# Patient Record
Sex: MALE | Race: BLACK OR AFRICAN AMERICAN | NOT HISPANIC OR LATINO | URBAN - METROPOLITAN AREA
[De-identification: names, ages, dates, MRNs, and addresses within clinical notes are randomized per-mention and may not be internally consistent; named-entity substitution may affect disease eponyms.]

---

## 2017-05-08 ENCOUNTER — EMERGENCY (EMERGENCY)
Facility: HOSPITAL | Age: 82
LOS: 1 days | Discharge: HOME | End: 2017-05-08
Attending: EMERGENCY MEDICINE | Admitting: EMERGENCY MEDICINE
Payer: MEDICARE

## 2017-05-08 VITALS
OXYGEN SATURATION: 100 % | TEMPERATURE: 98 F | SYSTOLIC BLOOD PRESSURE: 148 MMHG | DIASTOLIC BLOOD PRESSURE: 119 MMHG | RESPIRATION RATE: 18 BRPM | HEART RATE: 72 BPM

## 2017-05-08 VITALS
HEART RATE: 72 BPM | DIASTOLIC BLOOD PRESSURE: 72 MMHG | RESPIRATION RATE: 16 BRPM | SYSTOLIC BLOOD PRESSURE: 118 MMHG | OXYGEN SATURATION: 100 %

## 2017-05-08 LAB
APPEARANCE UR: SIGNIFICANT CHANGE UP
BACTERIA # UR AUTO: HIGH
BASE EXCESS BLDV CALC-SCNC: -2.1 MMOL/L — SIGNIFICANT CHANGE UP
BASOPHILS # BLD AUTO: 0.02 K/UL — SIGNIFICANT CHANGE UP (ref 0–0.2)
BASOPHILS NFR BLD AUTO: 0.3 % — SIGNIFICANT CHANGE UP (ref 0–2)
BILIRUB UR-MCNC: NEGATIVE — SIGNIFICANT CHANGE UP
BLOOD GAS VENOUS - CREATININE: 2.02 MG/DL — HIGH (ref 0.5–1.3)
BLOOD UR QL VISUAL: HIGH
BUN SERPL-MCNC: 82 MG/DL — HIGH (ref 7–23)
CALCIUM SERPL-MCNC: 9.5 MG/DL — SIGNIFICANT CHANGE UP (ref 8.4–10.5)
CHLORIDE BLDV-SCNC: 116 MMOL/L — HIGH (ref 96–108)
CHLORIDE SERPL-SCNC: 114 MMOL/L — HIGH (ref 98–107)
CO2 SERPL-SCNC: 19 MMOL/L — LOW (ref 22–31)
COLOR SPEC: YELLOW — SIGNIFICANT CHANGE UP
CREAT SERPL-MCNC: 2.08 MG/DL — HIGH (ref 0.5–1.3)
EOSINOPHIL # BLD AUTO: 0.15 K/UL — SIGNIFICANT CHANGE UP (ref 0–0.5)
EOSINOPHIL NFR BLD AUTO: 2.6 % — SIGNIFICANT CHANGE UP (ref 0–6)
GAS PNL BLDV: 147 MMOL/L — HIGH (ref 136–146)
GLUCOSE BLDV-MCNC: 92 — SIGNIFICANT CHANGE UP (ref 70–99)
GLUCOSE SERPL-MCNC: 91 MG/DL — SIGNIFICANT CHANGE UP (ref 70–99)
GLUCOSE UR-MCNC: NEGATIVE — SIGNIFICANT CHANGE UP
HCO3 BLDV-SCNC: 23 MMOL/L — SIGNIFICANT CHANGE UP (ref 20–27)
HCT VFR BLD CALC: 35.9 % — LOW (ref 39–50)
HCT VFR BLDV CALC: 35 % — LOW (ref 39–51)
HGB BLD-MCNC: 11.4 G/DL — LOW (ref 13–17)
HGB BLDV-MCNC: 11.4 G/DL — LOW (ref 13–17)
IMM GRANULOCYTES NFR BLD AUTO: 0 % — SIGNIFICANT CHANGE UP (ref 0–1.5)
KETONES UR-MCNC: NEGATIVE — SIGNIFICANT CHANGE UP
LACTATE BLDV-MCNC: 1 MMOL/L — SIGNIFICANT CHANGE UP (ref 0.5–2)
LEUKOCYTE ESTERASE UR-ACNC: HIGH
LYMPHOCYTES # BLD AUTO: 1.69 K/UL — SIGNIFICANT CHANGE UP (ref 1–3.3)
LYMPHOCYTES # BLD AUTO: 29.3 % — SIGNIFICANT CHANGE UP (ref 13–44)
MCHC RBC-ENTMCNC: 31.5 PG — SIGNIFICANT CHANGE UP (ref 27–34)
MCHC RBC-ENTMCNC: 31.8 % — LOW (ref 32–36)
MCV RBC AUTO: 99.2 FL — SIGNIFICANT CHANGE UP (ref 80–100)
MONOCYTES # BLD AUTO: 0.81 K/UL — SIGNIFICANT CHANGE UP (ref 0–0.9)
MONOCYTES NFR BLD AUTO: 14.1 % — HIGH (ref 2–14)
NEUTROPHILS # BLD AUTO: 3.09 K/UL — SIGNIFICANT CHANGE UP (ref 1.8–7.4)
NEUTROPHILS NFR BLD AUTO: 53.7 % — SIGNIFICANT CHANGE UP (ref 43–77)
NITRITE UR-MCNC: NEGATIVE — SIGNIFICANT CHANGE UP
PCO2 BLDV: 36 MMHG — LOW (ref 41–51)
PH BLDV: 7.4 PH — SIGNIFICANT CHANGE UP (ref 7.32–7.43)
PH UR: 8 — SIGNIFICANT CHANGE UP (ref 4.6–8)
PLATELET # BLD AUTO: 184 K/UL — SIGNIFICANT CHANGE UP (ref 150–400)
PMV BLD: 10.3 FL — SIGNIFICANT CHANGE UP (ref 7–13)
PO2 BLDV: 45 MMHG — HIGH (ref 35–40)
POTASSIUM BLDV-SCNC: 4.5 MMOL/L — SIGNIFICANT CHANGE UP (ref 3.4–4.5)
POTASSIUM SERPL-MCNC: 4.8 MMOL/L — SIGNIFICANT CHANGE UP (ref 3.5–5.3)
POTASSIUM SERPL-SCNC: 4.8 MMOL/L — SIGNIFICANT CHANGE UP (ref 3.5–5.3)
PROT UR-MCNC: 150 — HIGH
RBC # BLD: 3.62 M/UL — LOW (ref 4.2–5.8)
RBC # FLD: 13.5 % — SIGNIFICANT CHANGE UP (ref 10.3–14.5)
RBC CASTS # UR COMP ASSIST: >50 — HIGH (ref 0–?)
SAO2 % BLDV: 79.3 % — SIGNIFICANT CHANGE UP (ref 60–85)
SODIUM SERPL-SCNC: 147 MMOL/L — HIGH (ref 135–145)
SP GR SPEC: 1.02 — SIGNIFICANT CHANGE UP (ref 1–1.03)
UROBILINOGEN FLD QL: NORMAL E.U. — SIGNIFICANT CHANGE UP (ref 0.1–0.2)
WBC # BLD: 5.76 K/UL — SIGNIFICANT CHANGE UP (ref 3.8–10.5)
WBC # FLD AUTO: 5.76 K/UL — SIGNIFICANT CHANGE UP (ref 3.8–10.5)
WBC CLUMPS #/AREA URNS HPF: PRESENT — HIGH (ref 0–?)
WBC UR QL: >50 — HIGH (ref 0–?)

## 2017-05-08 PROCEDURE — 99284 EMERGENCY DEPT VISIT MOD MDM: CPT | Mod: 25

## 2017-05-08 PROCEDURE — 71010: CPT | Mod: 26

## 2017-05-08 RX ORDER — SODIUM CHLORIDE 9 MG/ML
1000 INJECTION INTRAMUSCULAR; INTRAVENOUS; SUBCUTANEOUS ONCE
Qty: 0 | Refills: 0 | Status: COMPLETED | OUTPATIENT
Start: 2017-05-08 | End: 2017-05-08

## 2017-05-08 RX ORDER — CEFUROXIME AXETIL 250 MG
1 TABLET ORAL
Qty: 20 | Refills: 0 | OUTPATIENT
Start: 2017-05-08 | End: 2017-05-18

## 2017-05-08 RX ORDER — ACETAMINOPHEN 500 MG
650 TABLET ORAL ONCE
Qty: 0 | Refills: 0 | Status: COMPLETED | OUTPATIENT
Start: 2017-05-08 | End: 2017-05-08

## 2017-05-08 RX ORDER — SODIUM CHLORIDE 9 MG/ML
3 INJECTION INTRAMUSCULAR; INTRAVENOUS; SUBCUTANEOUS ONCE
Qty: 0 | Refills: 0 | Status: COMPLETED | OUTPATIENT
Start: 2017-05-08 | End: 2017-05-08

## 2017-05-08 RX ORDER — CEFTRIAXONE 500 MG/1
1 INJECTION, POWDER, FOR SOLUTION INTRAMUSCULAR; INTRAVENOUS ONCE
Qty: 0 | Refills: 0 | Status: COMPLETED | OUTPATIENT
Start: 2017-05-08 | End: 2017-05-08

## 2017-05-08 RX ADMIN — Medication 650 MILLIGRAM(S): at 03:53

## 2017-05-08 RX ADMIN — CEFTRIAXONE 100 GRAM(S): 500 INJECTION, POWDER, FOR SOLUTION INTRAMUSCULAR; INTRAVENOUS at 03:53

## 2017-05-08 RX ADMIN — SODIUM CHLORIDE 3 MILLILITER(S): 9 INJECTION INTRAMUSCULAR; INTRAVENOUS; SUBCUTANEOUS at 02:10

## 2017-05-08 RX ADMIN — Medication 125 MILLIGRAM(S): at 02:10

## 2017-05-08 RX ADMIN — SODIUM CHLORIDE 1000 MILLILITER(S): 9 INJECTION INTRAMUSCULAR; INTRAVENOUS; SUBCUTANEOUS at 03:58

## 2017-05-08 NOTE — ED PROVIDER NOTE - MEDICAL DECISION MAKING DETAILS
pt with change in MS as well as cough.  Will look for infectious source.  Discussed options with regards to patient disposition and will attempt home care as patient does not do well in hospitals.  elevated BUN/Cr ratio so hydrated in ED.

## 2017-05-08 NOTE — ED ADULT TRIAGE NOTE - CHIEF COMPLAINT QUOTE
Pt arrives from home with aide, reporting cough and wheezing x2 days. Per aide pt has been given albuterol tx's Q3h with no relief. Pt appears in no acute distress, vs as noted. Hx asthma, dementia and HTN

## 2017-05-08 NOTE — ED ADULT NURSE NOTE - OBJECTIVE STATEMENT
pt presents to room 4 awake, alert, disoriented with hx of alzheimers, caretakers at bedside. as per caretakers, pt had a cough since yesterday and became "weak and lethargic" today. caretakers deny fever, sweats, vomitting or diarrhea. no sick contacts, no flu shot this year. nad noted. md assessed. labs drawn and sent. will continue to monitor

## 2017-05-09 LAB — SPECIMEN SOURCE: SIGNIFICANT CHANGE UP

## 2017-05-10 LAB
-  AMIKACIN: SIGNIFICANT CHANGE UP
-  AMPICILLIN/SULBACTAM: SIGNIFICANT CHANGE UP
-  AMPICILLIN: SIGNIFICANT CHANGE UP
-  AZTREONAM: SIGNIFICANT CHANGE UP
-  CEFAZOLIN: SIGNIFICANT CHANGE UP
-  CEFEPIME: SIGNIFICANT CHANGE UP
-  CEFOXITIN: SIGNIFICANT CHANGE UP
-  CEFTAZIDIME: SIGNIFICANT CHANGE UP
-  CEFTRIAXONE: SIGNIFICANT CHANGE UP
-  CIPROFLOXACIN: SIGNIFICANT CHANGE UP
-  ERTAPENEM: SIGNIFICANT CHANGE UP
-  GENTAMICIN: SIGNIFICANT CHANGE UP
-  LEVOFLOXACIN: SIGNIFICANT CHANGE UP
-  MEROPENEM: SIGNIFICANT CHANGE UP
-  NITROFURANTOIN: SIGNIFICANT CHANGE UP
-  PIPERACILLIN/TAZOBACTAM: SIGNIFICANT CHANGE UP
-  TOBRAMYCIN: SIGNIFICANT CHANGE UP
-  TRIMETHOPRIM/SULFAMETHOXAZOLE: SIGNIFICANT CHANGE UP
BACTERIA UR CULT: SIGNIFICANT CHANGE UP
METHOD TYPE: SIGNIFICANT CHANGE UP
ORGANISM # SPEC MICROSCOPIC CNT: SIGNIFICANT CHANGE UP
ORGANISM # SPEC MICROSCOPIC CNT: SIGNIFICANT CHANGE UP

## 2017-05-10 NOTE — ED POST DISCHARGE NOTE - RESULT SUMMARY
UCX: proteus species > 100,000 prelim. Patient discharged home with a prescription for  Ceftin 250mg BID X 10 days. Admin P.A. to follow results to final.

## 2018-04-04 ENCOUNTER — INPATIENT (INPATIENT)
Facility: HOSPITAL | Age: 83
LOS: 9 days | Discharge: HOME HEALTH | End: 2018-04-14
Attending: INTERNAL MEDICINE | Admitting: INTERNAL MEDICINE
Payer: MEDICARE

## 2018-04-04 VITALS
DIASTOLIC BLOOD PRESSURE: 88 MMHG | TEMPERATURE: 98 F | HEART RATE: 81 BPM | RESPIRATION RATE: 18 BRPM | OXYGEN SATURATION: 99 % | SYSTOLIC BLOOD PRESSURE: 154 MMHG

## 2018-04-04 DIAGNOSIS — Z95.828 PRESENCE OF OTHER VASCULAR IMPLANTS AND GRAFTS: Chronic | ICD-10-CM

## 2018-04-04 DIAGNOSIS — K92.2 GASTROINTESTINAL HEMORRHAGE, UNSPECIFIED: ICD-10-CM

## 2018-04-04 DIAGNOSIS — F03.90 UNSPECIFIED DEMENTIA WITHOUT BEHAVIORAL DISTURBANCE: ICD-10-CM

## 2018-04-04 DIAGNOSIS — N18.3 CHRONIC KIDNEY DISEASE, STAGE 3 (MODERATE): ICD-10-CM

## 2018-04-04 DIAGNOSIS — I50.9 HEART FAILURE, UNSPECIFIED: ICD-10-CM

## 2018-04-04 DIAGNOSIS — Z29.9 ENCOUNTER FOR PROPHYLACTIC MEASURES, UNSPECIFIED: ICD-10-CM

## 2018-04-04 DIAGNOSIS — E03.9 HYPOTHYROIDISM, UNSPECIFIED: ICD-10-CM

## 2018-04-04 DIAGNOSIS — Z95.0 PRESENCE OF CARDIAC PACEMAKER: Chronic | ICD-10-CM

## 2018-04-04 DIAGNOSIS — Z96.642 PRESENCE OF LEFT ARTIFICIAL HIP JOINT: Chronic | ICD-10-CM

## 2018-04-04 DIAGNOSIS — I48.2 CHRONIC ATRIAL FIBRILLATION: ICD-10-CM

## 2018-04-04 DIAGNOSIS — N40.0 BENIGN PROSTATIC HYPERPLASIA WITHOUT LOWER URINARY TRACT SYMPTOMS: ICD-10-CM

## 2018-04-04 DIAGNOSIS — E86.0 DEHYDRATION: ICD-10-CM

## 2018-04-04 LAB
ALBUMIN SERPL ELPH-MCNC: 4 G/DL — SIGNIFICANT CHANGE UP (ref 3.3–5)
ALP SERPL-CCNC: 69 U/L — SIGNIFICANT CHANGE UP (ref 40–120)
ALT FLD-CCNC: 11 U/L — SIGNIFICANT CHANGE UP (ref 4–41)
AMMONIA BLD-MCNC: 17 UMOL/L — SIGNIFICANT CHANGE UP (ref 11–55)
APPEARANCE UR: CLEAR — SIGNIFICANT CHANGE UP
APTT BLD: 30.3 SEC — SIGNIFICANT CHANGE UP (ref 27.5–37.4)
AST SERPL-CCNC: 18 U/L — SIGNIFICANT CHANGE UP (ref 4–40)
BACTERIA # UR AUTO: SIGNIFICANT CHANGE UP
BASE EXCESS BLDV CALC-SCNC: 2.5 MMOL/L — SIGNIFICANT CHANGE UP
BASOPHILS # BLD AUTO: 0.02 K/UL — SIGNIFICANT CHANGE UP (ref 0–0.2)
BASOPHILS NFR BLD AUTO: 0.3 % — SIGNIFICANT CHANGE UP (ref 0–2)
BILIRUB SERPL-MCNC: 1 MG/DL — SIGNIFICANT CHANGE UP (ref 0.2–1.2)
BILIRUB UR-MCNC: NEGATIVE — SIGNIFICANT CHANGE UP
BLD GP AB SCN SERPL QL: NEGATIVE — SIGNIFICANT CHANGE UP
BLOOD GAS VENOUS - CREATININE: 1.8 MG/DL — HIGH (ref 0.5–1.3)
BLOOD UR QL VISUAL: HIGH
BUN SERPL-MCNC: 54 MG/DL — HIGH (ref 7–23)
CALCIUM SERPL-MCNC: 9.6 MG/DL — SIGNIFICANT CHANGE UP (ref 8.4–10.5)
CHLORIDE BLDV-SCNC: 107 MMOL/L — SIGNIFICANT CHANGE UP (ref 96–108)
CHLORIDE SERPL-SCNC: 103 MMOL/L — SIGNIFICANT CHANGE UP (ref 98–107)
CO2 SERPL-SCNC: 25 MMOL/L — SIGNIFICANT CHANGE UP (ref 22–31)
COLOR SPEC: YELLOW — SIGNIFICANT CHANGE UP
CREAT SERPL-MCNC: 2.05 MG/DL — HIGH (ref 0.5–1.3)
EOSINOPHIL # BLD AUTO: 0.01 K/UL — SIGNIFICANT CHANGE UP (ref 0–0.5)
EOSINOPHIL NFR BLD AUTO: 0.1 % — SIGNIFICANT CHANGE UP (ref 0–6)
GAS PNL BLDV: 143 MMOL/L — SIGNIFICANT CHANGE UP (ref 136–146)
GLUCOSE BLDV-MCNC: 140 — HIGH (ref 70–99)
GLUCOSE SERPL-MCNC: 144 MG/DL — HIGH (ref 70–99)
GLUCOSE UR-MCNC: NEGATIVE — SIGNIFICANT CHANGE UP
HCO3 BLDV-SCNC: 25 MMOL/L — SIGNIFICANT CHANGE UP (ref 20–27)
HCT VFR BLD CALC: 40.8 % — SIGNIFICANT CHANGE UP (ref 39–50)
HCT VFR BLDV CALC: 44 % — SIGNIFICANT CHANGE UP (ref 39–51)
HGB BLD-MCNC: 13.3 G/DL — SIGNIFICANT CHANGE UP (ref 13–17)
HGB BLDV-MCNC: 14.3 G/DL — SIGNIFICANT CHANGE UP (ref 13–17)
IMM GRANULOCYTES # BLD AUTO: 0.01 # — SIGNIFICANT CHANGE UP
IMM GRANULOCYTES NFR BLD AUTO: 0.1 % — SIGNIFICANT CHANGE UP (ref 0–1.5)
INR BLD: 1 — SIGNIFICANT CHANGE UP (ref 0.88–1.17)
KETONES UR-MCNC: NEGATIVE — SIGNIFICANT CHANGE UP
LACTATE BLDV-MCNC: 2.4 MMOL/L — HIGH (ref 0.5–2)
LEUKOCYTE ESTERASE UR-ACNC: NEGATIVE — SIGNIFICANT CHANGE UP
LIDOCAIN IGE QN: 15.7 U/L — SIGNIFICANT CHANGE UP (ref 7–60)
LYMPHOCYTES # BLD AUTO: 0.88 K/UL — LOW (ref 1–3.3)
LYMPHOCYTES # BLD AUTO: 11.4 % — LOW (ref 13–44)
MCHC RBC-ENTMCNC: 31.4 PG — SIGNIFICANT CHANGE UP (ref 27–34)
MCHC RBC-ENTMCNC: 32.6 % — SIGNIFICANT CHANGE UP (ref 32–36)
MCV RBC AUTO: 96.2 FL — SIGNIFICANT CHANGE UP (ref 80–100)
MONOCYTES # BLD AUTO: 0.78 K/UL — SIGNIFICANT CHANGE UP (ref 0–0.9)
MONOCYTES NFR BLD AUTO: 10.1 % — SIGNIFICANT CHANGE UP (ref 2–14)
MUCOUS THREADS # UR AUTO: SIGNIFICANT CHANGE UP
NEUTROPHILS # BLD AUTO: 6.02 K/UL — SIGNIFICANT CHANGE UP (ref 1.8–7.4)
NEUTROPHILS NFR BLD AUTO: 78 % — HIGH (ref 43–77)
NITRITE UR-MCNC: NEGATIVE — SIGNIFICANT CHANGE UP
NRBC # FLD: 0 — SIGNIFICANT CHANGE UP
OB PNL STL: POSITIVE — SIGNIFICANT CHANGE UP
PCO2 BLDV: 50 MMHG — SIGNIFICANT CHANGE UP (ref 41–51)
PH BLDV: 7.36 PH — SIGNIFICANT CHANGE UP (ref 7.32–7.43)
PH UR: 5.5 — SIGNIFICANT CHANGE UP (ref 4.6–8)
PLATELET # BLD AUTO: 239 K/UL — SIGNIFICANT CHANGE UP (ref 150–400)
PMV BLD: 9.8 FL — SIGNIFICANT CHANGE UP (ref 7–13)
PO2 BLDV: 27 MMHG — LOW (ref 35–40)
POTASSIUM BLDV-SCNC: 4.9 MMOL/L — HIGH (ref 3.4–4.5)
POTASSIUM SERPL-MCNC: 5.3 MMOL/L — SIGNIFICANT CHANGE UP (ref 3.5–5.3)
POTASSIUM SERPL-SCNC: 5.3 MMOL/L — SIGNIFICANT CHANGE UP (ref 3.5–5.3)
PROT SERPL-MCNC: 8.1 G/DL — SIGNIFICANT CHANGE UP (ref 6–8.3)
PROT UR-MCNC: 100 MG/DL — SIGNIFICANT CHANGE UP
PROTHROM AB SERPL-ACNC: 11.5 SEC — SIGNIFICANT CHANGE UP (ref 9.8–13.1)
RBC # BLD: 4.24 M/UL — SIGNIFICANT CHANGE UP (ref 4.2–5.8)
RBC # FLD: 13.9 % — SIGNIFICANT CHANGE UP (ref 10.3–14.5)
RBC CASTS # UR COMP ASSIST: SIGNIFICANT CHANGE UP (ref 0–?)
RH IG SCN BLD-IMP: POSITIVE — SIGNIFICANT CHANGE UP
SAO2 % BLDV: 41.3 % — LOW (ref 60–85)
SODIUM SERPL-SCNC: 143 MMOL/L — SIGNIFICANT CHANGE UP (ref 135–145)
SP GR SPEC: 1.03 — SIGNIFICANT CHANGE UP (ref 1–1.04)
UROBILINOGEN FLD QL: NORMAL MG/DL — SIGNIFICANT CHANGE UP
WBC # BLD: 7.72 K/UL — SIGNIFICANT CHANGE UP (ref 3.8–10.5)
WBC # FLD AUTO: 7.72 K/UL — SIGNIFICANT CHANGE UP (ref 3.8–10.5)
WBC UR QL: HIGH (ref 0–?)

## 2018-04-04 PROCEDURE — 74176 CT ABD & PELVIS W/O CONTRAST: CPT | Mod: 26

## 2018-04-04 PROCEDURE — 99223 1ST HOSP IP/OBS HIGH 75: CPT

## 2018-04-04 PROCEDURE — 71045 X-RAY EXAM CHEST 1 VIEW: CPT | Mod: 26

## 2018-04-04 RX ORDER — LEVOTHYROXINE SODIUM 125 MCG
100 TABLET ORAL DAILY
Qty: 0 | Refills: 0 | Status: DISCONTINUED | OUTPATIENT
Start: 2018-04-04 | End: 2018-04-12

## 2018-04-04 RX ORDER — PANTOPRAZOLE SODIUM 20 MG/1
80 TABLET, DELAYED RELEASE ORAL ONCE
Qty: 0 | Refills: 0 | Status: COMPLETED | OUTPATIENT
Start: 2018-04-04 | End: 2018-04-04

## 2018-04-04 RX ORDER — SOD,AMMONIUM,POTASSIUM LACTATE
1 CREAM (GRAM) TOPICAL
Qty: 0 | Refills: 0 | Status: DISCONTINUED | OUTPATIENT
Start: 2018-04-04 | End: 2018-04-14

## 2018-04-04 RX ORDER — FINASTERIDE 5 MG/1
5 TABLET, FILM COATED ORAL DAILY
Qty: 0 | Refills: 0 | Status: DISCONTINUED | OUTPATIENT
Start: 2018-04-04 | End: 2018-04-14

## 2018-04-04 RX ORDER — SODIUM CHLORIDE 9 MG/ML
1000 INJECTION, SOLUTION INTRAVENOUS
Qty: 0 | Refills: 0 | Status: DISCONTINUED | OUTPATIENT
Start: 2018-04-04 | End: 2018-04-05

## 2018-04-04 RX ORDER — VERAPAMIL HCL 240 MG
120 CAPSULE, EXTENDED RELEASE PELLETS 24 HR ORAL DAILY
Qty: 0 | Refills: 0 | Status: DISCONTINUED | OUTPATIENT
Start: 2018-04-04 | End: 2018-04-14

## 2018-04-04 RX ORDER — PANTOPRAZOLE SODIUM 20 MG/1
8 TABLET, DELAYED RELEASE ORAL
Qty: 80 | Refills: 0 | Status: DISCONTINUED | OUTPATIENT
Start: 2018-04-04 | End: 2018-04-12

## 2018-04-04 RX ADMIN — SODIUM CHLORIDE 75 MILLILITER(S): 9 INJECTION, SOLUTION INTRAVENOUS at 21:53

## 2018-04-04 RX ADMIN — PANTOPRAZOLE SODIUM 10 MG/HR: 20 TABLET, DELAYED RELEASE ORAL at 22:18

## 2018-04-04 RX ADMIN — PANTOPRAZOLE SODIUM 80 MILLIGRAM(S): 20 TABLET, DELAYED RELEASE ORAL at 20:04

## 2018-04-04 NOTE — H&P ADULT - REASON FOR ADMISSION
GI  BLEED, dehydration, Coffee Ground Emesis, GI  BLEED, dehydration, Coffee Ground Emesis, RICARDO,

## 2018-04-04 NOTE — H&P ADULT - NSHPOUTPATIENTPROVIDERS_GEN_ALL_CORE
PCP: Slim Lovelace [phone]  HCP: Freddie Mares- [phone]  Charlette NAZARIO with ICS: [phone]  GI: Dr. Robni- [phone]    PCP: Slim Lovelace [phone]  HCP: Freddie Mares- [phone]  Charlette NAZARIO with ICS: [phone]  GI: Dr. Robin- [phone]

## 2018-04-04 NOTE — H&P ADULT - PMH
Congestive heart failure    Dementia Atrial fibrillation, chronic    BPH (benign prostatic hyperplasia)    CKD (chronic kidney disease) stage 3, GFR 30-59 ml/min    Congestive heart failure    Dementia    GI bleed    Hypothyroid Atrial fibrillation, chronic    BPH (benign prostatic hyperplasia)    CKD (chronic kidney disease) stage 3, GFR 30-59 ml/min    Congestive heart failure    Dementia    GI bleed    Hypothyroid    Pacemaker    S/P IVC filter

## 2018-04-04 NOTE — H&P ADULT - NSHPSOURCEINFORD_GEN_ALL_CORE
Patient/Chart(s) Chart(s)/Home Health Aide or Other Non-Family Caregiver Friend/Colleague/Home Health Aide or Other Non-Family Caregiver/Chart(s)

## 2018-04-04 NOTE — H&P ADULT - PROBLEM SELECTOR PLAN 8
+ RICARDO on CKD, Upper GI Bleed, NPO, IVF LR @ 75 cc/hr x 12 hours, Hold Lasix for now, HX of CHF, EF unknown, F/U BMP, CBC

## 2018-04-04 NOTE — CONSULT NOTE ADULT - SUBJECTIVE AND OBJECTIVE BOX
GENERAL SURGERY CONSULT    Consulting surgical team: B (pager [phone])  Consulting attending: [place]on  Patient seen and examined: 04-04-18 @ 18:42    HPI:  Patient is a 89y Male      PAST MEDICAL HISTORY:  Congestive heart failure  Dementia  No pertinent past medical history      PAST SURGICAL HISTORY:      ALLERGIES:  penicillin (Hives)  penicillins (Unknown)      MEDICATIONS  (STANDING):    MEDICATIONS  (PRN):      VITALS & I/Os:  Vital Signs Last 24 Hrs  T(C): 37.7 (04 Apr 2018 15:38), Max: 37.7 (04 Apr 2018 15:38)  T(F): 99.9 (04 Apr 2018 15:38), Max: 99.9 (04 Apr 2018 15:38)  HR: 70 (04 Apr 2018 17:06) (70 - 81)  BP: 130/66 (04 Apr 2018 17:06) (130/66 - 154/88)  BP(mean): --  RR: 18 (04 Apr 2018 17:06) (18 - 18)  SpO2: 98% (04 Apr 2018 17:06) (98% - 99%)  CAPILLARY BLOOD GLUCOSE      POCT Blood Glucose.: 142 mg/dL (04 Apr 2018 14:30)      I&O's Summary        GEN: NAD, alert and oriented x 3  HEENT: WNL  CHEST: Symmetrical chest rise, breath sounds CTAB  HEART: RRR, non-muffled heart sounds  ABD: Soft, non-tender, non-distended  EXT/VASC:         LABS:                        13.3   7.72  )-----------( 239      ( 04 Apr 2018 14:45 )             40.8     04-04    143  |  103  |  54<H>  ----------------------------<  144<H>  5.3   |  25  |  2.05<H>    Ca    9.6      04 Apr 2018 14:45    TPro  8.1  /  Alb  4.0  /  TBili  1.0  /  DBili  x   /  AST  18  /  ALT  11  /  AlkPhos  69  04-04    Lactate:    PT/INR - ( 04 Apr 2018 14:45 )   PT: 11.5 SEC;   INR: 1.00          PTT - ( 04 Apr 2018 14:45 )  PTT:30.3 SEC        04-04 @ 14:40  2.4        IMAGING:  < from: CT Abdomen and Pelvis No Cont (04.04.18 @ 17:32) >  IMPRESSION:   Mildly dilated loops of small bowel in the left lower quadrant without a   discrete transition point concerning for a low-grade bowel obstruction.   Small amount of intra-abdominal ascites.    Nonobstructive stone in the right renal pelvis. No evidence of   hydroureter or hydronephrosis.  Cholelithiasis.    < end of copied text >        ASSESSMENT & PLAN  89y male      Discussed with GENERAL SURGERY CONSULT    Consulting surgical team: KAYLEIGH (pager [phone])  Consulting attending: Preston  Patient seen and examined: 04-04-18 @ 18:42    HPI:  Patient is a 89y Male - h/o atrial fibrillation on Xarelto, and prior UGIB (1990s, treated with coil embolization - as described by patient's healthcare proxy) - brought in by ambulance after large episode of coffee-ground emesis. Per patient's aides (who live with the patient and attend to him daily), he had an episode of NBNB emesis the morning prior and then tolerated his evening meal. Then, this AM, he had this episode of "black vomit." In addition, his aides have noted that over the past day or so his mental status has been depressed. At baseline, the patient is demented (disoriented) but typically alert and interactive. He ambulates short distances within the home with assistance. However, for the past day he has been more lethargic and less interactive. He has not complained of any pain. No fever or chills noted by his aides. He is incontinent of urine and stool, and his aides report that he has been regularly urinating and passing soft, formed, brown stool (last BM yesterday).     Of note, all of the patient's healthcare is typically managed at OSH ([facility]).   PCP: Slim Lovelace - [phone]      PAST MEDICAL HISTORY:  Congestive heart failure  Dementia  No pertinent past medical history      PAST SURGICAL HISTORY:      ALLERGIES:  penicillin (Hives)  penicillins (Unknown)      MEDICATIONS  (STANDING):    MEDICATIONS  (PRN):      VITALS & I/Os:  Vital Signs Last 24 Hrs  T(C): 37.7 (04 Apr 2018 15:38), Max: 37.7 (04 Apr 2018 15:38)  T(F): 99.9 (04 Apr 2018 15:38), Max: 99.9 (04 Apr 2018 15:38)  HR: 70 (04 Apr 2018 17:06) (70 - 81)  BP: 130/66 (04 Apr 2018 17:06) (130/66 - 154/88)  BP(mean): --  RR: 18 (04 Apr 2018 17:06) (18 - 18)  SpO2: 98% (04 Apr 2018 17:06) (98% - 99%)  CAPILLARY BLOOD GLUCOSE      POCT Blood Glucose.: 142 mg/dL (04 Apr 2018 14:30)          GEN: Lethargic, but arousable. Performs purposeful movements, but will not obey commands.   HEENT: Dry mucus membranes. Otherwise, WNL.  CHEST: Symmetrical chest rise, no increased work of breathing or stridor.  HEART: Regular (paced)  ABD: Distended, tympanitic, soft, non-tender.  RECTAL: Small, soft internal hemorrhoid. Soft stool in rectal vault. No masses. Brown stool on glove.  EXT/VASC: No edema/erythema or deformity. Warm, cap refill < 2 sec. Motor and sensory function grossly intact.       LABS:                        13.3   7.72  )-----------( 239      ( 04 Apr 2018 14:45 )             40.8     04-04    143  |  103  |  54<H>  ----------------------------<  144<H>  5.3   |  25  |  2.05<H>    Ca    9.6      04 Apr 2018 14:45    TPro  8.1  /  Alb  4.0  /  TBili  1.0  /  DBili  x   /  AST  18  /  ALT  11  /  AlkPhos  69  04-04    Lactate:    PT/INR - ( 04 Apr 2018 14:45 )   PT: 11.5 SEC;   INR: 1.00          PTT - ( 04 Apr 2018 14:45 )  PTT:30.3 SEC        04-04 @ 14:40  2.4        IMAGING:  < from: CT Abdomen and Pelvis No Cont (04.04.18 @ 17:32) >  IMPRESSION:   Mildly dilated loops of small bowel in the left lower quadrant without a   discrete transition point concerning for a low-grade bowel obstruction.   Small amount of intra-abdominal ascites.    Nonobstructive stone in the right renal pelvis. No evidence of   hydroureter or hydronephrosis.  Cholelithiasis.    < end of copied text >        ASSESSMENT & PLAN  89y male with likely recurrent UGIB, without hemodynamic instability at present, and depressed mental status. No bowel obstruction evident. Recommend:  1) Rehydration with crystalloid IVF  2) Urinalysis and culture (rule out occult UTI)  3) Check serum ammonia  4) GI consultation  5) Trend H/H, serial abdominal examinations  6) Head of bead to a least 30 degrees    No surgical intervention at present. Will follow.      Discussed with Attending (Preston)      Cosme Hahn  R3, General Surgery

## 2018-04-04 NOTE — H&P ADULT - NEUROLOGICAL
Detail Level: Detailed Post-Care Instructions: I reviewed with the patient in detail post-care instructions. Patient is to wear sunprotection, and avoid picking at any of the treated lesions. Pt may apply Vaseline to crusted or scabbing areas.\\nWHAT TO EXPECT AFTER CRYOSURGERY (LIQUID NITROGEN) TREATMENT\\n\\n\\n Liquid Nitrogen is a very cold gas. In this liquid state it has a temperature of 320 degrees below zero Fahrenheit. Dermatologists use liquid nitrogen to treat certain skin growths such as warts, seborrheic and actinic keratoses, and a whole variety of other skin tumors. These skin growths are destroyed by the freezing action of this agent. With cryosurgery we have the advantage of being able to treat many skin growths and leave very little scarring. \\n\\n From a few hours to a few days after treatment, the area may blister, turn black, or form a scab. This is a desirable result. In some, no reaction is apparent.\\n\\n 1. You are allowed to get the area wet even immediately after treatment.\\n\\n 2. Most person have little or no pain from this treatment. You may have some swelling about the eyes, if cyrotherapy is performed on the forehead or temple.\\n\\n 3. It is not necessary to cover the area with a bandage. In fact, this is undesirable. Things heal better if left open to the air. You should protect the area from injury as much as possible. \\n\\n 4. Painful large blisters (even blisters filled with blood) can occur at times. These can be opened and the fluid drained out to relieve the pain. This may be done with a needle which has been cleaned with alcohol. \\n\\n 5. As the treated area heals the unwanted skin growth will fall off. This will take several days to weeks depending on the size and nature of the growth treated, the location on the skn and the way your body heals. \\n\\n 6. Allow the growth to fall off by itself - don't pick it or pull it off.\\n\\n 7. When the growth does come off, the skin underneath will be somewhat red. As time passes it will assume the color of normal skin. Don't bandage, pick at or apply any medications to the site after the growth has fallen off. The area may be sensitive to touch and be itchy as it heals. This is normal and it may take some time before it is exactly like the skin around it once more. \\n\\n\\n If you have any questions or concerns, please contact our office:         [facility] [phone] Render Post-Care Instructions In Note?: yes Duration Of Freeze Thaw-Cycle (Seconds): 0 Consent: The patient's consent was obtained including but not limited to risks of crusting, scabbing, blistering, scarring, darker or lighter pigmentary change, recurrence, incomplete removal and infection. detailed exam

## 2018-04-04 NOTE — ED ADULT NURSE NOTE - OBJECTIVE STATEMENT
patient presents to ED with a onset of vomiting brown emesis (coffee ground?) today as per aides who care for the patient. unknown if pt aspirated however LS are rhonchi skin is pale and pt overall appears unwell. as per aides pt walks with a walker however is lethargic and weak today. VSS paced rhythm. brown emesis noted around lips. abd soft but rounded and distended. Iv 20 g RAC labs sent will CTM. pt in semi fowlers to protect airway. as per adies pt has had lower and upper GI bleeds in the past.

## 2018-04-04 NOTE — ED PROVIDER NOTE - OBJECTIVE STATEMENT
89 yo man w/ h/o chf s/p ppm, dementia here w/ vomiting. History from care giver who states pt had single episode of vomiting today, described as black. Has otherwise been well, acting normally, no complaints of abd pain. Normal stools, no diarrhea, no melena nor red blood. Denies fever. 89 yo man w/ h/o chf s/p ppm, dementia here w/ vomiting. History from care giver who states pt had single episode of vomiting today, described as black. Has otherwise been well, acting normally, no complaints of abd pain. Normal stools, no diarrhea, no melena nor red blood. Denies fever. Pt is DNR/DNI per aid.  HCP: Freddie Mares  PMD: Slim Lovelace

## 2018-04-04 NOTE — H&P ADULT - ASSESSMENT
Patient is a 89y Male DNR/DNI , +PPM, S/P IVC Filter - h/o atrial fibrillation on Xarelto, and prior UGIB (1990s, treated with coil embolization - as described by patient's healthcare proxy), BPH, CKD stage 3, Hypothyroid, Dementia - brought in by ambulance after large episode of coffee-ground emesis. Per patient's aides (who lives with the patient and attend to him daily), he had an episode of NBNB emesis the morning prior and then tolerated his evening meal. Then, this AM, he had this episode of "black vomit." In addition, his aides have noted that over the past day or so his mental status has been depressed. At baseline, the patient is demented (disoriented) but typically alert and interactive. He ambulates short distances within the home with assistance. However, for the past day he has been more lethargic and less interactive. He has not complained of any pain. No fever or chills noted by his aides. He is incontinent of urine and stool, and his aides report that he has been regularly urinating and passing soft, formed, brown stool (last BM yesterday). Pt uses walker to ambulate, no HX of Dysphagia, no fever, + RICARDO and signs of dehydration as per Labs noted, I started pt on Protonix Drip, NPO, IVF LR for now x 12 hours pt is on PO Lasix at home , pt is Calm no acute distress, accompanied with his HHA and a friend, S/P CT A/P no contrast tonight: small Ascites, nonobstructive stone in Rt renal pelvis, mildly dilated loops of small  bowel, in LLQ, enlarged prostate, Atrophic B/L kidneys, Occult Blood +, Pt was seen by Surgery tonight NO intervention as per surgery Note,     Of note, all of the patient's healthcare is typically managed at OSH ([facility]).   PCP: Slim Lovelace - [phone]  HCP: Freddie Mares- [phone]  Charlette NAZARIO with ICS: [phone]  GI: Dr. Robin- [phone]

## 2018-04-04 NOTE — ED ADULT NURSE REASSESSMENT NOTE - NS ED NURSE REASSESS COMMENT FT1
straight cath via sterile procedure with ALON PCA as chaperone. of note, coffee ground like specs of possible fecal matter seen in straight cath bag when drained. Notified Attending Apolinar

## 2018-04-04 NOTE — H&P ADULT - PROBLEM SELECTOR PLAN 3
F/U BMP, + RICARDO as well , Dehydration, Hold Lasix for now, IVF LR @ 75 cc/hr x 12 hours, F/U BMP, CBC,

## 2018-04-04 NOTE — H&P ADULT - HISTORY OF PRESENT ILLNESS
Patient is a 89y Male - h/o atrial fibrillation on Xarelto, and prior UGIB (1990s, treated with coil embolization - as described by patient's healthcare proxy), BPH, CKD stage 3, Hypothyroid, Dementia - brought in by ambulance after large episode of coffee-ground emesis. Per patient's aides (who lives with the patient and attend to him daily), he had an episode of NBNB emesis the morning prior and then tolerated his evening meal. Then, this AM, he had this episode of "black vomit." In addition, his aides have noted that over the past day or so his mental status has been depressed. At baseline, the patient is demented (disoriented) but typically alert and interactive. He ambulates short distances within the home with assistance. However, for the past day he has been more lethargic and less interactive. He has not complained of any pain. No fever or chills noted by his aides. He is incontinent of urine and stool, and his aides report that he has been regularly urinating and passing soft, formed, brown stool (last BM yesterday).   Of note, all of the patient's healthcare is typically managed at OSH ([facility]).   PCP: Slim Lovelace - [phone] Patient is a 89y Male DNR/DNI , +PPM, S/P IVC Filter - h/o atrial fibrillation on Xarelto, and prior UGIB (1990s, treated with coil embolization - as described by patient's healthcare proxy), BPH, CKD stage 3, Hypothyroid, Dementia - brought in by ambulance after large episode of coffee-ground emesis. Per patient's aides (who lives with the patient and attend to him daily), he had an episode of NBNB emesis the morning prior and then tolerated his evening meal. Then, this AM, he had this episode of "black vomit." In addition, his aides have noted that over the past day or so his mental status has been depressed. At baseline, the patient is demented (disoriented) but typically alert and interactive. He ambulates short distances within the home with assistance. However, for the past day he has been more lethargic and less interactive. He has not complained of any pain. No fever or chills noted by his aides. He is incontinent of urine and stool, and his aides report that he has been regularly urinating and passing soft, formed, brown stool (last BM yesterday).   Of note, all of the patient's healthcare is typically managed at OSH ([facility]).   PCP: Slim Lovelace - [phone] Patient is a 89y Male DNR/DNI , +PPM, S/P IVC Filter - h/o atrial fibrillation on Xarelto, and prior UGIB (1990s, treated with coil embolization - as described by patient's healthcare proxy), BPH, CKD stage 3, Hypothyroid, Dementia - brought in by ambulance after large episode of coffee-ground emesis. Per patient's aides (who lives with the patient and attend to him daily), he had an episode of NBNB emesis the morning prior and then tolerated his evening meal. Then, this AM, he had this episode of "black vomit." In addition, his aides have noted that over the past day or so his mental status has been depressed. At baseline, the patient is demented (disoriented) but typically alert and interactive. He ambulates short distances within the home with assistance. However, for the past day he has been more lethargic and less interactive. He has not complained of any pain. No fever or chills noted by his aides. He is incontinent of urine and stool, and his aides report that he has been regularly urinating and passing soft, formed, brown stool (last BM yesterday). Pt uses walker to ambulate, no HX of Dysphagia, no fever, + RICARDO and signs of dehydration as per Labs noted, I started pt on Protonix Drip, NPO, IVF LR for now x 12 hours pt is on PO Lasix at home , pt is Calm no acute distress, accompanied with his HHA and a friend, S/P CT A/P no contrast tonight: small Ascites, nonobstructive stone in Rt renal pelvis, mildly dilated loops of small  bowel, in LLQ, enlarged prostate, Atrophic B/L kidneys, Occult Blood +, Pt was seen by Surgery tonight NO intervention as per surgery Note,     Of note, all of the patient's healthcare is typically managed at OSH ([facility]).   PCP: Slim Lovelace - [phone]  HCP: Freddie Mares- [phone]  Charlette NAZARIO with ICS: [phone]  GI: Dr. Robin- [phone]    Labs: UA: Large Blood, Na 143, K+ 5.3, BUN 54, Creatinine 2.05, glucose 144, LFT Normal, WBC 7.72, Hgb 13.3, Platelet 239, PT 11.5, PTT 30.3, INR 1.00, Occult Blood +, Lactate 2.4     Vitals: Tem 97.3, HR 72, /80, RR 18, 97% RA

## 2018-04-04 NOTE — H&P ADULT - PROBLEM SELECTOR PLAN 4
HX of A Fib, CHF, EF unknown, Hold Lasix for now due to RICARDO, Dehydration, GI Bleed,  IVF LR @ 75 cc/hr x 122 hours, F/U BMP, CBC, HX of A Fib, CHF, EF unknown, Hold Lasix for now due to RICARDO, Dehydration, GI Bleed,  IVF LR @ 75 cc/hr x 12 hours, F/U BMP, CBC,

## 2018-04-04 NOTE — ED ADULT TRIAGE NOTE - CHIEF COMPLAINT QUOTE
Patient brought to ER from home by EMS after he vomited coffee ground emesis times one as per family member.

## 2018-04-04 NOTE — ED PROVIDER NOTE - ATTENDING CONTRIBUTION TO CARE
Pt was seen and evaluated by me. Pt has dementia and family notes pt is usually more interactive but got up late today and was found to be more sleepy. Pt then after eating had an episode of vomiting that was described as black. Pt arosable but will not communicate. Pt noted to have some abd distention with tenderness to palpation. Lungs coarse b/l. Paced. No swelling to LE. + distal pulses.

## 2018-04-04 NOTE — H&P ADULT - PROBLEM SELECTOR PLAN 1
GI Consult House in AM, Surgery F/U, Protonix Drip, Serial CBC, NPO, IVF LR x 12 hours, Fall/aspiration precaution, Hold Xarelto for now,   S/P Coffee  Ground Emesis, HX of GI Bleed in the past ,  Iron studies, Ferritin, Type and Screen , GI Consult House in AM, Surgery F/U, Protonix Drip, Serial CBC, NPO, IVF LR x 12 hours, Fall/aspiration precaution, Hold Xarelto for now,   S/P Coffee  Ground Emesis, HX of GI Bleed in the past ,  Iron studies, Ferritin, Type and Screen ,  Hold Ferrous Sulfate for now,

## 2018-04-04 NOTE — H&P ADULT - PROBLEM SELECTOR PLAN 7
Check ammonia level, TSH, Vit B12, Folate, RPR, Iron studies, Ferritin   Fall/aspiration precaution,    PT Consult, NO HX of Dysphagia, uses walker  to ambulate with Help at home,

## 2018-04-04 NOTE — ED PROVIDER NOTE - PROGRESS NOTE DETAILS
Mansoor Martinez MD PGY4: Labs, imaging reviewed. Received protonix. Seen by surgery, who recommended medical admission. Case discussed w/ Dr. Gregory who accepted admission. MAR signout @ 1943

## 2018-04-05 LAB
ALBUMIN SERPL ELPH-MCNC: 3.4 G/DL — SIGNIFICANT CHANGE UP (ref 3.3–5)
ALBUMIN SERPL ELPH-MCNC: 3.4 G/DL — SIGNIFICANT CHANGE UP (ref 3.3–5)
ALP SERPL-CCNC: 55 U/L — SIGNIFICANT CHANGE UP (ref 40–120)
ALP SERPL-CCNC: 58 U/L — SIGNIFICANT CHANGE UP (ref 40–120)
ALT FLD-CCNC: 10 U/L — SIGNIFICANT CHANGE UP (ref 4–41)
ALT FLD-CCNC: 11 U/L — SIGNIFICANT CHANGE UP (ref 4–41)
AMMONIA BLD-MCNC: 17 UMOL/L — SIGNIFICANT CHANGE UP (ref 11–55)
AST SERPL-CCNC: 14 U/L — SIGNIFICANT CHANGE UP (ref 4–40)
AST SERPL-CCNC: 15 U/L — SIGNIFICANT CHANGE UP (ref 4–40)
BASOPHILS # BLD AUTO: 0.02 K/UL — SIGNIFICANT CHANGE UP (ref 0–0.2)
BASOPHILS # BLD AUTO: 0.04 K/UL — SIGNIFICANT CHANGE UP (ref 0–0.2)
BASOPHILS NFR BLD AUTO: 0.4 % — SIGNIFICANT CHANGE UP (ref 0–2)
BASOPHILS NFR BLD AUTO: 0.8 % — SIGNIFICANT CHANGE UP (ref 0–2)
BILIRUB SERPL-MCNC: 1 MG/DL — SIGNIFICANT CHANGE UP (ref 0.2–1.2)
BILIRUB SERPL-MCNC: 1.1 MG/DL — SIGNIFICANT CHANGE UP (ref 0.2–1.2)
BLD GP AB SCN SERPL QL: NEGATIVE — SIGNIFICANT CHANGE UP
BUN SERPL-MCNC: 51 MG/DL — HIGH (ref 7–23)
BUN SERPL-MCNC: 52 MG/DL — HIGH (ref 7–23)
CALCIUM SERPL-MCNC: 8.6 MG/DL — SIGNIFICANT CHANGE UP (ref 8.4–10.5)
CALCIUM SERPL-MCNC: 8.9 MG/DL — SIGNIFICANT CHANGE UP (ref 8.4–10.5)
CHLORIDE SERPL-SCNC: 106 MMOL/L — SIGNIFICANT CHANGE UP (ref 98–107)
CHLORIDE SERPL-SCNC: 108 MMOL/L — HIGH (ref 98–107)
CO2 SERPL-SCNC: 25 MMOL/L — SIGNIFICANT CHANGE UP (ref 22–31)
CO2 SERPL-SCNC: 25 MMOL/L — SIGNIFICANT CHANGE UP (ref 22–31)
CREAT SERPL-MCNC: 1.77 MG/DL — HIGH (ref 0.5–1.3)
CREAT SERPL-MCNC: 1.78 MG/DL — HIGH (ref 0.5–1.3)
EOSINOPHIL # BLD AUTO: 0.07 K/UL — SIGNIFICANT CHANGE UP (ref 0–0.5)
EOSINOPHIL # BLD AUTO: 0.2 K/UL — SIGNIFICANT CHANGE UP (ref 0–0.5)
EOSINOPHIL NFR BLD AUTO: 1.6 % — SIGNIFICANT CHANGE UP (ref 0–6)
EOSINOPHIL NFR BLD AUTO: 4.2 % — SIGNIFICANT CHANGE UP (ref 0–6)
FERRITIN SERPL-MCNC: 231.1 NG/ML — SIGNIFICANT CHANGE UP (ref 30–400)
FOLATE SERPL-MCNC: 8.2 NG/ML — SIGNIFICANT CHANGE UP (ref 4.7–20)
GLUCOSE SERPL-MCNC: 107 MG/DL — HIGH (ref 70–99)
GLUCOSE SERPL-MCNC: 122 MG/DL — HIGH (ref 70–99)
HAV IGM SER-ACNC: NONREACTIVE — SIGNIFICANT CHANGE UP
HBV CORE IGM SER-ACNC: NONREACTIVE — SIGNIFICANT CHANGE UP
HBV SURFACE AG SER-ACNC: NONREACTIVE — SIGNIFICANT CHANGE UP
HCT VFR BLD CALC: 33.9 % — LOW (ref 39–50)
HCT VFR BLD CALC: 36.9 % — LOW (ref 39–50)
HCT VFR BLD CALC: 37.4 % — LOW (ref 39–50)
HCV AB S/CO SERPL IA: 0.22 S/CO — SIGNIFICANT CHANGE UP
HCV AB SERPL-IMP: SIGNIFICANT CHANGE UP
HGB BLD-MCNC: 10.9 G/DL — LOW (ref 13–17)
HGB BLD-MCNC: 12 G/DL — LOW (ref 13–17)
HGB BLD-MCNC: 12.3 G/DL — LOW (ref 13–17)
IMM GRANULOCYTES # BLD AUTO: 0.01 # — SIGNIFICANT CHANGE UP
IMM GRANULOCYTES # BLD AUTO: 0.02 # — SIGNIFICANT CHANGE UP
IMM GRANULOCYTES NFR BLD AUTO: 0.2 % — SIGNIFICANT CHANGE UP (ref 0–1.5)
IMM GRANULOCYTES NFR BLD AUTO: 0.4 % — SIGNIFICANT CHANGE UP (ref 0–1.5)
IRON SATN MFR SERPL: 178 UG/DL — SIGNIFICANT CHANGE UP (ref 155–535)
IRON SATN MFR SERPL: 66 UG/DL — SIGNIFICANT CHANGE UP (ref 45–165)
LACTATE SERPL-SCNC: 1.1 MMOL/L — SIGNIFICANT CHANGE UP (ref 0.5–2)
LACTATE SERPL-SCNC: 1.9 MMOL/L — SIGNIFICANT CHANGE UP (ref 0.5–2)
LYMPHOCYTES # BLD AUTO: 1.04 K/UL — SIGNIFICANT CHANGE UP (ref 1–3.3)
LYMPHOCYTES # BLD AUTO: 1.09 K/UL — SIGNIFICANT CHANGE UP (ref 1–3.3)
LYMPHOCYTES # BLD AUTO: 23.1 % — SIGNIFICANT CHANGE UP (ref 13–44)
LYMPHOCYTES # BLD AUTO: 23.2 % — SIGNIFICANT CHANGE UP (ref 13–44)
MAGNESIUM SERPL-MCNC: 2.5 MG/DL — SIGNIFICANT CHANGE UP (ref 1.6–2.6)
MAGNESIUM SERPL-MCNC: 2.5 MG/DL — SIGNIFICANT CHANGE UP (ref 1.6–2.6)
MCHC RBC-ENTMCNC: 31.6 PG — SIGNIFICANT CHANGE UP (ref 27–34)
MCHC RBC-ENTMCNC: 31.9 PG — SIGNIFICANT CHANGE UP (ref 27–34)
MCHC RBC-ENTMCNC: 31.9 PG — SIGNIFICANT CHANGE UP (ref 27–34)
MCHC RBC-ENTMCNC: 32.1 % — SIGNIFICANT CHANGE UP (ref 32–36)
MCHC RBC-ENTMCNC: 32.2 % — SIGNIFICANT CHANGE UP (ref 32–36)
MCHC RBC-ENTMCNC: 33.3 % — SIGNIFICANT CHANGE UP (ref 32–36)
MCV RBC AUTO: 95.6 FL — SIGNIFICANT CHANGE UP (ref 80–100)
MCV RBC AUTO: 98.3 FL — SIGNIFICANT CHANGE UP (ref 80–100)
MCV RBC AUTO: 99.5 FL — SIGNIFICANT CHANGE UP (ref 80–100)
MONOCYTES # BLD AUTO: 0.57 K/UL — SIGNIFICANT CHANGE UP (ref 0–0.9)
MONOCYTES # BLD AUTO: 0.68 K/UL — SIGNIFICANT CHANGE UP (ref 0–0.9)
MONOCYTES NFR BLD AUTO: 12.1 % — SIGNIFICANT CHANGE UP (ref 2–14)
MONOCYTES NFR BLD AUTO: 15.1 % — HIGH (ref 2–14)
NEUTROPHILS # BLD AUTO: 2.67 K/UL — SIGNIFICANT CHANGE UP (ref 1.8–7.4)
NEUTROPHILS # BLD AUTO: 2.8 K/UL — SIGNIFICANT CHANGE UP (ref 1.8–7.4)
NEUTROPHILS NFR BLD AUTO: 59.4 % — SIGNIFICANT CHANGE UP (ref 43–77)
NEUTROPHILS NFR BLD AUTO: 59.5 % — SIGNIFICANT CHANGE UP (ref 43–77)
NRBC # FLD: 0 — SIGNIFICANT CHANGE UP
PHOSPHATE SERPL-MCNC: 3.6 MG/DL — SIGNIFICANT CHANGE UP (ref 2.5–4.5)
PHOSPHATE SERPL-MCNC: 3.8 MG/DL — SIGNIFICANT CHANGE UP (ref 2.5–4.5)
PLATELET # BLD AUTO: 192 K/UL — SIGNIFICANT CHANGE UP (ref 150–400)
PLATELET # BLD AUTO: 209 K/UL — SIGNIFICANT CHANGE UP (ref 150–400)
PLATELET # BLD AUTO: 221 K/UL — SIGNIFICANT CHANGE UP (ref 150–400)
PMV BLD: 9.7 FL — SIGNIFICANT CHANGE UP (ref 7–13)
PMV BLD: 9.7 FL — SIGNIFICANT CHANGE UP (ref 7–13)
PMV BLD: 9.9 FL — SIGNIFICANT CHANGE UP (ref 7–13)
POTASSIUM SERPL-MCNC: 4.2 MMOL/L — SIGNIFICANT CHANGE UP (ref 3.5–5.3)
POTASSIUM SERPL-MCNC: 4.2 MMOL/L — SIGNIFICANT CHANGE UP (ref 3.5–5.3)
POTASSIUM SERPL-SCNC: 4.2 MMOL/L — SIGNIFICANT CHANGE UP (ref 3.5–5.3)
POTASSIUM SERPL-SCNC: 4.2 MMOL/L — SIGNIFICANT CHANGE UP (ref 3.5–5.3)
PROT SERPL-MCNC: 6.3 G/DL — SIGNIFICANT CHANGE UP (ref 6–8.3)
PROT SERPL-MCNC: 6.8 G/DL — SIGNIFICANT CHANGE UP (ref 6–8.3)
RBC # BLD: 3.45 M/UL — LOW (ref 4.2–5.8)
RBC # BLD: 3.76 M/UL — LOW (ref 4.2–5.8)
RBC # BLD: 3.86 M/UL — LOW (ref 4.2–5.8)
RBC # FLD: 13.8 % — SIGNIFICANT CHANGE UP (ref 10.3–14.5)
RBC # FLD: 13.8 % — SIGNIFICANT CHANGE UP (ref 10.3–14.5)
RBC # FLD: 14 % — SIGNIFICANT CHANGE UP (ref 10.3–14.5)
RH IG SCN BLD-IMP: POSITIVE — SIGNIFICANT CHANGE UP
SODIUM SERPL-SCNC: 143 MMOL/L — SIGNIFICANT CHANGE UP (ref 135–145)
SODIUM SERPL-SCNC: 146 MMOL/L — HIGH (ref 135–145)
T PALLIDUM AB TITR SER: NEGATIVE — SIGNIFICANT CHANGE UP
T4 FREE SERPL-MCNC: 1.09 NG/DL — SIGNIFICANT CHANGE UP (ref 0.9–1.8)
TSH SERPL-MCNC: 3.66 UIU/ML — SIGNIFICANT CHANGE UP (ref 0.27–4.2)
UIBC SERPL-MCNC: 111.7 UG/DL — SIGNIFICANT CHANGE UP (ref 110–370)
VIT B12 SERPL-MCNC: 375 PG/ML — SIGNIFICANT CHANGE UP (ref 200–900)
WBC # BLD: 4.49 K/UL — SIGNIFICANT CHANGE UP (ref 3.8–10.5)
WBC # BLD: 4.63 K/UL — SIGNIFICANT CHANGE UP (ref 3.8–10.5)
WBC # BLD: 4.72 K/UL — SIGNIFICANT CHANGE UP (ref 3.8–10.5)
WBC # FLD AUTO: 4.49 K/UL — SIGNIFICANT CHANGE UP (ref 3.8–10.5)
WBC # FLD AUTO: 4.63 K/UL — SIGNIFICANT CHANGE UP (ref 3.8–10.5)
WBC # FLD AUTO: 4.72 K/UL — SIGNIFICANT CHANGE UP (ref 3.8–10.5)

## 2018-04-05 PROCEDURE — 99223 1ST HOSP IP/OBS HIGH 75: CPT | Mod: GC

## 2018-04-05 RX ORDER — SODIUM CHLORIDE 9 MG/ML
1000 INJECTION, SOLUTION INTRAVENOUS
Qty: 0 | Refills: 0 | Status: DISCONTINUED | OUTPATIENT
Start: 2018-04-05 | End: 2018-04-11

## 2018-04-05 RX ADMIN — Medication 1 APPLICATION(S): at 17:39

## 2018-04-05 RX ADMIN — Medication 1 APPLICATION(S): at 05:19

## 2018-04-05 RX ADMIN — PANTOPRAZOLE SODIUM 10 MG/HR: 20 TABLET, DELAYED RELEASE ORAL at 10:18

## 2018-04-05 RX ADMIN — FINASTERIDE 5 MILLIGRAM(S): 5 TABLET, FILM COATED ORAL at 12:34

## 2018-04-05 RX ADMIN — Medication 120 MILLIGRAM(S): at 05:18

## 2018-04-05 RX ADMIN — PANTOPRAZOLE SODIUM 10 MG/HR: 20 TABLET, DELAYED RELEASE ORAL at 22:20

## 2018-04-05 RX ADMIN — SODIUM CHLORIDE 75 MILLILITER(S): 9 INJECTION, SOLUTION INTRAVENOUS at 12:34

## 2018-04-05 RX ADMIN — Medication 100 MICROGRAM(S): at 05:18

## 2018-04-05 NOTE — PROVIDER CONTACT NOTE (OTHER) - SITUATION
Expiratory wheezing noted, pt also has a congested cough. Aide at bedside states "I give him full mask breathing treatments at home."

## 2018-04-05 NOTE — PHYSICAL THERAPY INITIAL EVALUATION ADULT - CRITERIA FOR SKILLED THERAPEUTIC INTERVENTIONS
rehab potential/predicted duration of therapy intervention/impairments found/anticipated discharge recommendation/anticipated equipment needs at discharge/therapy frequency

## 2018-04-05 NOTE — PROVIDER CONTACT NOTE (OTHER) - ASSESSMENT
VSS. Pt satting above 95% on room air. RR 18. Expiratory wheezing noted upon auscultation, however, during admission to unit assessment, no wheezing noted, pt had clear lung sounds. Pt shows no signs of acute distress at this time.

## 2018-04-05 NOTE — CONSULT NOTE ADULT - ASSESSMENT
IMP:    1) Coffee ground emesis: in setting of small bowel ileus vs low grade obstruction. Ddx: peptic ulcer disease vs erosive gastritis vs live cortez. H/H stable. Vitals stable. No further signs of overt bleeding currently  2) Small bowel ileus   3) Afib on xarelto    PLAN:  - keep NPO for now   - monitor H/H, transfuse accordingly  - PPI BID IV  - IV hydration, monitor and replete lytes  - encourage OOB, PT/OT   - hold xarelto for now   - no plans for urgent endoscopic evaluation at this time given stable hgb, no signs of overt bleeding, and small bowel ileus   - if signs of obstruction (ie vomiting, worsening abdominal distension) - place NG tube  - plan discussed with pt's family and pt's aid IMP:    1) Coffee ground emesis: in setting of small bowel ileus vs low grade obstruction. Ddx: peptic ulcer disease vs erosive gastritis vs live cortez. H/H stable. Vitals stable. No further signs of overt bleeding currently  2) Small bowel ileus   3) Afib on xarelto    PLAN:  - keep NPO for now   - monitor H/H, transfuse accordingly  - PPI BID IV  - IV hydration, monitor and replete lytes  - encourage OOB, PT/OT   - hold xarelto for now   - no plans for urgent endoscopic evaluation at this time given stable hgb, no signs of overt bleeding, and small bowel ileus   - if signs of obstruction (ie vomiting, worsening abdominal distension) - place NG tube  - would pursue conservative approach given age, numerous comorbidities and absence of clinically significant overt GI bleeding - plan discussed with pt's family and pt's aid - they are in agreement IMP:    1) Coffee ground emesis: in setting of small bowel ileus vs low grade obstruction. Ddx: peptic ulcer disease vs erosive gastritis vs live cortez. H/H stable. Vitals stable. No further signs of overt bleeding currently  2) Small bowel ileus   3) Afib on xarelto    PLAN:  - please administer enemas to help with fecal impaction seen on CT  - keep NPO for now   - monitor H/H, transfuse accordingly  - PPI BID IV  - IV hydration, monitor and replete lytes  - encourage OOB, PT/OT   - hold xarelto for now--need to consider the role of anticoagulation in this elderly patient  - no plans for urgent endoscopic evaluation at this time given stable hgb, no signs of overt bleeding, and small bowel ileus   - if signs of obstruction (ie vomiting, worsening abdominal distension) - place NG tube  - would pursue conservative approach given age, numerous comorbidities and absence of clinically significant overt GI bleeding - plan discussed with pt's family and pt's aid - they are in agreement

## 2018-04-05 NOTE — CONSULT NOTE ADULT - SUBJECTIVE AND OBJECTIVE BOX
Salo Alvarez MD  Interventional Cardiology  [place] Office : [phone] [place] [address] [place] [address]  Tel:   [place] Office : [phone] [facility] [place] [address]  Tel: [phone]  Cell : [phone]    HISTORY OF PRESENT ILLNESS:  Patient is a 89y Male DNR/DNI , +PPM, S/P IVC Filter - h/o atrial fibrillation on Xarelto, and prior UGIB (1990s, treated with coil embolization - as described by patient's healthcare proxy), BPH, CKD stage 3, Hypothyroid, Dementia - brought in by ambulance after large episode of coffee-ground emesis. Per patient's aides (who lives with the patient and attend to him daily), he had an episode of NBNB emesis the morning prior and then tolerated his evening meal. Then, this AM, he had this episode of "black vomit." In addition, his aides have noted that over the past day or so his mental status has been depressed. At baseline, the patient is demented (disoriented) but typically alert and interactive. He ambulates short distances within the home with assistance. However, for the past day he has been more lethargic and less interactive. He has not complained of any pain. No fever or chills noted by his aides. He is incontinent of urine and stool, and his aides report that he has been regularly urinating and passing soft, formed, brown stool (last BM yesterday). Pt uses walker to ambulate, no HX of Dysphagia, no fever, + RICARDO and signs of dehydration as per Labs noted, I started pt on Protonix Drip, NPO, IVF LR for now x 12 hours pt is on PO Lasix at home , pt is Calm no acute distress, accompanied with his HHA and a friend, S/P CT A/P no contrast tonight: small Ascites, nonobstructive stone in Rt renal pelvis, mildly dilated loops of small  bowel, in LLQ, enlarged prostate, Atrophic B/L kidneys, Occult Blood +, Pt was seen by Surgery tonight NO intervention as per surgery Note, also seen by GI conservative management    PAST MEDICAL & SURGICAL HISTORY:  S/P IVC filter  Pacemaker  BPH (benign prostatic hyperplasia)  GI bleed  Atrial fibrillation, chronic  Hypothyroid  CKD (chronic kidney disease) stage 3, GFR 30-59 ml/min  Congestive heart failure  Dementia  S/P IVC filter  Artificial cardiac pacemaker  History of hip replacement, total, left    	    MEDICATIONS:  verapamil  milliGRAM(s) Oral daily  pantoprazole Infusion 8 mG/Hr IV Continuous <Continuous>  finasteride 5 milliGRAM(s) Oral daily  levothyroxine 100 MICROGram(s) Oral daily  ammonium lactate 12% Lotion 1 Application(s) Topical two times a day  lactated ringers. 1000 milliLiter(s) IV Continuous <Continuous>      FAMILY HISTORY:  No pertinent family history in first degree relatives        Allergies    penicillin (Hives)  penicillins (Unknown)    Intolerances    	      PHYSICAL EXAM:  T(C): 36.8 (04-05-18 @ 17:06), Max: 36.8 (04-05-18 @ 09:06)  HR: 70 (04-05-18 @ 17:06) (68 - 75)  BP: 125/63 (04-05-18 @ 17:06) (121/64 - 143/80)  RR: 16 (04-05-18 @ 17:06) (16 - 18)  SpO2: 98% (04-05-18 @ 17:06) (97% - 98%)  Wt(kg): --  I&O's Summary    04 Apr 2018 07:01  -  05 Apr 2018 07:00  --------------------------------------------------------  IN: 0 mL / OUT: 0 mL / NET: 0 mL    05 Apr 2018 07:01  -  05 Apr 2018 20:00  --------------------------------------------------------  IN: 0 mL / OUT: 1 mL / NET: -1 mL        Appearance: Normal	  HEENT:   Normal oral mucosa, PERRL, EOMI	  Cardiovascular: Normal S1 S2, No JVD, No murmurs, No edema s/p PPM  Respiratory: Lungs clear to auscultation	  Gastrointestinal:  Soft, Non-tender, + BS	  Extremities: Normal range of motion, No clubbing, cyanosis or edema    LABS:	 	    CARDIAC MARKERS:                                  10.9   4.63  )-----------( 192      ( 05 Apr 2018 15:30 )             33.9     04-05    146<H>  |  108<H>  |  51<H>  ----------------------------<  107<H>  4.2   |  25  |  1.78<H>    Ca    8.6      05 Apr 2018 06:59  Phos  3.6     04-05  Mg     2.5     04-05    TPro  6.3  /  Alb  3.4  /  TBili  1.1  /  DBili  x   /  AST  14  /  ALT  11  /  AlkPhos  55  04-05    proBNP:   Lipid Profile:   HgA1c:   TSH: Thyroid Stimulating Hormone, Serum: 3.66 uIU/mL (04-04 @ 23:45)      ASSESSMENT/PLAN:

## 2018-04-05 NOTE — PROVIDER CONTACT NOTE (OTHER) - SITUATION
Pt hasn't urinated since admission to unit at 2130. As per bladder scan, 320ml of urine retention. Straight cath protocol initiated, unable to straight cath pt after 3 attempts, resistance met.

## 2018-04-05 NOTE — PHYSICAL THERAPY INITIAL EVALUATION ADULT - PLANNED THERAPY INTERVENTIONS, PT EVAL
balance training/bed mobility training/transfer training/gait training/postural re-education/strengthening/ROM

## 2018-04-05 NOTE — PHYSICAL THERAPY INITIAL EVALUATION ADULT - RANGE OF MOTION EXAMINATION, REHAB EVAL
Bilateral UE and LE ~3/4 range/bilateral lower extremity ROM was WFL (within functional limits)/bilateral upper extremity ROM was WFL (within functional limits)

## 2018-04-05 NOTE — PHYSICAL THERAPY INITIAL EVALUATION ADULT - GENERAL OBSERVATIONS, REHAB EVAL
Consult received, chart reviewed. Patient received supine in bed, NAD, +IV, family friend and health aide present. Patient agreed to EVALUATION from Physical Therapist.

## 2018-04-05 NOTE — PHYSICAL THERAPY INITIAL EVALUATION ADULT - ADDITIONAL COMMENTS
Pt. friend and health aide who are present report that patient ambulates minimal household distances with rolling walker +/- assist. Pt. also owns wheelchair, yolande lift, commode, grab bars.     Pt left supine as received, NAD, all lines intact, health aide present.

## 2018-04-05 NOTE — CONSULT NOTE ADULT - ASSESSMENT
EKG - A sense V paced   Echo - pending    1) GI bleed - hold xarelto had h/o GI bleed, conservative management, will keep off xarelto sec to GI bleed and age, cont iV PPI     2) aFIB - hold xarelto cont verapamil, get 2d echo     3) Hypothyroidism - cont synthroid

## 2018-04-05 NOTE — CONSULT NOTE ADULT - SUBJECTIVE AND OBJECTIVE BOX
Chief Complaint:  Patient is a 89y old  Male who presents with a chief complaint of GI  BLEED, dehydration, Coffee Ground Emesis, RICARDO, (2018 20:22)      HPI: 89M w/hx dementia, afib on xarelto, +PPM, S/P IVC Filter, prior UGIB (, treated with coil embolization - as described by patient's healthcare proxy), BPH, CKD brought in by ambulance after large episode of coffee-ground emesis. Per patient's aides (who lives with the patient and attend to him daily), he had an episode of NBNB emesis the morning prior and then tolerated his evening meal. Then, this AM, he had this episode of "black vomit." In addition, his aides have noted that over the past day or so his mental status has been depressed. At baseline, the patient is demented (disoriented) but typically alert and interactive. He ambulates short distances within the home with assistance. However, for the past day he has been more lethargic and less interactive. He has not complained of any pain. No fever or chills noted by his aides. He is incontinent of urine and stool, and his aides report that he has been regularly urinating and passing soft, formed, brown stool (last BM yesterday).     Allergies:  penicillin (Hives)  penicillins (Unknown)      Home Medications:    Hospital Medications:  ammonium lactate 12% Lotion 1 Application(s) Topical two times a day  finasteride 5 milliGRAM(s) Oral daily  lactated ringers. 1000 milliLiter(s) IV Continuous <Continuous>  levothyroxine 100 MICROGram(s) Oral daily  pantoprazole Infusion 8 mG/Hr IV Continuous <Continuous>  verapamil  milliGRAM(s) Oral daily      PMHX/PSHX:  S/P IVC filter  Pacemaker  BPH (benign prostatic hyperplasia)  GI bleed  Atrial fibrillation, chronic  Hypothyroid  CKD (chronic kidney disease) stage 3, GFR 30-59 ml/min  Congestive heart failure  Dementia  No pertinent past medical history  S/P IVC filter  Artificial cardiac pacemaker  History of hip replacement, total, left      Family history:  No pertinent family history in first degree relatives      Social History:     ROS: as per HPI       PHYSICAL EXAM:   Vital Signs:  Vital Signs Last 24 Hrs  T(C): 36.4 (2018 05:06), Max: 37.7 (2018 15:38)  T(F): 97.5 (2018 05:06), Max: 99.9 (2018 15:38)  HR: 69 (2018 05:06) (69 - 81)  BP: 121/64 (2018 05:06) (121/64 - 163/95)  BP(mean): --  RR: 18 (2018 05:06) (16 - 18)  SpO2: 97% (2018 05:06) (97% - 99%)  Daily Height in cm: 170.18 (2018 21:27)    Daily     GENERAL:  NAD  HEENT:  sclera -anicteric  CHEST:   breath sounds clear  HEART:  Regular rhythm  ABDOMEN:  Soft, distended, tympanic, nontender, decreased bs   EXTEREMITIES:  no cyanosis,clubbing or edema  SKIN:  No rash  NEURO:  Alert    LABS:                        12.3   4.49  )-----------( 209      ( 2018 23:45 )             36.9     04-04    143  |  106  |  52<H>  ----------------------------<  122<H>  4.2   |  25  |  1.77<H>    Ca    8.9      2018 23:45  Phos  3.8     04-04  Mg     2.5     -04    TPro  6.8  /  Alb  3.4  /  TBili  1.0  /  DBili  x   /  AST  15  /  ALT  10  /  AlkPhos  58  04-04    LIVER FUNCTIONS - ( 2018 23:45 )  Alb: 3.4 g/dL / Pro: 6.8 g/dL / ALK PHOS: 58 u/L / ALT: 10 u/L / AST: 15 u/L / GGT: x           PT/INR - ( 2018 14:45 )   PT: 11.5 SEC;   INR: 1.00          PTT - ( 2018 14:45 )  PTT:30.3 SEC  Urinalysis Basic - ( 2018 19:30 )    Color: YELLOW / Appearance: CLEAR / S.026 / pH: 5.5  Gluc: NEGATIVE / Ketone: NEGATIVE  / Bili: NEGATIVE / Urobili: NORMAL mg/dL   Blood: LARGE / Protein: 100 mg/dL / Nitrite: NEGATIVE   Leuk Esterase: NEGATIVE / RBC: 10-25 / WBC 5-10   Sq Epi: x / Non Sq Epi: x / Bacteria: FEW      Amylase Serum--      Lipase serum--       [id number]  Amylase Serum--      Lipase serum15.7       Ammonia--      Imaging:        EXAM:  CT ABDOMEN AND PELVIS        PROCEDURE DATE:  2018         INTERPRETATION:  CLINICAL INFORMATION: Abdominal tenderness. Vomiting.    COMPARISON: None.    PROCEDURE:   CT of the Abdomen and Pelvis was performed without intravenous contrast.   Intravenous contrast: None.  Oral contrast: None.  Sagittal and coronal reformats were performed.    FINDINGS:    LOWER CHEST: Cardiomegaly. Coronary artery calcifications. Pacemaker   leads are noted. Bibasilar subsegmental atelectasis.    LIVER: Scattered punctate hepatic calcified granulomas.  BILE DUCTS: Normal caliber.  GALLBLADDER: Cholelithiasis.  SPLEEN: Within normal limits.  PANCREAS: Atrophic.  ADRENALS: Within normal limits.  KIDNEYS/URETERS: 1.1 cm nonobstructive stone in the right renal pelvis.   Bilateral renal cysts. Atrophic bilateral kidneys.    BLADDER: Within normal limits.  REPRODUCTIVE ORGANS: The prostate is enlarged.    BOWEL: Small hiatal hernia. Mildly dilated loops of small bowel in the   left lower quadrant without a discrete transition point. Stool noted   within the rectum. Appendix is not visualized.  PERITONEUM: Small volume of ascites.  VESSELS:  Infrarenal IVC filter is noted. Atherosclerotic calcifications   of the aorta and its branches.  RETROPERITONEUM: No lymphadenopathy.    ABDOMINAL WALL: Within normal limits.  BONES: Degenerative changes of spine. Mild retrolisthesis of L1 on L2.    IMPRESSION:   Mildly dilated loops of small bowel in the left lower quadrant without a   discrete transition point concerning for a low-grade bowel obstruction.   Small amount of intra-abdominal ascites.    Nonobstructive stone in the right renal pelvis. No evidence of   hydroureter or hydronephrosis.  Cholelithiasis.    Findings were discussed with Dr. Martinez by Dr. Doan on   2018 at 6:00 PM with read back.              CELESTINO DOAN M.D., RADIOLOGY RESIDENT  This document has been electronically signed.  BRIELLE REYES M.D., ATTENDING RADIOLOGIST  This document has been electronically signed. 2018  6:02PM

## 2018-04-05 NOTE — PHYSICAL THERAPY INITIAL EVALUATION ADULT - PERTINENT HX OF CURRENT PROBLEM, REHAB EVAL
Pt. presents with hx of PPM, S/P IVC Filter - h/o atrial fibrillation on Xarelto, and prior UGIB (1990s, treated with coil embolization - as described by patient's healthcare proxy), BPH, CKD stage 3, Hypothyroid, Dementia - brought in by ambulance after large episode of coffee-ground emesis

## 2018-04-05 NOTE — PROVIDER CONTACT NOTE (OTHER) - ASSESSMENT
Distress noted during attempts to straight cath pt. Dried blood noted to tip of penis prior to attempts. Abdomen remains distended and firm. VSS. NAD noted while pt is resting.

## 2018-04-05 NOTE — PHYSICAL THERAPY INITIAL EVALUATION ADULT - LEVEL OF INDEPENDENCE, REHAB EVAL
Pt. appears tired at this time; friend who is present prefers pt. to rest at this time, despite +encouragement. PT will continue to follow./unable to perform

## 2018-04-05 NOTE — CHART NOTE - NSCHARTNOTEFT_GEN_A_CORE
Patient noted not to have any urinary output since arrived to the floor.  Bladder Scan done showed >300 cc urine in bladder.  Will initiate straight cath protocol for urinary retention.

## 2018-04-06 DIAGNOSIS — E87.0 HYPEROSMOLALITY AND HYPERNATREMIA: ICD-10-CM

## 2018-04-06 DIAGNOSIS — N17.9 ACUTE KIDNEY FAILURE, UNSPECIFIED: ICD-10-CM

## 2018-04-06 LAB
BACTERIA UR CULT: SIGNIFICANT CHANGE UP
BUN SERPL-MCNC: 44 MG/DL — HIGH (ref 7–23)
CALCIUM SERPL-MCNC: 8.5 MG/DL — SIGNIFICANT CHANGE UP (ref 8.4–10.5)
CHLORIDE SERPL-SCNC: 111 MMOL/L — HIGH (ref 98–107)
CO2 SERPL-SCNC: 20 MMOL/L — LOW (ref 22–31)
CREAT SERPL-MCNC: 1.53 MG/DL — HIGH (ref 0.5–1.3)
GLUCOSE SERPL-MCNC: 77 MG/DL — SIGNIFICANT CHANGE UP (ref 70–99)
HCT VFR BLD CALC: 35.8 % — LOW (ref 39–50)
HGB BLD-MCNC: 11.4 G/DL — LOW (ref 13–17)
MAGNESIUM SERPL-MCNC: 2.3 MG/DL — SIGNIFICANT CHANGE UP (ref 1.6–2.6)
MCHC RBC-ENTMCNC: 31.8 % — LOW (ref 32–36)
MCHC RBC-ENTMCNC: 31.8 PG — SIGNIFICANT CHANGE UP (ref 27–34)
MCV RBC AUTO: 100 FL — SIGNIFICANT CHANGE UP (ref 80–100)
NRBC # FLD: 0 — SIGNIFICANT CHANGE UP
PLATELET # BLD AUTO: 181 K/UL — SIGNIFICANT CHANGE UP (ref 150–400)
PMV BLD: 10.2 FL — SIGNIFICANT CHANGE UP (ref 7–13)
POTASSIUM SERPL-MCNC: 4.2 MMOL/L — SIGNIFICANT CHANGE UP (ref 3.5–5.3)
POTASSIUM SERPL-SCNC: 4.2 MMOL/L — SIGNIFICANT CHANGE UP (ref 3.5–5.3)
RBC # BLD: 3.58 M/UL — LOW (ref 4.2–5.8)
RBC # FLD: 13.6 % — SIGNIFICANT CHANGE UP (ref 10.3–14.5)
SODIUM SERPL-SCNC: 146 MMOL/L — HIGH (ref 135–145)
SPECIMEN SOURCE: SIGNIFICANT CHANGE UP
WBC # BLD: 5.08 K/UL — SIGNIFICANT CHANGE UP (ref 3.8–10.5)
WBC # FLD AUTO: 5.08 K/UL — SIGNIFICANT CHANGE UP (ref 3.8–10.5)

## 2018-04-06 RX ORDER — SODIUM CHLORIDE 9 MG/ML
1000 INJECTION, SOLUTION INTRAVENOUS
Qty: 0 | Refills: 0 | Status: COMPLETED | OUTPATIENT
Start: 2018-04-06 | End: 2018-04-07

## 2018-04-06 RX ORDER — DOCUSATE SODIUM 100 MG
100 CAPSULE ORAL ONCE
Qty: 0 | Refills: 0 | Status: DISCONTINUED | OUTPATIENT
Start: 2018-04-06 | End: 2018-04-06

## 2018-04-06 RX ORDER — POLYETHYLENE GLYCOL 3350 17 G/17G
17 POWDER, FOR SOLUTION ORAL
Qty: 0 | Refills: 0 | Status: DISCONTINUED | OUTPATIENT
Start: 2018-04-06 | End: 2018-04-14

## 2018-04-06 RX ORDER — DOCUSATE SODIUM 100 MG
100 CAPSULE ORAL
Qty: 0 | Refills: 0 | Status: DISCONTINUED | OUTPATIENT
Start: 2018-04-06 | End: 2018-04-14

## 2018-04-06 RX ORDER — SENNA PLUS 8.6 MG/1
2 TABLET ORAL AT BEDTIME
Qty: 0 | Refills: 0 | Status: DISCONTINUED | OUTPATIENT
Start: 2018-04-06 | End: 2018-04-14

## 2018-04-06 RX ORDER — DOCUSATE SODIUM 100 MG
100 CAPSULE ORAL
Qty: 0 | Refills: 0 | Status: DISCONTINUED | OUTPATIENT
Start: 2018-04-06 | End: 2018-04-06

## 2018-04-06 RX ADMIN — PANTOPRAZOLE SODIUM 10 MG/HR: 20 TABLET, DELAYED RELEASE ORAL at 23:06

## 2018-04-06 RX ADMIN — SENNA PLUS 2 TABLET(S): 8.6 TABLET ORAL at 23:06

## 2018-04-06 RX ADMIN — Medication 1 APPLICATION(S): at 17:36

## 2018-04-06 RX ADMIN — Medication 100 MICROGRAM(S): at 05:41

## 2018-04-06 RX ADMIN — FINASTERIDE 5 MILLIGRAM(S): 5 TABLET, FILM COATED ORAL at 12:46

## 2018-04-06 RX ADMIN — Medication 100 MILLIGRAM(S): at 13:59

## 2018-04-06 RX ADMIN — POLYETHYLENE GLYCOL 3350 17 GRAM(S): 17 POWDER, FOR SOLUTION ORAL at 17:35

## 2018-04-06 RX ADMIN — Medication 1 APPLICATION(S): at 05:41

## 2018-04-06 RX ADMIN — PANTOPRAZOLE SODIUM 10 MG/HR: 20 TABLET, DELAYED RELEASE ORAL at 09:47

## 2018-04-06 RX ADMIN — Medication 100 MILLIGRAM(S): at 17:35

## 2018-04-06 RX ADMIN — Medication 120 MILLIGRAM(S): at 05:41

## 2018-04-06 RX ADMIN — POLYETHYLENE GLYCOL 3350 17 GRAM(S): 17 POWDER, FOR SOLUTION ORAL at 12:46

## 2018-04-06 NOTE — CONSULT NOTE ADULT - SUBJECTIVE AND OBJECTIVE BOX
Dr. Maya  Office [phone]  Cell [phone]  Charity THOMAS  Cell [phone]    HPI:  Patient is a 89y Male DNR/DNI , +PPM, S/P IVC Filter - h/o atrial fibrillation on Xarelto, and prior UGIB (, treated with coil embolization - as described by patient's healthcare proxy), BPH, CKD stage 3, Hypothyroid, Dementia - brought in by ambulance after large episode of coffee-ground emesis. Being followed by GI. Had worsened renal function on admission. Offers no complain at present. He has dementia and says no for everything.           Allergies:  penicillin (Hives)  penicillins (Unknown)      PAST MEDICAL & SURGICAL HISTORY:  S/P IVC filter  Pacemaker  BPH (benign prostatic hyperplasia)  GI bleed  Atrial fibrillation, chronic  Hypothyroid  CKD (chronic kidney disease) stage 3, GFR 30-59 ml/min  Congestive heart failure  Dementia  S/P IVC filter  Artificial cardiac pacemaker  History of hip replacement, total, left      Home Medications Reviewed    Hospital Medications:   MEDICATIONS  (STANDING):  ammonium lactate 12% Lotion 1 Application(s) Topical two times a day  docusate sodium Liquid 100 milliGRAM(s) Oral two times a day  finasteride 5 milliGRAM(s) Oral daily  lactated ringers. 1000 milliLiter(s) (75 mL/Hr) IV Continuous <Continuous>  levothyroxine 100 MICROGram(s) Oral daily  pantoprazole Infusion 8 mG/Hr (10 mL/Hr) IV Continuous <Continuous>  polyethylene glycol 3350 17 Gram(s) Oral two times a day  senna 2 Tablet(s) Oral at bedtime  verapamil  milliGRAM(s) Oral daily      SOCIAL HISTORY:  Denies ETOh, Smoking,     FAMILY HISTORY:  No pertinent family history in first degree relatives      REVIEW OF SYSTEMS:  CONSTITUTIONAL: No weakness, fevers or chills  EYES/ENT: No visual changes;  No vertigo or throat pain   NECK: No pain or stiffness  RESPIRATORY: No cough, wheezing, hemoptysis; No shortness of breath  CARDIOVASCULAR: No chest pain or palpitations.  GASTROINTESTINAL: No abdominal or epigastric pain. No nausea, vomiting, or hematemesis; No diarrhea or constipation. No melena or hematochezia.  GENITOURINARY: No dysuria, frequency, foamy urine, urinary urgency, incontinence or hematuria  NEUROLOGICAL: No numbness or weakness  SKIN: No itching, burning, rashes, or lesions   VASCULAR: No bilateral lower extremity edema.   All other review of systems is negative unless indicated above.    VITALS:  T(F): 97.4 (18 @ 12:19), Max: 97.6 (18 @ 05:39)  HR: 84 (18 @ 12:19)  BP: 133/76 (18 @ 12:19)  RR: 19 (18 @ 12:19)  SpO2: 95% (18 @ 12:19)  Wt(kg): --     @ 07:01  -   @ 07:00  --------------------------------------------------------  IN: 0 mL / OUT: 1 mL / NET: -1 mL          PHYSICAL EXAM:  Constitutional: NAD  HEENT: anicteric sclera, oropharynx clear, MMM  Neck: No JVD  Respiratory: CTAB, no wheezes, rales or rhonchi  Cardiovascular: S1, S2, RRR  Gastrointestinal: BS+, soft, NT/ND  Extremities: No cyanosis or clubbing. No peripheral edema  Neurological: could not be evaluated in view of dementia  : No CVA tenderness. No muro.   Skin: No rashes       LABS:      146<H>  |  111<H>  |  44<H>  ----------------------------<  77  4.2   |  20<L>  |  1.53<H>    Ca    8.5      2018 06:30  Phos  3.6     04-05  Mg     2.3     04-06    TPro  6.3  /  Alb  3.4  /  TBili  1.1  /  DBili      /  AST  14  /  ALT  11  /  AlkPhos  55  04-05    Creatinine Trend: 1.53 <--, 1.78 <--, 1.77 <--, 2.05 <--                        11.4   5.08  )-----------( 181      ( 2018 06:30 )             35.8     Urine Studies:  Urinalysis Basic - ( 2018 19:30 )    Color: YELLOW / Appearance: CLEAR / S.026 / pH: 5.5  Gluc: NEGATIVE / Ketone: NEGATIVE  / Bili: NEGATIVE / Urobili: NORMAL mg/dL   Blood: LARGE / Protein: 100 mg/dL / Nitrite: NEGATIVE   Leuk Esterase: NEGATIVE / RBC: 10-25 / WBC 5-10   Sq Epi:  / Non Sq Epi:  / Bacteria: FEW          RADIOLOGY & ADDITIONAL STUDIES:

## 2018-04-06 NOTE — CONSULT NOTE ADULT - ASSESSMENT
Patient is a 89y Male DNR/DNI , +PPM, S/P IVC Filter - h/o atrial fibrillation on Xarelto, and prior UGIB (1990s, treated with coil embolization - as described by patient's healthcare proxy), BPH, CKD stage 3, Hypothyroid, Dementia - brought in by ambulance after large episode of coffee-ground emesis. Being followed by GI. Had worsened renal function on admission.

## 2018-04-06 NOTE — CONSULT NOTE ADULT - PROBLEM SELECTOR RECOMMENDATION 3
Likely sec to dehydration  Has h/o CHF  Was on lasix at home  Clinically euvolemic  Use D5W 50cc/hr for 1L Likely sec to dehydration. Urine Sp gravity 1.026  Has h/o CHF  Was on lasix at home  Clinically euvolemic  Use D5W 50cc/hr for 1L

## 2018-04-07 LAB
BUN SERPL-MCNC: 35 MG/DL — HIGH (ref 7–23)
CALCIUM SERPL-MCNC: 9.1 MG/DL — SIGNIFICANT CHANGE UP (ref 8.4–10.5)
CHLORIDE SERPL-SCNC: 113 MMOL/L — HIGH (ref 98–107)
CO2 SERPL-SCNC: 25 MMOL/L — SIGNIFICANT CHANGE UP (ref 22–31)
CREAT SERPL-MCNC: 1.51 MG/DL — HIGH (ref 0.5–1.3)
GLUCOSE SERPL-MCNC: 122 MG/DL — HIGH (ref 70–99)
HCT VFR BLD CALC: 35.3 % — LOW (ref 39–50)
HGB BLD-MCNC: 11.2 G/DL — LOW (ref 13–17)
MCHC RBC-ENTMCNC: 30.9 PG — SIGNIFICANT CHANGE UP (ref 27–34)
MCHC RBC-ENTMCNC: 31.7 % — LOW (ref 32–36)
MCV RBC AUTO: 97.5 FL — SIGNIFICANT CHANGE UP (ref 80–100)
NRBC # FLD: 0 — SIGNIFICANT CHANGE UP
PLATELET # BLD AUTO: 198 K/UL — SIGNIFICANT CHANGE UP (ref 150–400)
PMV BLD: 9.9 FL — SIGNIFICANT CHANGE UP (ref 7–13)
POTASSIUM SERPL-MCNC: 4.1 MMOL/L — SIGNIFICANT CHANGE UP (ref 3.5–5.3)
POTASSIUM SERPL-SCNC: 4.1 MMOL/L — SIGNIFICANT CHANGE UP (ref 3.5–5.3)
RBC # BLD: 3.62 M/UL — LOW (ref 4.2–5.8)
RBC # FLD: 13.3 % — SIGNIFICANT CHANGE UP (ref 10.3–14.5)
SODIUM SERPL-SCNC: 149 MMOL/L — HIGH (ref 135–145)
WBC # BLD: 4.46 K/UL — SIGNIFICANT CHANGE UP (ref 3.8–10.5)
WBC # FLD AUTO: 4.46 K/UL — SIGNIFICANT CHANGE UP (ref 3.8–10.5)

## 2018-04-07 RX ADMIN — SENNA PLUS 2 TABLET(S): 8.6 TABLET ORAL at 22:30

## 2018-04-07 RX ADMIN — Medication 1 APPLICATION(S): at 17:47

## 2018-04-07 RX ADMIN — SODIUM CHLORIDE 50 MILLILITER(S): 9 INJECTION, SOLUTION INTRAVENOUS at 02:34

## 2018-04-07 RX ADMIN — Medication 120 MILLIGRAM(S): at 05:03

## 2018-04-07 RX ADMIN — PANTOPRAZOLE SODIUM 10 MG/HR: 20 TABLET, DELAYED RELEASE ORAL at 17:51

## 2018-04-07 RX ADMIN — Medication 100 MILLIGRAM(S): at 05:03

## 2018-04-07 RX ADMIN — FINASTERIDE 5 MILLIGRAM(S): 5 TABLET, FILM COATED ORAL at 13:35

## 2018-04-07 RX ADMIN — Medication 100 MICROGRAM(S): at 05:03

## 2018-04-07 RX ADMIN — POLYETHYLENE GLYCOL 3350 17 GRAM(S): 17 POWDER, FOR SOLUTION ORAL at 17:47

## 2018-04-07 RX ADMIN — Medication 1 APPLICATION(S): at 05:01

## 2018-04-07 RX ADMIN — POLYETHYLENE GLYCOL 3350 17 GRAM(S): 17 POWDER, FOR SOLUTION ORAL at 05:01

## 2018-04-07 RX ADMIN — Medication 100 MILLIGRAM(S): at 17:47

## 2018-04-07 NOTE — SWALLOW BEDSIDE ASSESSMENT ADULT - SWALLOW EVAL: RECOMMENDED FEEDING/EATING TECHNIQUES
no straws/position upright (90 degrees)/alternate food with liquid/maintain upright posture during/after eating for 30 mins/small sips/bites

## 2018-04-07 NOTE — SWALLOW BEDSIDE ASSESSMENT ADULT - ORAL PREPARATORY PHASE
reduced bolus formation and prolonged oral preparatory phase redcued bolus formation, rapid anterior/posterior movement over base of tongue for thin liquids. reduced bolus formation

## 2018-04-07 NOTE — SWALLOW BEDSIDE ASSESSMENT ADULT - ORAL PHASE
Decreased anterior-posterior movement of the bolus/Delayed oral transit time Within functional limits Delayed oral transit time/Decreased anterior-posterior movement of the bolus

## 2018-04-07 NOTE — SWALLOW BEDSIDE ASSESSMENT ADULT - SWALLOW EVAL: PROGNOSIS
IMPRESSIONS CONTINUED: 4.) Patient presented with pharyngeal stage dysphagia for thin liquids and nectar thickened liquids marked by mildly delayed swallow trigger, reduced hyolaryngeal elevation upon digital palpation and overt signs and symptoms of penetration/aspiration in the form of reflexive delayed coughing.

## 2018-04-07 NOTE — SWALLOW BEDSIDE ASSESSMENT ADULT - PHARYNGEAL PHASE
Delayed pharyngeal swallow/Decreased laryngeal elevation/no overt s/s of penetration/aspiration Delayed pharyngeal swallow/Decreased laryngeal elevation/Cough post oral intake/Delayed cough post oral intake Delayed pharyngeal swallow/no overt s/s of penetration/aspiration/Decreased laryngeal elevation

## 2018-04-07 NOTE — SWALLOW BEDSIDE ASSESSMENT ADULT - SWALLOW EVAL: DIAGNOSIS
1.) Patient presents with mild-moderate oral stage skills for puree, nectar thickened liquids and honey thickened liquids marked by adequate bolus acceptance, reduced bolus formation, delayed oral transit time with oral holding (moving around in mouth) prior to anterior-posterior transfer and adequate clearance.  2.) Patient presented with severe oral stage dysphagia for thin liquids marked by severely reduced bolus formation/manipulation with rapid anterior-posterior movement over base of tongue (as noted with mouth open) with suspected premature spillage.  3.) Pharyngeal stage of swallow for puree and honey thickened liquids marked by mild-moderate delayed swallow trigger, reduced hyolaryngeal elevation upon digital palpation and no overt s/s of penetration/aspiration.

## 2018-04-08 LAB
BASOPHILS # BLD AUTO: 0.02 K/UL — SIGNIFICANT CHANGE UP (ref 0–0.2)
BASOPHILS NFR BLD AUTO: 0.3 % — SIGNIFICANT CHANGE UP (ref 0–2)
BUN SERPL-MCNC: 28 MG/DL — HIGH (ref 7–23)
CALCIUM SERPL-MCNC: 9.2 MG/DL — SIGNIFICANT CHANGE UP (ref 8.4–10.5)
CHLORIDE SERPL-SCNC: 112 MMOL/L — HIGH (ref 98–107)
CO2 SERPL-SCNC: 23 MMOL/L — SIGNIFICANT CHANGE UP (ref 22–31)
CREAT SERPL-MCNC: 1.58 MG/DL — HIGH (ref 0.5–1.3)
EOSINOPHIL # BLD AUTO: 0.02 K/UL — SIGNIFICANT CHANGE UP (ref 0–0.5)
EOSINOPHIL NFR BLD AUTO: 0.3 % — SIGNIFICANT CHANGE UP (ref 0–6)
GLUCOSE SERPL-MCNC: 146 MG/DL — HIGH (ref 70–99)
HCT VFR BLD CALC: 37.7 % — LOW (ref 39–50)
HCT VFR BLD CALC: 38.6 % — LOW (ref 39–50)
HGB BLD-MCNC: 12.3 G/DL — LOW (ref 13–17)
HGB BLD-MCNC: 12.3 G/DL — LOW (ref 13–17)
IMM GRANULOCYTES # BLD AUTO: 0.02 # — SIGNIFICANT CHANGE UP
IMM GRANULOCYTES NFR BLD AUTO: 0.3 % — SIGNIFICANT CHANGE UP (ref 0–1.5)
LYMPHOCYTES # BLD AUTO: 0.95 K/UL — LOW (ref 1–3.3)
LYMPHOCYTES # BLD AUTO: 14.3 % — SIGNIFICANT CHANGE UP (ref 13–44)
MCHC RBC-ENTMCNC: 31.4 PG — SIGNIFICANT CHANGE UP (ref 27–34)
MCHC RBC-ENTMCNC: 31.9 % — LOW (ref 32–36)
MCHC RBC-ENTMCNC: 31.9 PG — SIGNIFICANT CHANGE UP (ref 27–34)
MCHC RBC-ENTMCNC: 32.6 % — SIGNIFICANT CHANGE UP (ref 32–36)
MCV RBC AUTO: 97.9 FL — SIGNIFICANT CHANGE UP (ref 80–100)
MCV RBC AUTO: 98.5 FL — SIGNIFICANT CHANGE UP (ref 80–100)
MONOCYTES # BLD AUTO: 0.62 K/UL — SIGNIFICANT CHANGE UP (ref 0–0.9)
MONOCYTES NFR BLD AUTO: 9.4 % — SIGNIFICANT CHANGE UP (ref 2–14)
NEUTROPHILS # BLD AUTO: 5 K/UL — SIGNIFICANT CHANGE UP (ref 1.8–7.4)
NEUTROPHILS NFR BLD AUTO: 75.4 % — SIGNIFICANT CHANGE UP (ref 43–77)
NRBC # FLD: 0 — SIGNIFICANT CHANGE UP
NRBC # FLD: 0 — SIGNIFICANT CHANGE UP
PLATELET # BLD AUTO: 221 K/UL — SIGNIFICANT CHANGE UP (ref 150–400)
PLATELET # BLD AUTO: 240 K/UL — SIGNIFICANT CHANGE UP (ref 150–400)
PMV BLD: 10.1 FL — SIGNIFICANT CHANGE UP (ref 7–13)
PMV BLD: 9.6 FL — SIGNIFICANT CHANGE UP (ref 7–13)
POTASSIUM SERPL-MCNC: 4.2 MMOL/L — SIGNIFICANT CHANGE UP (ref 3.5–5.3)
POTASSIUM SERPL-SCNC: 4.2 MMOL/L — SIGNIFICANT CHANGE UP (ref 3.5–5.3)
RBC # BLD: 3.85 M/UL — LOW (ref 4.2–5.8)
RBC # BLD: 3.92 M/UL — LOW (ref 4.2–5.8)
RBC # FLD: 13.4 % — SIGNIFICANT CHANGE UP (ref 10.3–14.5)
RBC # FLD: 13.5 % — SIGNIFICANT CHANGE UP (ref 10.3–14.5)
SODIUM SERPL-SCNC: 148 MMOL/L — HIGH (ref 135–145)
WBC # BLD: 6.63 K/UL — SIGNIFICANT CHANGE UP (ref 3.8–10.5)
WBC # BLD: 6.84 K/UL — SIGNIFICANT CHANGE UP (ref 3.8–10.5)
WBC # FLD AUTO: 6.63 K/UL — SIGNIFICANT CHANGE UP (ref 3.8–10.5)
WBC # FLD AUTO: 6.84 K/UL — SIGNIFICANT CHANGE UP (ref 3.8–10.5)

## 2018-04-08 RX ADMIN — POLYETHYLENE GLYCOL 3350 17 GRAM(S): 17 POWDER, FOR SOLUTION ORAL at 05:23

## 2018-04-08 RX ADMIN — Medication 1 APPLICATION(S): at 05:22

## 2018-04-08 RX ADMIN — Medication 120 MILLIGRAM(S): at 05:23

## 2018-04-08 RX ADMIN — SENNA PLUS 2 TABLET(S): 8.6 TABLET ORAL at 22:50

## 2018-04-08 RX ADMIN — PANTOPRAZOLE SODIUM 10 MG/HR: 20 TABLET, DELAYED RELEASE ORAL at 22:50

## 2018-04-08 RX ADMIN — Medication 1 APPLICATION(S): at 17:11

## 2018-04-08 RX ADMIN — Medication 100 MILLIGRAM(S): at 17:11

## 2018-04-08 RX ADMIN — Medication 100 MILLIGRAM(S): at 05:23

## 2018-04-08 RX ADMIN — FINASTERIDE 5 MILLIGRAM(S): 5 TABLET, FILM COATED ORAL at 12:43

## 2018-04-08 RX ADMIN — Medication 100 MICROGRAM(S): at 05:23

## 2018-04-09 LAB
BUN SERPL-MCNC: 28 MG/DL — HIGH (ref 7–23)
CALCIUM SERPL-MCNC: 9 MG/DL — SIGNIFICANT CHANGE UP (ref 8.4–10.5)
CHLORIDE SERPL-SCNC: 117 MMOL/L — HIGH (ref 98–107)
CO2 SERPL-SCNC: 24 MMOL/L — SIGNIFICANT CHANGE UP (ref 22–31)
CREAT SERPL-MCNC: 1.48 MG/DL — HIGH (ref 0.5–1.3)
GLUCOSE SERPL-MCNC: 139 MG/DL — HIGH (ref 70–99)
HCT VFR BLD CALC: 38 % — LOW (ref 39–50)
HGB BLD-MCNC: 12.1 G/DL — LOW (ref 13–17)
MCHC RBC-ENTMCNC: 31.8 % — LOW (ref 32–36)
MCHC RBC-ENTMCNC: 31.8 PG — SIGNIFICANT CHANGE UP (ref 27–34)
MCV RBC AUTO: 99.7 FL — SIGNIFICANT CHANGE UP (ref 80–100)
NRBC # FLD: 0 — SIGNIFICANT CHANGE UP
PLATELET # BLD AUTO: 227 K/UL — SIGNIFICANT CHANGE UP (ref 150–400)
PMV BLD: 10.1 FL — SIGNIFICANT CHANGE UP (ref 7–13)
POTASSIUM SERPL-MCNC: 3.8 MMOL/L — SIGNIFICANT CHANGE UP (ref 3.5–5.3)
POTASSIUM SERPL-SCNC: 3.8 MMOL/L — SIGNIFICANT CHANGE UP (ref 3.5–5.3)
RBC # BLD: 3.81 M/UL — LOW (ref 4.2–5.8)
RBC # FLD: 13.8 % — SIGNIFICANT CHANGE UP (ref 10.3–14.5)
SODIUM SERPL-SCNC: 153 MMOL/L — HIGH (ref 135–145)
WBC # BLD: 6.62 K/UL — SIGNIFICANT CHANGE UP (ref 3.8–10.5)
WBC # FLD AUTO: 6.62 K/UL — SIGNIFICANT CHANGE UP (ref 3.8–10.5)

## 2018-04-09 RX ORDER — DEXTROSE MONOHYDRATE, SODIUM CHLORIDE, AND POTASSIUM CHLORIDE 50; .745; 4.5 G/1000ML; G/1000ML; G/1000ML
1000 INJECTION, SOLUTION INTRAVENOUS
Qty: 0 | Refills: 0 | Status: DISCONTINUED | OUTPATIENT
Start: 2018-04-09 | End: 2018-04-10

## 2018-04-09 RX ADMIN — Medication 1 APPLICATION(S): at 06:49

## 2018-04-09 RX ADMIN — Medication 100 MILLIGRAM(S): at 06:49

## 2018-04-09 RX ADMIN — Medication 100 MICROGRAM(S): at 06:49

## 2018-04-09 RX ADMIN — Medication 1 APPLICATION(S): at 20:01

## 2018-04-09 RX ADMIN — PANTOPRAZOLE SODIUM 10 MG/HR: 20 TABLET, DELAYED RELEASE ORAL at 06:49

## 2018-04-09 RX ADMIN — DEXTROSE MONOHYDRATE, SODIUM CHLORIDE, AND POTASSIUM CHLORIDE 75 MILLILITER(S): 50; .745; 4.5 INJECTION, SOLUTION INTRAVENOUS at 20:01

## 2018-04-09 RX ADMIN — PANTOPRAZOLE SODIUM 10 MG/HR: 20 TABLET, DELAYED RELEASE ORAL at 17:39

## 2018-04-09 RX ADMIN — POLYETHYLENE GLYCOL 3350 17 GRAM(S): 17 POWDER, FOR SOLUTION ORAL at 06:49

## 2018-04-09 RX ADMIN — Medication 100 MILLIGRAM(S): at 19:34

## 2018-04-09 RX ADMIN — Medication 120 MILLIGRAM(S): at 06:49

## 2018-04-09 RX ADMIN — FINASTERIDE 5 MILLIGRAM(S): 5 TABLET, FILM COATED ORAL at 13:35

## 2018-04-10 LAB
BACTERIA BLD CULT: SIGNIFICANT CHANGE UP
BACTERIA BLD CULT: SIGNIFICANT CHANGE UP
BUN SERPL-MCNC: 24 MG/DL — HIGH (ref 7–23)
CALCIUM SERPL-MCNC: 7.7 MG/DL — LOW (ref 8.4–10.5)
CHLORIDE SERPL-SCNC: 104 MMOL/L — SIGNIFICANT CHANGE UP (ref 98–107)
CO2 SERPL-SCNC: 23 MMOL/L — SIGNIFICANT CHANGE UP (ref 22–31)
CREAT SERPL-MCNC: 1.28 MG/DL — SIGNIFICANT CHANGE UP (ref 0.5–1.3)
GLUCOSE BLDC GLUCOMTR-MCNC: 101 MG/DL — HIGH (ref 70–99)
GLUCOSE SERPL-MCNC: 524 MG/DL — CRITICAL HIGH (ref 70–99)
HCT VFR BLD CALC: 31.4 % — LOW (ref 39–50)
HGB BLD-MCNC: 9.7 G/DL — LOW (ref 13–17)
MCHC RBC-ENTMCNC: 30.9 % — LOW (ref 32–36)
MCHC RBC-ENTMCNC: 31.6 PG — SIGNIFICANT CHANGE UP (ref 27–34)
MCV RBC AUTO: 102.3 FL — HIGH (ref 80–100)
NRBC # FLD: 0 — SIGNIFICANT CHANGE UP
PLATELET # BLD AUTO: 184 K/UL — SIGNIFICANT CHANGE UP (ref 150–400)
PMV BLD: 10.3 FL — SIGNIFICANT CHANGE UP (ref 7–13)
POTASSIUM SERPL-MCNC: 5.7 MMOL/L — HIGH (ref 3.5–5.3)
POTASSIUM SERPL-SCNC: 5.7 MMOL/L — HIGH (ref 3.5–5.3)
RBC # BLD: 3.07 M/UL — LOW (ref 4.2–5.8)
RBC # FLD: 13.9 % — SIGNIFICANT CHANGE UP (ref 10.3–14.5)
SODIUM SERPL-SCNC: 135 MMOL/L — SIGNIFICANT CHANGE UP (ref 135–145)
WBC # BLD: 7.41 K/UL — SIGNIFICANT CHANGE UP (ref 3.8–10.5)
WBC # FLD AUTO: 7.41 K/UL — SIGNIFICANT CHANGE UP (ref 3.8–10.5)

## 2018-04-10 PROCEDURE — 93306 TTE W/DOPPLER COMPLETE: CPT | Mod: 26

## 2018-04-10 PROCEDURE — 71045 X-RAY EXAM CHEST 1 VIEW: CPT | Mod: 26

## 2018-04-10 RX ORDER — DEXTROSE 50 % IN WATER 50 %
12.5 SYRINGE (ML) INTRAVENOUS ONCE
Qty: 0 | Refills: 0 | Status: DISCONTINUED | OUTPATIENT
Start: 2018-04-10 | End: 2018-04-14

## 2018-04-10 RX ORDER — DEXTROSE 50 % IN WATER 50 %
25 SYRINGE (ML) INTRAVENOUS ONCE
Qty: 0 | Refills: 0 | Status: DISCONTINUED | OUTPATIENT
Start: 2018-04-10 | End: 2018-04-14

## 2018-04-10 RX ORDER — SODIUM CHLORIDE 9 MG/ML
1000 INJECTION, SOLUTION INTRAVENOUS
Qty: 0 | Refills: 0 | Status: DISCONTINUED | OUTPATIENT
Start: 2018-04-10 | End: 2018-04-14

## 2018-04-10 RX ORDER — INSULIN LISPRO 100/ML
VIAL (ML) SUBCUTANEOUS
Qty: 0 | Refills: 0 | Status: DISCONTINUED | OUTPATIENT
Start: 2018-04-10 | End: 2018-04-10

## 2018-04-10 RX ORDER — SODIUM POLYSTYRENE SULFONATE 4.1 MEQ/G
15 POWDER, FOR SUSPENSION ORAL ONCE
Qty: 0 | Refills: 0 | Status: COMPLETED | OUTPATIENT
Start: 2018-04-10 | End: 2018-04-10

## 2018-04-10 RX ORDER — GLUCAGON INJECTION, SOLUTION 0.5 MG/.1ML
1 INJECTION, SOLUTION SUBCUTANEOUS ONCE
Qty: 0 | Refills: 0 | Status: DISCONTINUED | OUTPATIENT
Start: 2018-04-10 | End: 2018-04-14

## 2018-04-10 RX ORDER — DEXTROSE 50 % IN WATER 50 %
1 SYRINGE (ML) INTRAVENOUS ONCE
Qty: 0 | Refills: 0 | Status: DISCONTINUED | OUTPATIENT
Start: 2018-04-10 | End: 2018-04-14

## 2018-04-10 RX ADMIN — PANTOPRAZOLE SODIUM 10 MG/HR: 20 TABLET, DELAYED RELEASE ORAL at 07:04

## 2018-04-10 RX ADMIN — Medication 1 APPLICATION(S): at 07:04

## 2018-04-10 RX ADMIN — SODIUM POLYSTYRENE SULFONATE 15 GRAM(S): 4.1 POWDER, FOR SUSPENSION ORAL at 12:16

## 2018-04-10 NOTE — SWALLOW BEDSIDE ASSESSMENT ADULT - SWALLOW EVAL: CURRENT DIET
NPO as of 4/9/18, was previously on puree and honey thickened liquids
clear liquids -cleared for speech and swallow evaluation

## 2018-04-10 NOTE — SWALLOW BEDSIDE ASSESSMENT ADULT - ASR SWALLOW ASPIRATION MONITOR
fever/pneumonia/throat clearing/upper respiratory infection/change of breathing pattern/position upright (89Y)/oral hygiene/cough/gurgly voice
fever/upper respiratory infection/position upright (89Y)/throat clearing/change of breathing pattern/cough/gurgly voice/pneumonia

## 2018-04-10 NOTE — SWALLOW BEDSIDE ASSESSMENT ADULT - SWALLOW EVAL: RECOMMENDED DIET
1. oral intake is contraindicated at this time. 2. Initiate short term non-oral means of nutrition/hydration
Initiate dysphagia 1 and honey thickened liquids

## 2018-04-10 NOTE — SWALLOW BEDSIDE ASSESSMENT ADULT - SWALLOW EVAL: DIAGNOSIS
1. Patient presented with mild-moderate oral stage dyspahgia for puree marked by increased bolus mastication, formation, and delayed anterior-posterior movement and delayed oral transit.  Patient with adequate oral clearance from oral cavity.  2. Patient with severe pharyngeal stage dysphagia for puree marked by absent swallow trigger reustling in increased congestion and wet vocal quality at baseline with eventual coughing.  Cough was productive after a few attempts and secretions/puree was removed from oral cavity with Yankauer by caregiver Ora.  3. At this time oral intake is contraindicated as patient demonstrated an absent swallow trigger.  A 1. Patient presented with mild-moderate oral stage dysphagia for puree marked by increased bolus mastication, formation, and delayed anterior-posterior movement and delayed oral transit.  Patient with adequate oral clearance from oral cavity.  2. Patient with severe pharyngeal stage dysphagia for puree marked by absent swallow trigger reustling in increased congestion and wet vocal quality at baseline with eventual coughing.  Cough was productive after a few attempts and secretions/puree was removed from oral cavity with Yankauer by caregiver Ora.  3. At this time oral intake is contraindicated as patient demonstrated an absent swallow trigger.

## 2018-04-10 NOTE — PROGRESS NOTE ADULT - PROBLEM SELECTOR PLAN 3
worsen  on d5w @ 75cc/hr x 1 day  monitor fluid status, currently euvolemic worsen  on d5w @ 75cc/hr x 1 day  monitor fluid status, currently euvolemic  Monitor Na level

## 2018-04-10 NOTE — SWALLOW BEDSIDE ASSESSMENT ADULT - NS SPL SWALLOW CLINIC TRIAL FT
Solids were not trialed due to patients confusion and distraction.  At this time solids are not appropriate and patient is at high risk fo choking due to his distraction and confusion.
Patient with absent swallow trigger upon digital palpation resulting in eventual cough and increased congestion with breathing.  Patient cued to cough and eventually coughed up secretions/puree which was suctioned from mouth by caregiver Ora via Josette.

## 2018-04-10 NOTE — SWALLOW BEDSIDE ASSESSMENT ADULT - SLP PERTINENT HISTORY OF CURRENT PROBLEM
89y Male DNR/DNI , +PPM, S/P IVC Filter - h/o atrial fibrillation on Xarelto, and prior UGIB (1990s, treated with coil embolization - as described by patient's healthcare proxy), BPH, CKD stage 3, Hypothyroid, Dementia - brought in by ambulance after:.
89y Male DNR/DNI , +PPM, S/P IVC Filter - h/o atrial fibrillation on Xarelto, and prior UGIB (1990s, treated with coil embolization - as described by patient's healthcare proxy), BPH, CKD stage 3, Hypothyroid, Dementia - brought in by ambulance after large episode of coffee-ground emesis.

## 2018-04-10 NOTE — SWALLOW BEDSIDE ASSESSMENT ADULT - ADDITIONAL RECOMMENDATIONS
1. Registered dietician consult to ensure patient maintains their nutritional/hydration/caloric needs via non-oral diet.   2. Recommend Pulmonology consult secondary to increased congestion noted at baseline and severe coughing of secretions requiring suctioning via Yankauer. 1. Registered dietician consult to ensure patient maintains their nutritional/hydration/caloric needs via non-oral diet.   2. Recommend Pulmonology consult secondary to increased congestion noted at baseline and severe coughing of secretions requiring suctioning via Yankauer.  3. MD to reconsult this department once chest congestion is cleared up to reassess for possible oral diet.

## 2018-04-10 NOTE — SWALLOW BEDSIDE ASSESSMENT ADULT - SWALLOW EVAL: SECRETION MANAGEMENT
adequate, no coughing/throat clearing/wet vocal quality at baseline
baseline cough/wet upper airway/breath sounds

## 2018-04-10 NOTE — SWALLOW BEDSIDE ASSESSMENT ADULT - COMMENTS
Per nursing and  at bedside, patient with coughing when drinking thin liquids and sometimes nectar thickened liquids.  He was recently downgraded to nectar when nursing noted coughing on thin liquids.  Patient was pleasantly confused and easily distracted which impeded on his swallowing ability.  Patient was able to follow directions with maximum cues.  Patient seen upright in bed, awake and cooperative.    CXR: (04.04.18 @ 15:43) Slightly underinflated but otherwise clear lungs. No pleural effusions or pneumothorax.  Stable left chest wall dual-lead pacemaker and cardiac and mediastinal   silhouettes including a tortuous calcified aorta.
Patient seen by this department and previously placed on puree/honey thickened liquids on 4/7/2018.  See documents for full findings of previous bedside swallowing evaluation.  Patient's home health aids were present at bedside.  Patient's home health aids reported that patient with increased congestion and coughing however they did not feel it was always when eating.  Patient noted with congestion/wet vocal quality at baseline.  Most recent chest xray 4/4/18 - clear lungs.

## 2018-04-11 DIAGNOSIS — Z51.5 ENCOUNTER FOR PALLIATIVE CARE: ICD-10-CM

## 2018-04-11 DIAGNOSIS — R05 COUGH: ICD-10-CM

## 2018-04-11 LAB
BUN SERPL-MCNC: 28 MG/DL — HIGH (ref 7–23)
CALCIUM SERPL-MCNC: 8.9 MG/DL — SIGNIFICANT CHANGE UP (ref 8.4–10.5)
CHLORIDE SERPL-SCNC: 116 MMOL/L — HIGH (ref 98–107)
CO2 SERPL-SCNC: 23 MMOL/L — SIGNIFICANT CHANGE UP (ref 22–31)
CREAT SERPL-MCNC: 1.4 MG/DL — HIGH (ref 0.5–1.3)
GLUCOSE BLDC GLUCOMTR-MCNC: 105 MG/DL — HIGH (ref 70–99)
GLUCOSE BLDC GLUCOMTR-MCNC: 82 MG/DL — SIGNIFICANT CHANGE UP (ref 70–99)
GLUCOSE BLDC GLUCOMTR-MCNC: 83 MG/DL — SIGNIFICANT CHANGE UP (ref 70–99)
GLUCOSE SERPL-MCNC: 86 MG/DL — SIGNIFICANT CHANGE UP (ref 70–99)
HBA1C BLD-MCNC: 5.4 % — SIGNIFICANT CHANGE UP (ref 4–5.6)
HCT VFR BLD CALC: 35 % — LOW (ref 39–50)
HGB BLD-MCNC: 11.4 G/DL — LOW (ref 13–17)
MCHC RBC-ENTMCNC: 32.4 PG — SIGNIFICANT CHANGE UP (ref 27–34)
MCHC RBC-ENTMCNC: 32.6 % — SIGNIFICANT CHANGE UP (ref 32–36)
MCV RBC AUTO: 99.4 FL — SIGNIFICANT CHANGE UP (ref 80–100)
NRBC # FLD: 0 — SIGNIFICANT CHANGE UP
PLATELET # BLD AUTO: 194 K/UL — SIGNIFICANT CHANGE UP (ref 150–400)
PMV BLD: 10.3 FL — SIGNIFICANT CHANGE UP (ref 7–13)
POTASSIUM SERPL-MCNC: 3.7 MMOL/L — SIGNIFICANT CHANGE UP (ref 3.5–5.3)
POTASSIUM SERPL-SCNC: 3.7 MMOL/L — SIGNIFICANT CHANGE UP (ref 3.5–5.3)
RBC # BLD: 3.52 M/UL — LOW (ref 4.2–5.8)
RBC # FLD: 14 % — SIGNIFICANT CHANGE UP (ref 10.3–14.5)
SODIUM SERPL-SCNC: 152 MMOL/L — HIGH (ref 135–145)
WBC # BLD: 8.07 K/UL — SIGNIFICANT CHANGE UP (ref 3.8–10.5)
WBC # FLD AUTO: 8.07 K/UL — SIGNIFICANT CHANGE UP (ref 3.8–10.5)

## 2018-04-11 PROCEDURE — 99223 1ST HOSP IP/OBS HIGH 75: CPT

## 2018-04-11 RX ORDER — IPRATROPIUM/ALBUTEROL SULFATE 18-103MCG
3 AEROSOL WITH ADAPTER (GRAM) INHALATION EVERY 6 HOURS
Qty: 0 | Refills: 0 | Status: DISCONTINUED | OUTPATIENT
Start: 2018-04-11 | End: 2018-04-14

## 2018-04-11 RX ORDER — SODIUM CHLORIDE 9 MG/ML
1000 INJECTION, SOLUTION INTRAVENOUS
Qty: 0 | Refills: 0 | Status: DISCONTINUED | OUTPATIENT
Start: 2018-04-11 | End: 2018-04-12

## 2018-04-11 RX ADMIN — Medication 1 APPLICATION(S): at 08:46

## 2018-04-11 RX ADMIN — PANTOPRAZOLE SODIUM 10 MG/HR: 20 TABLET, DELAYED RELEASE ORAL at 22:50

## 2018-04-11 RX ADMIN — SODIUM CHLORIDE 75 MILLILITER(S): 9 INJECTION, SOLUTION INTRAVENOUS at 22:50

## 2018-04-11 RX ADMIN — Medication 3 MILLILITER(S): at 15:37

## 2018-04-11 RX ADMIN — SODIUM CHLORIDE 75 MILLILITER(S): 9 INJECTION, SOLUTION INTRAVENOUS at 13:04

## 2018-04-11 RX ADMIN — Medication 3 MILLILITER(S): at 21:02

## 2018-04-11 RX ADMIN — Medication 1 APPLICATION(S): at 17:38

## 2018-04-11 NOTE — DISCHARGE NOTE ADULT - CARE PLAN
Principal Discharge DX:	Upper GI bleed  Goal:	resolved  Assessment and plan of treatment:	Resolved.  Monitor for further bleeding.  Secondary Diagnosis:	Dementia  Assessment and plan of treatment:	Supportive care.  Secondary Diagnosis:	Congestive heart failure  Secondary Diagnosis:	CKD (chronic kidney disease) stage 3, GFR 30-59 ml/min  Secondary Diagnosis:	Atrial fibrillation, chronic  Assessment and plan of treatment:	Xarelto held indefinitely due to high risk of bleeding.  Continue follow up with cardiologist. Principal Discharge DX:	Upper GI bleed  Goal:	resolved  Assessment and plan of treatment:	Resolved. H/H and blood counts stable.  Monitor for further bleeding.  Continue protonix - PPI to coat stomach.  Secondary Diagnosis:	Dementia  Assessment and plan of treatment:	Supportive care.  Continue with chest PT to help mobilize secretions.   May also continue with nebulizer treatments as needed for secretions as well.  Upright when eating.  Secondary Diagnosis:	Congestive heart failure  Assessment and plan of treatment:	Continue medications as directed.  No lasix necessary at this time - please follow up with your cardiologist within 2-3 weeks for further treatment and management.  Secondary Diagnosis:	CKD (chronic kidney disease) stage 3, GFR 30-59 ml/min  Assessment and plan of treatment:	Stable at this time.  Secondary Diagnosis:	Atrial fibrillation, chronic  Assessment and plan of treatment:	Xarelto held indefinitely due to high risk of bleeding.  Continue follow up with cardiologist. Principal Discharge DX:	Upper GI bleed  Goal:	resolved  Assessment and plan of treatment:	Resolved. H/H and blood counts stable.  Monitor for further bleeding.  Continue protonix - PPI to coat stomach.  Please follow up with your pcp Dr Robin within 1 week of discharge.  Please call to make an appointment [phone]  Secondary Diagnosis:	Dementia  Goal:	continue current regimen  Assessment and plan of treatment:	Supportive care.  Continue with chest PT to help mobilize secretions.   May also continue with nebulizer treatments as needed for secretions as well.  Upright when eating.Please follow up with your pcp Dr Robin within 1 week of discharge.  Please call to make an appointment [phone]  Secondary Diagnosis:	Congestive heart failure  Goal:	remain stable  Assessment and plan of treatment:	Continue medications as directed.  No lasix necessary at this time - please follow up with your cardiologist Dr. Rodriguez [id number] 2-3 weeks for further treatment and management.  Secondary Diagnosis:	CKD (chronic kidney disease) stage 3, GFR 30-59 ml/min  Goal:	remain stable  Assessment and plan of treatment:	Stable at this time.Please follow up with your pcp Dr Robin within 1 week of discharge.  Please call to make an appointment [phone]  Secondary Diagnosis:	Atrial fibrillation, chronic  Assessment and plan of treatment:	Xarelto held indefinitely due to high risk of bleeding.  Continue follow up with cardiologist. please follow up with your cardiologist Dr. Rodriguez [id number] 2-3 weeks for further treatment and management.

## 2018-04-11 NOTE — DISCHARGE NOTE ADULT - PROVIDER TOKENS
FREE:[LAST:[cardiologist],PHONE:[(   )    -],FAX:[(   )    -],ADDRESS:[Dr. Freddie Rodriguez [phone]]]

## 2018-04-11 NOTE — DISCHARGE NOTE ADULT - MEDICATION SUMMARY - MEDICATIONS TO TAKE
I will START or STAY ON the medications listed below when I get home from the hospital:    finasteride 5 mg oral tablet  -- 1 tab(s) by mouth once a day  -- Indication: For BPH (benign prostatic hyperplasia)    Tylenol 325 mg oral tablet  -- 2 tab(s) by mouth every 4 hours, As Needed  -- Indication: For Pain    verapamil 120 mg/24 hours oral capsule, extended release  -- 1 cap(s) by mouth once a day  -- Indication: For Hypertension    ipratropium-albuterol 0.5 mg-2.5 mg/3 mLinhalation solution  -- 3 milliliter(s) inhaled every 6 hours  -- Indication: For Dementia    ammonium lactate 12% topical lotion  -- 1 application on skin 2 times a day  -- Indication: For Dry Skin    ferrous sulfate 325 mg (65 mg elemental iron) oral tablet  -- 1 tab(s) by mouth once a day  -- Indication: For Vitamins    docusate sodium 100 mg oral tablet  -- 1 tab(s) by mouth 2 times a day, As Needed  -- Indication: For Constipation    polyethylene glycol 3350 oral powder for reconstitution  -- 17 gram(s) by mouth 2 times a day  -- Indication: For Constipation    Senna 8.6 mg oral tablet  -- 1 tab(s) by mouth once a day (at bedtime), As Needed  -- Indication: For Constipation    Protonix 40 mg oral delayed release tablet  -- 1 tab(s) by mouth once a day   -- It is very important that you take or use this exactly as directed.  Do not skip doses or discontinue unless directed by your doctor.  Obtain medical advice before taking any non-prescription drugs as some may affect the action of this medication.  Swallow whole.  Do not crush.    -- Indication: For GI bleed    levothyroxine 100 mcg (0.1 mg) oral tablet  -- 1 tab(s) by mouth once a day  -- Indication: For Hypothyroid

## 2018-04-11 NOTE — CONSULT NOTE ADULT - SUBJECTIVE AND OBJECTIVE BOX
HPI:  Patient is a 89y Male DNR/DNI , +PPM, S/P IVC Filter - h/o atrial fibrillation on Xarelto, and prior UGIB (1990s, treated with coil embolization - as described by patient's healthcare proxy), BPH, CKD stage 3, Hypothyroid, Dementia - brought in by ambulance after large episode of coffee-ground emesis. Per patient's aides (who lives with the patient and attend to him daily), he had an episode of NBNB emesis the morning prior and then tolerated his evening meal. Then, this AM, he had this episode of "black vomit." In addition, his aides have noted that over the past day or so his mental status has been depressed. At baseline, the patient is demented (disoriented) but typically alert and interactive. He ambulates short distances within the home with assistance. However, for the past day he has been more lethargic and less interactive. He has not complained of any pain. No fever or chills noted by his aides. He is incontinent of urine and stool, and his aides report that he has been regularly urinating and passing soft, formed, brown stool (last BM yesterday). Pt uses walker to ambulate, no HX of Dysphagia, no fever, + RICARDO and signs of dehydration as per Labs noted. pt was started on Protonix Drip, NPO, IVF LR for now x 12 hours pt is on PO Lasix at home. Had CT abd/pelvis  no contrast  small Ascites, nonobstructive stone in Rt renal pelvis, mildly dilated loops of small  bowel, in LLQ, enlarged prostate, Atrophic B/L kidneys, Occult Blood +, Pt was seen by Surgery  NO intervention as per surgery Note.    Of note, all of the patient's healthcare is typically managed at OSH ([facility]).   PCP: Slim Lovelace - [phone]  HCP: Freddie Mares- [phone]  Charlette NAZARIO with [facility]: [phone]  GI: Dr. Robin- [phone]    Labs: UA: Large Blood, Na 143, K+ 5.3, BUN 54, Creatinine 2.05, glucose 144, LFT Normal, WBC 7.72, Hgb 13.3, Platelet 239, PT 11.5, PTT 30.3, INR 1.00, Occult Blood +, Lactate 2.4     Vitals: Tem 97.3, HR 72, /80, RR 18, 97% RA (04 Apr 2018 20:22)      PERTINENT PM/SXH:   S/P IVC filter  Pacemaker  BPH (benign prostatic hyperplasia)  GI bleed  Atrial fibrillation, chronic  Hypothyroid  CKD (chronic kidney disease) stage 3, GFR 30-59 ml/min  Congestive heart failure  Dementia  No pertinent past medical history    S/P IVC filter  Artificial cardiac pacemaker  History of hip replacement, total, left    SOCIAL HISTORY:   Significant other/partner:  [ ] YES  [x ] NO               Children:  [ ] YES  [x ] NO                   Cheondoism/Spirituality:  Substance hx:  [ ] YES   [x ] NO                   Tobacco hx:  [ ] YES  [x ] NO                       Alcohol hx: [ ] YES  [x ] NO         Home Opioid hx:  [ ] YES  [ ] NO   Living Situation: [ x] Home with 24h HHA  [ ] Long term care  [ ] Rehab [ ] Other    FAMILY HISTORY:  No pertinent family history in first degree relatives    [ ] Family history non-contributory     BASELINE (I)ADLs (prior to admission):  [place]: [ ] total  [ ] moderate [ x] dependent    ADVANCE DIRECTIVES:    DNR Yes    MOLST  [ ] YES [ ] NO                      [ ] Completed  Health Care Proxy [ x] YES  [ ] NO   [ ] Completed  Living Will  [ ] YES [ ] NO             [ ] Surrogate  [ ] HCP  [ ] Guardian:                                                                  Phone#:    Allergies    penicillin (Hives)  penicillins (Unknown)    Intolerances        MEDICATIONS  (STANDING):  ALBUTerol/ipratropium for Nebulization 3 milliLiter(s) Nebulizer every 6 hours  ammonium lactate 12% Lotion 1 Application(s) Topical two times a day  dextrose 5% + sodium chloride 0.45%. 1000 milliLiter(s) (75 mL/Hr) IV Continuous <Continuous>  dextrose 5%. 1000 milliLiter(s) (50 mL/Hr) IV Continuous <Continuous>  dextrose 50% Injectable 12.5 Gram(s) IV Push once  dextrose 50% Injectable 25 Gram(s) IV Push once  dextrose 50% Injectable 25 Gram(s) IV Push once  docusate sodium Liquid 100 milliGRAM(s) Oral two times a day  finasteride 5 milliGRAM(s) Oral daily  levothyroxine 100 MICROGram(s) Oral daily  pantoprazole Infusion 8 mG/Hr (10 mL/Hr) IV Continuous <Continuous>  polyethylene glycol 3350 17 Gram(s) Oral two times a day  senna 2 Tablet(s) Oral at bedtime  verapamil  milliGRAM(s) Oral daily    MEDICATIONS  (PRN):  dextrose Gel 1 Dose(s) Oral once PRN Blood Glucose LESS THAN 70 milliGRAM(s)/deciliter  glucagon  Injectable 1 milliGRAM(s) IntraMuscular once PRN Glucose LESS THAN 70 milligrams/deciliter      PRESENT SYMPTOMS:  Source: [ ] Patient   [ ] Family   [ ] Team     Pain:                        [ ] No [ ] Yes             [ ] Mild [ ] Moderate [ ] Severe    Onset -  Location -  Duration -  Character -  Alleviating/Aggravating -  Radiation -  Timing -      Dyspnea:                [x ] No [ ] Yes             [ ] Mild [ ] Moderate [ ] Severe    Anxiety:                  [ x] No [ ] Yes             [ ] Mild [ ] Moderate [ ] Severe    Fatigue:                  [ ] No [x ] Yes             [ ] Mild [ ] Moderate [ ] Severe    Nausea:                  [x ] No [ ] Yes             [ ] Mild [ ] Moderate [ ] Severe    Loss of appetite:   [ ] No [x ] Yes             [ ] Mild [ ] Moderate [ ] Severe    Constipation:        [ ] No [x ] Yes             [ x] Mild [ ] Moderate [ ] Severe    Other Symptoms:  [ ] All other review of systems negative   [x ] Unable to obtain due to poor mentation     Karnofsky Performance Score/Palliative Performance Status Version 2:        40 %    PHYSICAL EXAM:  Vital Signs Last 24 Hrs  T(C): 36.9 (11 Apr 2018 10:40), Max: 37.2 (11 Apr 2018 07:45)  T(F): 98.5 (11 Apr 2018 10:40), Max: 99 (11 Apr 2018 07:45)  HR: 74 (11 Apr 2018 14:16) (70 - 76)  BP: 134/77 (11 Apr 2018 14:16) (129/79 - 135/76)  BP(mean): --  RR: 18 (11 Apr 2018 14:16) (18 - 18)  SpO2: 96% (11 Apr 2018 14:16) (94% - 97%) I&O's Summary      General:  [ x] Alert  [ ] Oriented x 0     [ ] Lethargic  [ ] Agitated   [ ] Cachexia   [ ] Unarousable  [ ] Verbal  [ ] Non-Verbal    HEENT:  [ x] Normal   [ ] Dry mouth   [ ] ET Tube    [ ] Trach  [ ] Oral lesions    Lungs:   [ ] Clear [ ] Tachypnea  [x ] Audible excessive secretions   [ ] Rhonchi        [ ] Right [ ] Left [ ] Bilateral  [ ] Crackles        [ ] Right [ ] Left [ ] Bilateral  [ ] Wheezing     [ ] Right [ ] Left [ ] Bilateral    Cardiovascular:  [x ] Regular [ ] Irregular [ ] Tachycardia   [ ] Bradycardia  [ ] Murmur [ ] Other    Abdomen: [x ] Soft  [ ] Distended   [x ] +BS  [ ] Non tender [ ] Tender  [ ]PEG   [ ]OGT/ NGT   Last BM:       Genitourinary: [ ] Normal [ ] Incontinent   [ ] Oliguria/Anuria   [ ] Silver    Musculoskeletal:  [ ] Normal   [x ] Weakness  [ ] Bedbound/Wheelchair bound [ ] Edema    Neurological: [ ] No focal deficits  [ x] Cognitive impairment  [ ] Dysphagia [ ] Dysarthria [ ] Paresis [ ] Other     Skin: [ ] Normal   [ ] Pressure ulcer(s)                  [ ] Rash    LABS:                        11.4   8.07  )-----------( 194      ( 11 Apr 2018 06:00 )             35.0     04-11    152<H>  |  116<H>  |  28<H>  ----------------------------<  86  3.7   |  23  |  1.40<H>    Ca    8.9      11 Apr 2018 06:00            Shock: [ ] Septic [ ] Cardiogenic [ ] Neurologic [ ] Hypovolemic  Vasopressors x   Inotrophs x     Protein Calorie Malnutrition: [ ] Mild [ ] Moderate [ ] Severe    Oral Intake: [ ] Unable/mouth care only [ ] Minimal [x ] Moderate [ ] Full Capability  Diet: [ ] NPO [ ] Tube feeds [ ] TPN [ ] Other     RADIOLOGY & ADDITIONAL STUDIES:    REFERRALS:   [ ] Chaplaincy  [ ] Hospice  [ ] Child Life  [ ] Social Work  [ ] Case management [ ] Holistic Therapy

## 2018-04-11 NOTE — CONSULT NOTE ADULT - PROBLEM SELECTOR RECOMMENDATION 2
Baseline Scr is unclear  Has h/o CKD  In view of improving renal function, will monitor at present
stable breathing
off anticoagulant for now because of GI bleeding.  c/w verapamil

## 2018-04-11 NOTE — DIETITIAN INITIAL EVALUATION ADULT. - PROBLEM SELECTOR PLAN 4
HX of A Fib, CHF, EF unknown, Hold Lasix for now due to RICARDO, Dehydration, GI Bleed,  IVF LR @ 75 cc/hr x 12 hours, F/U BMP, CBC,

## 2018-04-11 NOTE — DISCHARGE NOTE ADULT - HOSPITAL COURSE
89 y/o male with a PMHx of atrial fibrillation on Xarelto S/P PPM, history of UGIB, dementia, HTN, HLD, hypothyroidism and CKD presented to [facility] with coffee ground emesis. Pt was admitted. Hg remained stable and no transfusions were necessary. GI was consulted and they recommended conservative management without endoscopy. Pt was placed on PPI. Cardiology consulted and pt was taken off Xarelto. Pt failed dysphagia screen on 4/10. Pulm was consulted due to excessive secretions. Palliative was consulted. Pt was transferred to ADS service. 89 y/o male with a PMHx of atrial fibrillation on Xarelto S/P PPM, history of UGIB, dementia, HTN, HLD, hypothyroidism and CKD presented to [facility] with coffee ground emesis. Pt was admitted. Hg remained stable and no transfusions were necessary. GI was consulted and they recommended conservative management without endoscopy. Pt was placed on PPI. Cardiology consulted and pt was taken off Xarelto. Pt failed dysphagia screen on 4/10. Pulm was consulted due to excessive secretions. Palliative was consulted. Pt was transferred to ADS service.   On ADS - pt was ordered chest PT, duonebs to help mobilize secretions. HCP opted for pleasure feeds. Pt did well, secretions improved.  Hypernatremia improved.  PT recs rehab, HCP opts for home - pt has 24/7 HHA and care.  Optimized for discharge.    dispo: to home with home PT 89 y/o male with a PMHx of atrial fibrillation on Xarelto S/P PPM, history of UGIB, dementia, HTN, HLD, hypothyroidism and CKD presented to [facility] with coffee ground emesis. Pt was admitted. Hg remained stable and no transfusions were necessary. GI was consulted and they recommended conservative management without endoscopy. Pt was placed on PPI. Cardiology consulted and pt was taken off Xarelto. Pt failed dysphagia screen on 4/10. Pulm was consulted due to excessive secretions. Palliative was consulted. HCP  Freddie Mares [phone] wants pt to have pleasure feeding despite the risk of aspiration was explained to him. no PEG tube as per them.    Pt was transferred to ADS service.   On ADS - pt was ordered chest PT, duonebs to help mobilize secretions. HCP opted for pleasure feeds. Pt did well, secretions improved.  Hypernatremia improved.  PT recs rehab, HCP opts for home - pt has 24/7 HHA and care.  Optimized for discharge.    dispo: to home with home PT

## 2018-04-11 NOTE — DISCHARGE NOTE ADULT - CARE PROVIDER_API CALL
cardiologist,   Dr. Freddie Rodriguez [phone]  Phone: (   )    -  Fax: (   )    - cardiologist,   Dr. Freddie Rodriguez [phone]  Phone: (   )    -  Fax: (   )    -

## 2018-04-11 NOTE — DIETITIAN INITIAL EVALUATION ADULT. - NS AS NUTRI INTERV MEALS SNACK
Other (specify)/1. Suggest: Repeat Swallow Bedside Assessment adult &/or MBS if needed;          2. Suggest: Once & when clinically indicated resume PO diet; PO diet & fluid consistency per SLP recommendation;           3. If PO diet not appropriate to resume initiate alternative means of nutrition support according to Pt's wishes/GOC;             4. Monitor labs, weights, hydration status;

## 2018-04-11 NOTE — DIETITIAN INITIAL EVALUATION ADULT. - OTHER INFO
Pt seen for Length of stay. Pt 89 yo male appears alert, but did not participate in the interview. Pt’s aide & friend (@ bed side) answered questions during interview. Pt NPO @ time of visit. Of note Pt failed Swallow Bedside Assessment Adult; per SLP: Oral intake contraindicated at this time; to initiate short term non-oral means of nutrition/hydration (4/10). Per friend Pt was eating Regular food (PTA) with good appetite; Pt did not have any chewing/swallowing difficulties. PTA Pt vomited (coffee ground), but no vomiting/diarrhea reported @ present. Per friend Pt's UBW: ~176#; no weight loss or weight changes (PTA) voiced. DNR status noted. RDN remains available.

## 2018-04-11 NOTE — DISCHARGE NOTE ADULT - MEDICATION SUMMARY - MEDICATIONS TO STOP TAKING
I will STOP taking the medications listed below when I get home from the hospital:    furosemide 40 mg oral tablet  -- 1 tab(s) by mouth once a day    Xarelto 15 mg oral tablet  -- 1 tab(s) by mouth once a day (in the evening)    omeprazole 40 mg oral delayed release capsule  -- 1 cap(s) by mouth once a day

## 2018-04-11 NOTE — DISCHARGE NOTE ADULT - PRINCIPAL DIAGNOSIS
Dear Edi,    Your tests were all normal. A copy of your tests are available in My Chart.    Glad to see you at your appointment.  If you have any questions feel free to call.      Sincerely,      CANDELARIO Nunez. Upper GI bleed

## 2018-04-11 NOTE — DISCHARGE NOTE ADULT - SECONDARY DIAGNOSIS.
Dementia Congestive heart failure CKD (chronic kidney disease) stage 3, GFR 30-59 ml/min Atrial fibrillation, chronic

## 2018-04-11 NOTE — DIETITIAN INITIAL EVALUATION ADULT. - PROBLEM SELECTOR PLAN 1
GI Consult House in AM, Surgery F/U, Protonix Drip, Serial CBC, NPO, IVF LR x 12 hours, Fall/aspiration precaution, Hold Xarelto for now,   S/P Coffee  Ground Emesis, HX of GI Bleed in the past ,  Iron studies, Ferritin, Type and Screen ,  Hold Ferrous Sulfate for now,

## 2018-04-11 NOTE — PROGRESS NOTE ADULT - PROBLEM SELECTOR PLAN 3
worsen, yesterday's bmp likely lab error.   continue d5w @ 75cc/hr   monitor fluid status, currently euvolemic  Monitor Na level

## 2018-04-11 NOTE — CONSULT NOTE ADULT - SUBJECTIVE AND OBJECTIVE BOX
Patient is a 89y old  Male who presents with a chief complaint of Coffee ground emesis (11 Apr 2018 09:15)      HPI:  Patient is a 89y Male DNR/DNI , +PPM, S/P IVC Filter - h/o atrial fibrillation on Xarelto, and prior UGIB (1990s, treated with coil embolization - as described by patient's healthcare proxy), BPH, CKD stage 3, Hypothyroid, Dementia - brought in by ambulance after large episode of coffee-ground emesis. Per patient's aides (who lives with the patient and attend to him daily), he had an episode of NBNB emesis the morning prior and then tolerated his evening meal. Then, this AM, he had this episode of "black vomit." In addition, his aides have noted that over the past day or so his mental status has been depressed. At baseline, the patient is demented (disoriented) but typically alert and interactive. He ambulates short distances within the home with assistance. However, for the past day he has been more lethargic and less interactive. He has not complained of any pain. No fever or chills noted by his aides. He is incontinent of urine and stool, and his aides report that he has been regularly urinating and passing soft, formed, brown stool (last BM yesterday). Pt uses walker to ambulate, no HX of Dysphagia, no fever, + RICARDO and signs of dehydration as per Labs noted, I started pt on Protonix Drip, NPO, IVF LR for now x 12 hours pt is on PO Lasix at home , pt is Calm no acute distress, accompanied with his HHA and a friend, S/P CT A/P no contrast tonight: small Ascites, nonobstructive stone in Rt renal pelvis, mildly dilated loops of small  bowel, in LLQ, enlarged prostate, Atrophic B/L kidneys, Occult Blood +, Pt was seen by Surgery tonight NO intervention as per surgery Note,     Of note, all of the patient's healthcare is typically managed at OSH ([facility]).   PCP: Slim Lovelace - [phone]  HCP: Freddie Mares- [phone]  Charlette NAZARIO with ICS: [phone]  GI: Dr. Robin- [phone]    Labs: UA: Large Blood, Na 143, K+ 5.3, BUN 54, Creatinine 2.05, glucose 144, LFT Normal, WBC 7.72, Hgb 13.3, Platelet 239, PT 11.5, PTT 30.3, INR 1.00, Occult Blood +, Lactate 2.4     Vitals: Tem 97.3, HR 72, /80, RR 18, 97% RA (04 Apr 2018 20:22)    hx confirmed for the bed side aide who describes her self as a good friend: [facility] because of vomiting blood: He used to smoke for many years and  he has takes nebulizers at home for h is breathing' l   ?FOLLOWING PRESENT  [x ] Hx of PE/DVT, [x ] Hx COPD, [x ] Hx of Asthma, [ ]x Hx of Hospitalization, [x ]  Hx of BiPAP/CPAP use, [ x] Hx of DEVENDRA    Allergies    penicillin (Hives)  penicillins (Unknown)    Intolerances        PAST MEDICAL & SURGICAL HISTORY:  S/P IVC filter  Pacemaker  BPH (benign prostatic hyperplasia)  GI bleed  Atrial fibrillation, chronic  Hypothyroid  CKD (chronic kidney disease) stage 3, GFR 30-59 ml/min  Congestive heart failure  Dementia  S/P IVC filter  Artificial cardiac pacemaker  History of hip replacement, total, left      FAMILY HISTORY:  No pertinent family history in first degree relatives      Social History: [ > 50 yrs: quit many  years ago ] TOBACCO                  [x  ] ETOH                                 [  x] IVDA/DRUGS    REVIEW OF SYSTEMS      General:x	    Skin/Breast:x  	  Ophthalmologic:x  	  ENMT:	x    Respiratory and Thorax: cough   	  Cardiovascular:	x    Gastrointestinal:	x    Genitourinary:	x    Musculoskeletal:	x    Neurological:	confused    Psychiatric:	x    Hematology/Lymphatics:	x    Endocrine:	x    Allergic/Immunologic:	x    MEDICATIONS  (STANDING):  ammonium lactate 12% Lotion 1 Application(s) Topical two times a day  dextrose 5% + sodium chloride 0.45%. 1000 milliLiter(s) (75 mL/Hr) IV Continuous <Continuous>  dextrose 5%. 1000 milliLiter(s) (50 mL/Hr) IV Continuous <Continuous>  dextrose 50% Injectable 12.5 Gram(s) IV Push once  dextrose 50% Injectable 25 Gram(s) IV Push once  dextrose 50% Injectable 25 Gram(s) IV Push once  docusate sodium Liquid 100 milliGRAM(s) Oral two times a day  finasteride 5 milliGRAM(s) Oral daily  levothyroxine 100 MICROGram(s) Oral daily  pantoprazole Infusion 8 mG/Hr (10 mL/Hr) IV Continuous <Continuous>  polyethylene glycol 3350 17 Gram(s) Oral two times a day  senna 2 Tablet(s) Oral at bedtime  verapamil  milliGRAM(s) Oral daily    MEDICATIONS  (PRN):  dextrose Gel 1 Dose(s) Oral once PRN Blood Glucose LESS THAN 70 milliGRAM(s)/deciliter  glucagon  Injectable 1 milliGRAM(s) IntraMuscular once PRN Glucose LESS THAN 70 milligrams/deciliter       Vital Signs Last 24 Hrs  T(C): 36.9 (11 Apr 2018 10:40), Max: 37.2 (11 Apr 2018 07:45)  T(F): 98.5 (11 Apr 2018 10:40), Max: 99 (11 Apr 2018 07:45)  HR: 70 (11 Apr 2018 10:40) (70 - 76)  BP: 131/70 (11 Apr 2018 10:40) (129/79 - 141/62)  BP(mean): --  RR: 18 (11 Apr 2018 10:40) (18 - 18)  SpO2: 95% (11 Apr 2018 10:40) (94% - 100%)        I&O's Summary      Physical Exam:   GENERAL: NAD, well-groomed, well-developed  HEENT: JEAN/   Atraumatic, Normocephalic  ENMT: No tonsillar erythema, exudates, or enlargement; Moist mucous membranes, Good dentition, No lesions  NECK: Supple, No JVD, Normal thyroid  CHEST/LUNG: coarse breath soundas  CVS: Regular rate and rhythm; No murmurs, rubs, or gallops  GI: : Soft, Nontender, Nondistended; Bowel sounds present  NERVOUS SYSTEM:  Awake  EXTREMITIES:  2+ Peripheral Pulses, No clubbing, cyanosis, or edema  LYMPH: No lymphadenopathy noted  SKIN: No rashes or lesions  ENDOCRINOLOGY: No Thyromegaly  PSYCH: dementia    Labs:  2.5<50<4>>27<<7.365>>2.5<<3><<4><<5<<279>>                            11.4   8.07  )-----------( 194      ( 11 Apr 2018 06:00 )             35.0                         9.7    7.41  )-----------( 184      ( 10 Apr 2018 06:24 )             31.4                         12.1   6.62  )-----------( 227      ( 09 Apr 2018 07:20 )             38.0                         12.3   6.63  )-----------( 221      ( 08 Apr 2018 10:22 )             38.6                         12.3   6.84  )-----------( 240      ( 08 Apr 2018 06:08 )             37.7     04-11    152<H>  |  116<H>  |  28<H>  ----------------------------<  86  3.7   |  23  |  1.40<H>  04-10    135  |  104  |  24<H>  ----------------------------<  524<HH>  5.7<H>   |  23  |  1.28  04-09    153<H>  |  117<H>  |  28<H>  ----------------------------<  139<H>  3.8   |  24  |  1.48<H>  04-08    148<H>  |  112<H>  |  28<H>  ----------------------------<  146<H>  4.2   |  23  |  1.58<H>    Ca    8.9      11 Apr 2018 06:00  Ca    7.7<L>      10 Apr 2018 09:13      CAPILLARY BLOOD GLUCOSE      POCT Blood Glucose.: 82 mg/dL (11 Apr 2018 12:26)  POCT Blood Glucose.: 83 mg/dL (11 Apr 2018 06:57)          D DImer    Cultures:           Wound culture:                04-05 @ 00:38  Organism --  Culture w/ gram stain --  Specimen Source BLOOD PERIPHERAL    Wound culture:                04-04 @ 20:00  Organism --  Culture w/ gram stain --  Specimen Source URINE CATHETER      Abscess culture:             04-05 @ 00:38  Organism --  Gram Stain --  Specimen Source BLOOD PERIPHERAL    Abscess culture:             04-04 @ 20:00  Organism --  Gram Stain --  Specimen Source URINE CATHETER        Tissue culture:           04-05 @ 00:38  Organism --  Gram Stain --  Specimen Source BLOOD PERIPHERAL    Tissue culture:           04-04 @ 20:00  Organism --  Gram Stain --  Specimen Source URINE CATHETER      Body Fluid Smear & Culture:                        04-05 @ 00:38  AFB Smear  --  Culture Acid Fast Body Fluid w/ Smear  --  Culture Acid Fast Smear Concentrated   --    Culture Results:     --  Specimen Source BLOOD PERIPHERAL    Body Fluid Smear & Culture:                        04-04 @ 20:00  AFB Smear  --  Culture Acid Fast Body Fluid w/ Smear  --  Culture Acid Fast Smear Concentrated   --    Culture Results:     --  Specimen Source URINE CATHETER      < from: Xray Chest 1 View AP/PA (04.10.18 @ 14:46) >  COMPARISON:  April 4, 2018 at 3:40 PM. Included portions of the lower   chest on CT scan of the abdomen and pelvis from April 4, 2018.    TECHNIQUE:   AP Portable chest x-ray.    INTERPRETATION:     Heart size and the mediastinum cannot be accurately evaluated on this   projection.  Left chest wall cardiac pacemaker again seen.  A calcified tortuous thoracic aorta is again noted.  There is a calcified linear scarin the right apex. Right apical bullous   change is suggested. There is linear scar versus atelectasis in the right   midlung. Bibasilar reticular opacities are again seen.  No pleural effusion or pneumothorax is seen.              IMPRESSION:  Calcified linear scar in the right apex which suggestion of   right apical bullous change.    Linear scar versus atelectasis in the right midlung.    Bibasilar reticular opacities are seen secondary to atelectasis and   fibrosis seen on the CT scan.        ***Please see the addendum at the top of this report. It may contain   additional important information or changes.****    < end of copied text >        Studies  Chest X-RAY  CT SCAN Chest   CT Abdomen  Venous Dopplers: LE:   Others

## 2018-04-11 NOTE — CONSULT NOTE ADULT - PROBLEM SELECTOR RECOMMENDATION 9
Likely pre-renal   Improving slowly  Monitor renal function at present
has m ild cough, he has been on home nebulizers for  his breathing, and he is an ex smoker: Currently he has minor cough and the chest radiograph did not suggest any pneumonia : for now no antibiotics:
pt has been stable no more hematemesis  c/w Protonix  f/u CBC

## 2018-04-11 NOTE — CONSULT NOTE ADULT - ASSESSMENT
Patient is a 89y Male DNR/DNI , +PPM, S/P IVC Filter - h/o atrial fibrillation on Xarelto, and prior UGIB (1990s, treated with coil embolization - as described by patient's healthcare proxy), BPH, CKD stage 3, Hypothyroid, Dementia - brought in by ambulance after large episode of coffee-ground emesis.

## 2018-04-11 NOTE — DISCHARGE NOTE ADULT - PLAN OF CARE
resolved Resolved.  Monitor for further bleeding. Supportive care. Xarelto held indefinitely due to high risk of bleeding.  Continue follow up with cardiologist. Resolved. H/H and blood counts stable.  Monitor for further bleeding.  Continue protonix - PPI to coat stomach. Supportive care.  Continue with chest PT to help mobilize secretions.   May also continue with nebulizer treatments as needed for secretions as well.  Upright when eating. Continue medications as directed.  No lasix necessary at this time - please follow up with your cardiologist within 2-3 weeks for further treatment and management. Stable at this time. Resolved. H/H and blood counts stable.  Monitor for further bleeding.  Continue protonix - PPI to coat stomach.  Please follow up with your pcp Dr Robin within 1 week of discharge.  Please call to make an appointment [phone] continue current regimen Supportive care.  Continue with chest PT to help mobilize secretions.   May also continue with nebulizer treatments as needed for secretions as well.  Upright when eating.Please follow up with your pcp Dr Robin within 1 week of discharge.  Please call to make an appointment [phone] remain stable Continue medications as directed.  No lasix necessary at this time - please follow up with your cardiologist Dr. Rodriguez [phone] 2-3 weeks for further treatment and management. Stable at this time.Please follow up with your pcp Dr Robin within 1 week of discharge.  Please call to make an appointment [phone] Xarelto held indefinitely due to high risk of bleeding.  Continue follow up with cardiologist. please follow up with your cardiologist Dr. Rodriguez [phone] 2-3 weeks for further treatment and management.

## 2018-04-11 NOTE — DISCHARGE NOTE ADULT - PATIENT PORTAL LINK FT
You can access the [facility] Patient Portal, offered by [facility], by registering with the following website: [email or web address]

## 2018-04-11 NOTE — DIETITIAN INITIAL EVALUATION ADULT. - ENERGY NEEDS
Pt's height: 64" (per friend)            Pt's UBW: ~176# (per friend) not consistent with dosing weight: ~141.3# (4/2) ??Question about accuracy of recorded or stated weights??

## 2018-04-11 NOTE — CONSULT NOTE ADULT - CONSULT REQUESTED BY NAME
[address]   [facility] [place] [address], [address]   OP NOTE       Name:  Opal Jackson   MR#:  [id number]   :  1967   Account #:  [de-identified]    Surgery Date:  2017   Date of Adm:  2017       PREOPERATIVE DIAGNOSES   1. 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 2. Cervical stenosis with myelopathy. POSTOPERATIVE DIAGNOSES   1. 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 2. Cervical stenosis with myelopathy. PROCEDURES PERFORMED:     1. Exploration and fusion. 2. Anterior cervical diskectomy, C4-5.   3. Anterior interbody fusion, C4-5.   4. Anterior plate and screw fixation, C4-5. SURGEON: Dinorah Everett. Cirilo Rosas MD    ASSISTANT: Katelyn Redman PA-C    ESTIMATED BLOOD LOSS: Minimal.     SPECIMENS REMOVED: None    ANESTHESIA:  General    COMPLICATIONS: None. INDICATIONS: This is a 26-year-old female who is approximately 10   years out from an ACDF at C5-C6 had been doing very well when she   had a fall at a company outing directly onto her face. Workup has   shown a disk herniation, C4-C5, with cord compression and stenosis. Because of the worsening myelopathy, the patient is for a cervical   decompression and fusion. DESCRIPTION OF PROCEDURE: The patient was identified, brought   to the operative suite and underwent general anesthesia without   difficulty. Preop neuromonitoring was placed, baselines obtained. Ancef given to the patient. Then neck was placed in neutral position   with 10 pounds of traction. After time-out and prepping and draping, we   did a transverse incision at approximately the level of C4-C5. Dissection   was carried down to the platysma. This was split longitudinally. Blunt   dissection medial to the sterno-cleidomastoid. We identified the   previous fusion and plate and identified this on x-ray, at which time we   then elevated the longus colli and then annulotomy was performed.    We did complete diskectomy and removal of cartilaginous endplates as Primary team well as remaining disk material and a central disk herniation that was   causing cord compression. The posterior longitudinal ligament was then elevated and resected for   further decompression. Neuromonitoring was stable. Bilateral   foraminotomies were performed with undercutting of uncinate   processes. The wound was thoroughly irrigated and then a trial of the   Orthofix Star implant trials were placed with a 17 x 15 mm graft, 8-mm   providing good anterior column reconstruction as well as restoration of   foraminal height. Endplates were lightly rasped. We then packed our   graft with 1 mL of Zainab. This was impacted in place with 2 mm of   countersinking. Neuromonitoring was stable. We then connected the   anterior plate portion of the graft. We then placed 2 screws into the C4-  C5 vertebral bodies. Anterior plate fixation was then used for locking   mechanism. The wounds were thoroughly irrigated. We placed an amnion   membrane for scarring. A #7 flat AMADO drain placed, 2-0 Vicryl   subcutaneous layer, and 3-0 Vicryl in the skin.         Erika Sánchez MD      [facility]   D:  09/14/2017   14:13   T:  09/14/2017   16:15   Job #:  [id number]

## 2018-04-11 NOTE — CONSULT NOTE ADULT - ASSESSMENT
Patient is a 89y Male DNR/DNI , +PPM, S/P IVC Filter - h/o atrial fibrillation on Xarelto, and prior UGIB (1990s, treated with coil embolization - as described by patient's healthcare proxy), BPH, CKD stage 3, Hypothyroid, Dementia - brought in by ambulance after large episode of coffee-ground emesis. Per patient's aides (who lives with the patient and attend to him daily), he had an episode of NBNB emesis the morning prior and then tolerated his evening meal. Then, this AM, he had this episode of "black vomit." In addition, his aides have noted that over the past day or so his mental status has been depressed. At baseline, the patient is demented (disoriented) but typically alert and interactive. He ambulates short distances within the home with assistance. However, for the past day he has been more lethargic and less interactive. He has not complained of any pain. No fever or chills noted by his aides. He is incontinent of urine and stool, and his aides report that he has been regularly urinating and passing soft, formed, brown stool (last BM yesterday). Pt uses walker to ambulate, no HX of Dysphagia, no fever, + RICARDO and signs of dehydration as per Labs noted, I started pt on Protonix Drip, NPO, IVF LR for now x 12 hours pt is on PO Lasix at home , pt is Calm no acute distress, accompanied with his HHA and a friend, S/P CT A/P no contrast tonight: small Ascites, nonobstructive stone in Rt renal pelvis, mildly dilated loops of small  bowel, in LLQ, enlarged prostate, Atrophic B/L kidneys, Occult Blood +, Pt was seen by Surgery tonight NO intervention as per surgery Note,     Of note, all of the patient's healthcare is typically managed at OSH ([facility]).   PCP: Slim Lovelace - [phone]  HCP: Freddie Mares- [phone]  Charlette NAZARIO with ICS: [phone]  GI: Dr. Robin- [phone]

## 2018-04-11 NOTE — DISCHARGE NOTE ADULT - CONDITIONS AT DISCHARGE
Alert & responsive to tactile stimuli. Non verbal. No distress noted. No s/s pain. Skin assessment as per documented. Turn & position Q2. Pt cleaned & made comfortable. Safety maintained and call bell with in reach. Discharge instructions given to HHA at bedside. IV removed with site benign. Pt to be picked up by senior care.

## 2018-04-11 NOTE — DISCHARGE NOTE ADULT - INSTRUCTIONS
Puree diet with honey thick liquids  Ensure pudding, 1 can 3x daily Puree diet with honey thick liquids  Ensure pudding, 1 can 3x daily  Please feeds

## 2018-04-12 LAB
BUN SERPL-MCNC: 25 MG/DL — HIGH (ref 7–23)
CALCIUM SERPL-MCNC: 8.5 MG/DL — SIGNIFICANT CHANGE UP (ref 8.4–10.5)
CHLORIDE SERPL-SCNC: 119 MMOL/L — HIGH (ref 98–107)
CO2 SERPL-SCNC: 21 MMOL/L — LOW (ref 22–31)
CREAT SERPL-MCNC: 1.45 MG/DL — HIGH (ref 0.5–1.3)
GLUCOSE BLDC GLUCOMTR-MCNC: 116 MG/DL — HIGH (ref 70–99)
GLUCOSE BLDC GLUCOMTR-MCNC: 125 MG/DL — HIGH (ref 70–99)
GLUCOSE BLDC GLUCOMTR-MCNC: 133 MG/DL — HIGH (ref 70–99)
GLUCOSE BLDC GLUCOMTR-MCNC: 146 MG/DL — HIGH (ref 70–99)
GLUCOSE SERPL-MCNC: 132 MG/DL — HIGH (ref 70–99)
POTASSIUM SERPL-MCNC: 3.6 MMOL/L — SIGNIFICANT CHANGE UP (ref 3.5–5.3)
POTASSIUM SERPL-SCNC: 3.6 MMOL/L — SIGNIFICANT CHANGE UP (ref 3.5–5.3)
SODIUM SERPL-SCNC: 152 MMOL/L — HIGH (ref 135–145)

## 2018-04-12 RX ORDER — DEXTROSE MONOHYDRATE, SODIUM CHLORIDE, AND POTASSIUM CHLORIDE 50; .745; 4.5 G/1000ML; G/1000ML; G/1000ML
1000 INJECTION, SOLUTION INTRAVENOUS
Qty: 0 | Refills: 0 | Status: DISCONTINUED | OUTPATIENT
Start: 2018-04-12 | End: 2018-04-14

## 2018-04-12 RX ORDER — PANTOPRAZOLE SODIUM 20 MG/1
40 TABLET, DELAYED RELEASE ORAL
Qty: 0 | Refills: 0 | Status: DISCONTINUED | OUTPATIENT
Start: 2018-04-12 | End: 2018-04-14

## 2018-04-12 RX ORDER — LEVOTHYROXINE SODIUM 125 MCG
50 TABLET ORAL AT BEDTIME
Qty: 0 | Refills: 0 | Status: DISCONTINUED | OUTPATIENT
Start: 2018-04-12 | End: 2018-04-14

## 2018-04-12 RX ADMIN — Medication 1 APPLICATION(S): at 17:36

## 2018-04-12 RX ADMIN — Medication 3 MILLILITER(S): at 17:17

## 2018-04-12 RX ADMIN — Medication 100 MILLIGRAM(S): at 17:53

## 2018-04-12 RX ADMIN — Medication 3 MILLILITER(S): at 11:18

## 2018-04-12 RX ADMIN — DEXTROSE MONOHYDRATE, SODIUM CHLORIDE, AND POTASSIUM CHLORIDE 100 MILLILITER(S): 50; .745; 4.5 INJECTION, SOLUTION INTRAVENOUS at 15:00

## 2018-04-12 RX ADMIN — PANTOPRAZOLE SODIUM 40 MILLIGRAM(S): 20 TABLET, DELAYED RELEASE ORAL at 06:12

## 2018-04-12 RX ADMIN — FINASTERIDE 5 MILLIGRAM(S): 5 TABLET, FILM COATED ORAL at 12:07

## 2018-04-12 RX ADMIN — PANTOPRAZOLE SODIUM 40 MILLIGRAM(S): 20 TABLET, DELAYED RELEASE ORAL at 17:53

## 2018-04-12 RX ADMIN — Medication 50 MICROGRAM(S): at 03:16

## 2018-04-12 RX ADMIN — POLYETHYLENE GLYCOL 3350 17 GRAM(S): 17 POWDER, FOR SOLUTION ORAL at 17:53

## 2018-04-12 RX ADMIN — Medication 3 MILLILITER(S): at 03:19

## 2018-04-12 RX ADMIN — SODIUM CHLORIDE 75 MILLILITER(S): 9 INJECTION, SOLUTION INTRAVENOUS at 06:12

## 2018-04-12 RX ADMIN — Medication 1 APPLICATION(S): at 06:11

## 2018-04-12 RX ADMIN — Medication 3 MILLILITER(S): at 20:54

## 2018-04-12 NOTE — PROGRESS NOTE ADULT - PROBLEM SELECTOR PLAN 7
NP spoke with  pt's HCP this morning. HCP wants pt to have pleasure feeding despite the risk of aspiration was explained to him. no PEG tube as per them.

## 2018-04-12 NOTE — PROGRESS NOTE ADULT - PROBLEM SELECTOR PLAN 3
worsen, yesterday's bmp likely lab error.   continue d5w @ 100cc/hr   monitor fluid status, currently euvolemic  Monitor Na level

## 2018-04-12 NOTE — CHART NOTE - NSCHARTNOTEFT_GEN_A_CORE
Spoke with HCP Freddie via phone at pt's bedside. Pt's private HHA and other family friend also present. Pt has been NPO x 2 days - as per HCP, would prefer to not place NGT at this time. Would prefer to try pleasure feeds while sitting pt up in chair. Would not want PEG or invasive procedures for patient. HCP understands risk of aspiration at this time with pleasure feeds. Will also continue chest vest with PRN suctioning to clear secretions. Will trial dysphagia 1 diet with honey thick liquids.

## 2018-04-13 LAB
BUN SERPL-MCNC: 24 MG/DL — HIGH (ref 7–23)
CALCIUM SERPL-MCNC: 8.4 MG/DL — SIGNIFICANT CHANGE UP (ref 8.4–10.5)
CHLORIDE SERPL-SCNC: 114 MMOL/L — HIGH (ref 98–107)
CO2 SERPL-SCNC: 23 MMOL/L — SIGNIFICANT CHANGE UP (ref 22–31)
CREAT SERPL-MCNC: 1.36 MG/DL — HIGH (ref 0.5–1.3)
GLUCOSE BLDC GLUCOMTR-MCNC: 118 MG/DL — HIGH (ref 70–99)
GLUCOSE BLDC GLUCOMTR-MCNC: 126 MG/DL — HIGH (ref 70–99)
GLUCOSE SERPL-MCNC: 110 MG/DL — HIGH (ref 70–99)
MAGNESIUM SERPL-MCNC: 1.9 MG/DL — SIGNIFICANT CHANGE UP (ref 1.6–2.6)
PHOSPHATE SERPL-MCNC: 2.1 MG/DL — LOW (ref 2.5–4.5)
POTASSIUM SERPL-MCNC: 3.7 MMOL/L — SIGNIFICANT CHANGE UP (ref 3.5–5.3)
POTASSIUM SERPL-SCNC: 3.7 MMOL/L — SIGNIFICANT CHANGE UP (ref 3.5–5.3)
SODIUM SERPL-SCNC: 147 MMOL/L — HIGH (ref 135–145)

## 2018-04-13 RX ORDER — POLYETHYLENE GLYCOL 3350 17 G/17G
17 POWDER, FOR SOLUTION ORAL
Qty: 1 | Refills: 0 | OUTPATIENT
Start: 2018-04-13

## 2018-04-13 RX ORDER — FUROSEMIDE 40 MG
1 TABLET ORAL
Qty: 0 | Refills: 0 | COMMUNITY

## 2018-04-13 RX ORDER — PANTOPRAZOLE SODIUM 20 MG/1
1 TABLET, DELAYED RELEASE ORAL
Qty: 30 | Refills: 0 | OUTPATIENT
Start: 2018-04-13 | End: 2018-05-12

## 2018-04-13 RX ORDER — FONDAPARINUX SODIUM 2.5 MG/.5ML
1 INJECTION, SOLUTION SUBCUTANEOUS
Qty: 0 | Refills: 0 | COMMUNITY

## 2018-04-13 RX ORDER — SOD,AMMONIUM,POTASSIUM LACTATE
1 CREAM (GRAM) TOPICAL
Qty: 1 | Refills: 0 | OUTPATIENT
Start: 2018-04-13

## 2018-04-13 RX ORDER — IPRATROPIUM/ALBUTEROL SULFATE 18-103MCG
3 AEROSOL WITH ADAPTER (GRAM) INHALATION
Qty: 360 | Refills: 0 | OUTPATIENT
Start: 2018-04-13 | End: 2018-05-12

## 2018-04-13 RX ORDER — OMEPRAZOLE 10 MG/1
1 CAPSULE, DELAYED RELEASE ORAL
Qty: 0 | Refills: 0 | COMMUNITY

## 2018-04-13 RX ADMIN — Medication 1 APPLICATION(S): at 18:43

## 2018-04-13 RX ADMIN — Medication 50 MICROGRAM(S): at 02:26

## 2018-04-13 RX ADMIN — DEXTROSE MONOHYDRATE, SODIUM CHLORIDE, AND POTASSIUM CHLORIDE 100 MILLILITER(S): 50; .745; 4.5 INJECTION, SOLUTION INTRAVENOUS at 07:32

## 2018-04-13 RX ADMIN — PANTOPRAZOLE SODIUM 40 MILLIGRAM(S): 20 TABLET, DELAYED RELEASE ORAL at 17:25

## 2018-04-13 RX ADMIN — Medication 3 MILLILITER(S): at 22:07

## 2018-04-13 RX ADMIN — POLYETHYLENE GLYCOL 3350 17 GRAM(S): 17 POWDER, FOR SOLUTION ORAL at 17:39

## 2018-04-13 RX ADMIN — Medication 100 MILLIGRAM(S): at 06:38

## 2018-04-13 RX ADMIN — Medication 120 MILLIGRAM(S): at 06:38

## 2018-04-13 RX ADMIN — Medication 3 MILLILITER(S): at 04:12

## 2018-04-13 RX ADMIN — Medication 3 MILLILITER(S): at 10:37

## 2018-04-13 RX ADMIN — FINASTERIDE 5 MILLIGRAM(S): 5 TABLET, FILM COATED ORAL at 11:23

## 2018-04-13 RX ADMIN — Medication 1 APPLICATION(S): at 06:47

## 2018-04-13 RX ADMIN — SENNA PLUS 2 TABLET(S): 8.6 TABLET ORAL at 22:50

## 2018-04-13 RX ADMIN — Medication 50 MICROGRAM(S): at 23:35

## 2018-04-13 RX ADMIN — Medication 100 MILLIGRAM(S): at 17:36

## 2018-04-13 RX ADMIN — DEXTROSE MONOHYDRATE, SODIUM CHLORIDE, AND POTASSIUM CHLORIDE 100 MILLILITER(S): 50; .745; 4.5 INJECTION, SOLUTION INTRAVENOUS at 22:50

## 2018-04-13 RX ADMIN — PANTOPRAZOLE SODIUM 40 MILLIGRAM(S): 20 TABLET, DELAYED RELEASE ORAL at 06:39

## 2018-04-13 RX ADMIN — Medication 3 MILLILITER(S): at 16:19

## 2018-04-13 RX ADMIN — DEXTROSE MONOHYDRATE, SODIUM CHLORIDE, AND POTASSIUM CHLORIDE 100 MILLILITER(S): 50; .745; 4.5 INJECTION, SOLUTION INTRAVENOUS at 17:44

## 2018-04-13 RX ADMIN — POLYETHYLENE GLYCOL 3350 17 GRAM(S): 17 POWDER, FOR SOLUTION ORAL at 06:38

## 2018-04-13 NOTE — PROGRESS NOTE ADULT - ATTENDING COMMENTS
Consider session with  Freddie Mares  Collateral obtained from [facility]
4/12: have gotten better with BD: May have underlying pulmonary fibrosis at the bases: no intervention: Conservative management:
4/12: have gotten better with BD: May have underlying pulmonary fibrosis at the bases: no intervention: Conservative management:  4/13: doing reasonable: remains afebrile as well as with normal leucocytes:

## 2018-04-13 NOTE — CHART NOTE - NSCHARTNOTEFT_GEN_A_CORE
A care plan has been delineated by the primary team:  DNAR  DNI  No PEG  Pleasure feeds    Recommend that the primary team fill out a MOLST prior to discharge.    Thank you for the consult.

## 2018-04-14 VITALS
RESPIRATION RATE: 18 BRPM | HEART RATE: 70 BPM | SYSTOLIC BLOOD PRESSURE: 115 MMHG | TEMPERATURE: 98 F | DIASTOLIC BLOOD PRESSURE: 54 MMHG | OXYGEN SATURATION: 96 %

## 2018-04-14 LAB
BUN SERPL-MCNC: 19 MG/DL — SIGNIFICANT CHANGE UP (ref 7–23)
CALCIUM SERPL-MCNC: 8.5 MG/DL — SIGNIFICANT CHANGE UP (ref 8.4–10.5)
CHLORIDE SERPL-SCNC: 107 MMOL/L — SIGNIFICANT CHANGE UP (ref 98–107)
CO2 SERPL-SCNC: 21 MMOL/L — LOW (ref 22–31)
CREAT SERPL-MCNC: 1.25 MG/DL — SIGNIFICANT CHANGE UP (ref 0.5–1.3)
GLUCOSE BLDC GLUCOMTR-MCNC: 109 MG/DL — HIGH (ref 70–99)
GLUCOSE BLDC GLUCOMTR-MCNC: 111 MG/DL — HIGH (ref 70–99)
GLUCOSE SERPL-MCNC: 110 MG/DL — HIGH (ref 70–99)
POTASSIUM SERPL-MCNC: 3.8 MMOL/L — SIGNIFICANT CHANGE UP (ref 3.5–5.3)
POTASSIUM SERPL-SCNC: 3.8 MMOL/L — SIGNIFICANT CHANGE UP (ref 3.5–5.3)
SODIUM SERPL-SCNC: 139 MMOL/L — SIGNIFICANT CHANGE UP (ref 135–145)

## 2018-04-14 RX ORDER — DEXTROSE MONOHYDRATE, SODIUM CHLORIDE, AND POTASSIUM CHLORIDE 50; .745; 4.5 G/1000ML; G/1000ML; G/1000ML
1000 INJECTION, SOLUTION INTRAVENOUS
Qty: 0 | Refills: 0 | Status: DISCONTINUED | OUTPATIENT
Start: 2018-04-14 | End: 2018-04-14

## 2018-04-14 RX ADMIN — Medication 3 MILLILITER(S): at 10:05

## 2018-04-14 RX ADMIN — FINASTERIDE 5 MILLIGRAM(S): 5 TABLET, FILM COATED ORAL at 11:33

## 2018-04-14 RX ADMIN — DEXTROSE MONOHYDRATE, SODIUM CHLORIDE, AND POTASSIUM CHLORIDE 100 MILLILITER(S): 50; .745; 4.5 INJECTION, SOLUTION INTRAVENOUS at 05:21

## 2018-04-14 RX ADMIN — Medication 1 APPLICATION(S): at 05:21

## 2018-04-14 RX ADMIN — Medication 3 MILLILITER(S): at 03:24

## 2018-04-14 RX ADMIN — DEXTROSE MONOHYDRATE, SODIUM CHLORIDE, AND POTASSIUM CHLORIDE 50 MILLILITER(S): 50; .745; 4.5 INJECTION, SOLUTION INTRAVENOUS at 11:24

## 2018-04-14 RX ADMIN — Medication 120 MILLIGRAM(S): at 05:21

## 2018-04-14 RX ADMIN — POLYETHYLENE GLYCOL 3350 17 GRAM(S): 17 POWDER, FOR SOLUTION ORAL at 05:22

## 2018-04-14 RX ADMIN — PANTOPRAZOLE SODIUM 40 MILLIGRAM(S): 20 TABLET, DELAYED RELEASE ORAL at 05:21

## 2018-04-14 RX ADMIN — Medication 100 MILLIGRAM(S): at 05:21

## 2018-04-14 NOTE — PROGRESS NOTE ADULT - PROBLEM SELECTOR PROBLEM 4
Upper GI bleed
CKD (chronic kidney disease) stage 3, GFR 30-59 ml/min
Congestive heart failure
Upper GI bleed

## 2018-04-14 NOTE — PROGRESS NOTE ADULT - PROBLEM SELECTOR PROBLEM 5
Congestive heart failure
Hypothyroid

## 2018-04-14 NOTE — PROGRESS NOTE ADULT - PROBLEM SELECTOR PLAN 6
on levothyroxine
advance  dementia   supportive care
on levothyroxine
on levothyroxine  4/14 continue home med

## 2018-04-14 NOTE — PROGRESS NOTE ADULT - ASSESSMENT
Patient is a 89y Male DNR/DNI , +PPM, S/P IVC Filter - h/o atrial fibrillation on Xarelto, and prior UGIB (1990s, treated with coil embolization - as described by patient's healthcare proxy), BPH, CKD stage 3, Hypothyroid, Dementia - brought in by ambulance after large episode of coffee-ground emesis. Per patient's aides (who lives with the patient and attend to him daily), he had an episode of NBNB emesis the morning prior and then tolerated his evening meal. Then, this AM, he had this episode of "black vomit." In addition, his aides have noted that over the past day or so his mental status has been depressed. At baseline, the patient is demented (disoriented) but typically alert and interactive. He ambulates short distances within the home with assistance. However, for the past day he has been more lethargic and less interactive. He has not complained of any pain. No fever or chills noted by his aides. He is incontinent of urine and stool, and his aides report that he has been regularly urinating and passing soft, formed, brown stool (last BM yesterday). Pt uses walker to ambulate, no HX of Dysphagia, no fever, + RICARDO and signs of dehydration as per Labs noted, I started pt on Protonix Drip, NPO, IVF LR for now x 12 hours pt is on PO Lasix at home , pt is Calm no acute distress, accompanied with his HHA and a friend, S/P CT A/P no contrast tonight: small Ascites, nonobstructive stone in Rt renal pelvis, mildly dilated loops of small  bowel, in LLQ, enlarged prostate, Atrophic B/L kidneys, Occult Blood +, Pt was seen by Surgery tonight NO intervention as per surgery Note,     Of note, all of the patient's healthcare is typically managed at OSH ([facility]).   PCP: Slim Lovelace - [phone]  HCP: Freddie Mares- [phone]  Charlette NAZARIO with [facility]: [phone]  GI: Dr. Robin- [phone]
EKG - A sense V paced     1) GI bleed - hold xarelto had h/o GI bleed, conservative management, will keep off xarelto sec to GI bleed and age, cont iV PPI , spoke to out pt cardiology Dr Castanon and updated him on hospital cource    2) aFIB - hold xarelto cont verapamil,  2d echo shows grossly normal LV    3) Hypothyroidism - cont synthroid
EKG - A sense V paced   Echo - pending    1) GI bleed - hold xarelto had h/o GI bleed, conservative management, will keep off xarelto sec to GI bleed and age, cont iV PPI     2) aFIB - hold xarelto cont verapamil, get 2d echo     3) Hypothyroidism - cont synthroid
EKG - A sense V paced   Echo - pending    1) GI bleed - hold xarelto had h/o GI bleed, conservative management, will keep off xarelto sec to GI bleed and age, cont iV PPI , spoke to out pt cardiology Dr Castanon and updated him on hospital cource    2) aFIB - hold xarelto cont verapamil,  2d echo shows grossly normal LV    3) Hypothyroidism - cont synthroid
EKG - A sense V paced   Echo - pending    1) GI bleed - hold xarelto had h/o GI bleed, conservative management, will keep off xarelto sec to GI bleed and age, cont iV PPI , spoke to out pt cardiology Dr Castanon and updated him on hospital cource    2) aFIB - hold xarelto cont verapamil, get 2d echo     3) Hypothyroidism - cont synthroid
Patient is a 89y Male DNR/DNI , +PPM, S/P IVC Filter - h/o atrial fibrillation on Xarelto, and prior UGIB (1990s, treated with coil embolization - as described by patient's healthcare proxy), BPH, CKD stage 3, Hypothyroid, Dementia - brought in by ambulance after large episode of coffee-ground emesis. Being followed by GI. Had worsened renal function on admission.
Patient is a 89y Male DNR/DNI , +PPM, S/P IVC Filter - h/o atrial fibrillation on Xarelto, and prior UGIB (1990s, treated with coil embolization - as described by patient's healthcare proxy), BPH, CKD stage 3, Hypothyroid, Dementia - brought in by ambulance after large episode of coffee-ground emesis. Per patient's aides (who lives with the patient and attend to him daily), he had an episode of NBNB emesis the morning prior and then tolerated his evening meal. Then, this AM, he had this episode of "black vomit." In addition, his aides have noted that over the past day or so his mental status has been depressed. At baseline, the patient is demented (disoriented) but typically alert and interactive. He ambulates short distances within the home with assistance. However, for the past day he has been more lethargic and less interactive. He has not complained of any pain. No fever or chills noted by his aides. He is incontinent of urine and stool, and his aides report that he has been regularly urinating and passing soft, formed, brown stool (last BM yesterday). Pt uses walker to ambulate, no HX of Dysphagia, no fever, + RICARDO and signs of dehydration as per Labs noted, I started pt on Protonix Drip, NPO, IVF LR for now x 12 hours pt is on PO Lasix at home , pt is Calm no acute distress, accompanied with his HHA and a friend, S/P CT A/P no contrast tonight: small Ascites, nonobstructive stone in Rt renal pelvis, mildly dilated loops of small  bowel, in LLQ, enlarged prostate, Atrophic B/L kidneys, Occult Blood +, Pt was seen by Surgery tonight NO intervention as per surgery Note,
Patient is a 89y Male DNR/DNI , +PPM, S/P IVC Filter - h/o atrial fibrillation on Xarelto, and prior UGIB (1990s, treated with coil embolization - as described by patient's healthcare proxy), BPH, CKD stage 3, Hypothyroid, Dementia - brought in by ambulance after large episode of coffee-ground emesis. Per patient's aides (who lives with the patient and attend to him daily), he had an episode of NBNB emesis the morning prior and then tolerated his evening meal. Then, this AM, he had this episode of "black vomit." In addition, his aides have noted that over the past day or so his mental status has been depressed. At baseline, the patient is demented (disoriented) but typically alert and interactive. He ambulates short distances within the home with assistance. However, for the past day he has been more lethargic and less interactive. He has not complained of any pain. No fever or chills noted by his aides. He is incontinent of urine and stool, and his aides report that he has been regularly urinating and passing soft, formed, brown stool (last BM yesterday). Pt uses walker to ambulate, no HX of Dysphagia, no fever, + RICARDO and signs of dehydration as per Labs noted, I started pt on Protonix Drip, NPO, IVF LR for now x 12 hours pt is on PO Lasix at home , pt is Calm no acute distress, accompanied with his HHA and a friend, S/P CT A/P no contrast tonight: small Ascites, nonobstructive stone in Rt renal pelvis, mildly dilated loops of small  bowel, in LLQ, enlarged prostate, Atrophic B/L kidneys, Occult Blood +, Pt was seen by Surgery tonight NO intervention as per surgery Note,
Patient is a 89y Male DNR/DNI , +PPM, S/P IVC Filter - h/o atrial fibrillation on Xarelto, and prior UGIB (1990s, treated with coil embolization - as described by patient's healthcare proxy), BPH, CKD stage 3, Hypothyroid, Dementia - brought in by ambulance after large episode of coffee-ground emesis. was evaluated by surgery, no intervention at this time. pt had swallow evaluation test done, pt  failed the test.
Problem/Plan - 1:  ·  Problem: Upper GI bleed. GI fu  monitor cbc  transfuse prn    Problem/Plan - 2:  ·  Problem: Atrial fibrillation, chronic.  Plan: Hold Xarelto for now due to Upper GI Bleed, +PPM ,   on Verapamil ,   cards consult    Problem/Plan - 3:  ·  Problem: CKD (chronic kidney disease) stage 3, renal fu  monitor cr    Problem/Plan - 4:  ·  Problem: Congestive heart failure.  Plan: telemonitoring  cards following    Problem/Plan - 5:  ·  Problem: BPH (benign prostatic hyperplasia).  Plan: on Proscar
Problem/Plan - 1:  ·  Problem: Upper GI bleed. GI fu  monitor cbc  transfuse prn    Problem/Plan - 2:  ·  Problem: Atrial fibrillation, chronic.  Plan: Hold Xarelto for now due to Upper GI Bleed, +PPM ,   on Verapamil ,   cards consult    Problem/Plan - 3:  ·  Problem: CKD (chronic kidney disease) stage 3, renal fu  monitor cr    Problem/Plan - 4:  ·  Problem: Congestive heart failure.  Plan: telemonitoring  cards following    Problem/Plan - 5:  ·  Problem: BPH (benign prostatic hyperplasia).  Plan: on Proscar    Problem/Plan - 6:  Problem: Hypothyroid. Plan: cw synthyroid
Problem/Plan - 1:  ·  Problem: Upper GI bleed. GI fu  monitor cbc  transfuse prn    Problem/Plan - 2:  ·  Problem: Atrial fibrillation, chronic.  Plan: Hold Xarelto for now due to Upper GI Bleed, +PPM ,   on Verapamil ,   cards consult    Problem/Plan - 3:  ·  Problem: CKD (chronic kidney disease) stage 3, renal fu  monitor cr    Problem/Plan - 4:  ·  Problem: Congestive heart failure.  Plan: telemonitoring  cards following    Problem/Plan - 5:  ·  Problem: BPH (benign prostatic hyperplasia).  Plan: on Proscar, Urine Culture.     Problem/Plan - 6:  Problem: Hypothyroid. Plan: cw synthyroid    Problem/Plan - 7:  ·  Problem: Dementia.  Plan: Check ammonia level, TSH, Vit B12, Folate, RPR, Iron studies, Ferritin   Fall/aspiration precaution,
Problem/Plan - 1:  ·  Problem: Upper GI bleed. GI fu  monitor cbc  transfuse prn    Problem/Plan - 2:  ·  Problem: Atrial fibrillation, chronic.  Plan: Hold Xarelto for now due to Upper GI Bleed, +PPM ,   on Verapamil ,   cards consult    Problem/Plan - 3:  ·  Problem: CKD (chronic kidney disease) stage 3, renal fu  monitor cr    Problem/Plan - 4:  ·  Problem: Congestive heart failure.  Plan: telemonitoring  cards following    Problem/Plan - 5:  ·  Problem: Hyponatremia Plan ivf  renal fu
Problem/Plan - 1:  ·  Problem: Upper GI bleed. GI fu  monitor cbc  transfuse prn    Problem/Plan - 2:  ·  Problem: Atrial fibrillation, chronic.  Plan: Hold Xarelto for now due to Upper GI Bleed, +PPM ,   on Verapamil ,   cards consult    Problem/Plan - 3:  ·  Problem: CKD (chronic kidney disease) stage 3, renal fu  monitor cr    Problem/Plan - 4:  ·  Problem: Congestive heart failure.  Plan: telemonitoring  cards following    Problem/Plan - 5:  ·  Problem: Hyponatremia Plan ivf  renal fu
Problem/Plan - 1:  ·  Problem: Upper GI bleed. GI fu  monitor cbc  transfuse prn    Problem/Plan - 2:  ·  Problem: Atrial fibrillation, chronic.  Plan: Hold Xarelto for now due to Upper GI Bleed, +PPM ,   on Verapamil ,   cards consult    Problem/Plan - 3:  ·  Problem: CKD (chronic kidney disease) stage 3, renal fu  monitor cr    Problem/Plan - 4:  ·  Problem: dysphgia Plan NPO  S and s  palliative fu    Problem/Plan - 5:  ·  Problem: Hyponatremia Plan ivf  renal fu
Problem/Plan - 1:  ·  Problem: Upper GI bleed. GI fu  monitor cbc  transfuse prn    Problem/Plan - 2:  ·  Problem: Atrial fibrillation, chronic.  Plan: Hold Xarelto for now due to Upper GI Bleed, +PPM ,   on Verapamil ,   cards consult    Problem/Plan - 3:  ·  Problem: CKD (chronic kidney disease) stage 3, renal fu  monitor cr    Problem/Plan - 4:  ·  Problem: dysphgia Plan tolerating puree diet  S and s  palliative fu    Problem/Plan - 5:  ·  Problem: Hyponatremia Plan ivf
Problem/Plan - 1:  ·  Problem: Upper GI bleed. GI fu  monitor cbc  transfuse prn    Problem/Plan - 2:  ·  Problem: Atrial fibrillation, chronic.  Plan: Hold Xarelto for now due to Upper GI Bleed, +PPM ,   on Verapamil ,   cards consult    Problem/Plan - 3:  ·  Problem: CKD (chronic kidney disease) stage 3, renal fu  monitor cr    Problem/Plan - 4:  ·  Problem: dysphgia Plan tolerating puree diet  S and s  palliative fu    Problem/Plan - 5:  ·  Problem: Hyponatremia Plan ivf  renal fu
Patient is a 89y Male DNR/DNI , +PPM, S/P IVC Filter - h/o atrial fibrillation on Xarelto, and prior UGIB (1990s, treated with coil embolization - as described by patient's healthcare proxy), BPH, CKD stage 3, Hypothyroid, Dementia - brought in by ambulance after large episode of coffee-ground emesis. Being followed by GI. Had worsened renal function on admission.

## 2018-04-14 NOTE — PROGRESS NOTE ADULT - PROBLEM SELECTOR PLAN 4
f/u GI
stable : Maia ue to monitor
f/u GI
stable : Maia ue to monitor
stable : Maia ue to monitor  4/14 trending down.  Creatinine, Serum: 1.25 mg/dL (04.14.18 @ 06:30)
stable, continue  monitoring

## 2018-04-14 NOTE — PROGRESS NOTE ADULT - PROBLEM SELECTOR PROBLEM 3
Hypernatremia
Atrial fibrillation, chronic
CKD (chronic kidney disease) stage 3, GFR 30-59 ml/min
Hypernatremia

## 2018-04-14 NOTE — PROGRESS NOTE ADULT - PROBLEM SELECTOR PROBLEM 2
Upper GI bleed
Atrial fibrillation, chronic
CKD (chronic kidney disease) stage 3, GFR 30-59 ml/min
Upper GI bleed
Upper GI bleed
CKD (chronic kidney disease) stage 3, GFR 30-59 ml/min

## 2018-04-14 NOTE — PROGRESS NOTE ADULT - PROBLEM SELECTOR PLAN 1
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
likely prerenal  change IVF to D5 @ 100cc/hr  monitor bmp
likely prerenal, improving   decrease IVF to D5 @ 50cc/hr  monitor bmp
likely prerenal, improving  continue fluids as ordered  monitor bmp
pt stable no more hematemesis. pt still NPO because he failed swallow evaluation.  CBC stable   c/w protonix
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 no acute respiratory distress
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
likely prerenal  continue IVF to D5 @ 100cc/hr  monitor bmp

## 2018-04-14 NOTE — PROGRESS NOTE ADULT - SUBJECTIVE AND OBJECTIVE BOX
Dr. Maya (Nephrology)  Office [phone]  Cell [phone]  Charity THOMAS  Cell [phone]      Patient is a 89y old  Male who presents with a chief complaint of Coffee ground emesis (11 Apr 2018 09:15)      Patient seen and examined at bedside. No chest pain/sob    VITALS:  T(F): 98.5 (04-11-18 @ 10:40), Max: 99 (04-11-18 @ 07:45)  HR: 70 (04-11-18 @ 10:40)  BP: 131/70 (04-11-18 @ 10:40)  RR: 18 (04-11-18 @ 10:40)  SpO2: 95% (04-11-18 @ 10:40)  Wt(kg): --        PHYSICAL EXAM:  Constitutional: NAD  Neck: No JVD  Respiratory: CTAB, no wheezes, rales or rhonchi  Cardiovascular: S1, S2, RRR  Gastrointestinal: BS+, soft, NT/ND  Extremities: No peripheral edema    Hospital Medications:   MEDICATIONS  (STANDING):  ammonium lactate 12% Lotion 1 Application(s) Topical two times a day  dextrose 5% + sodium chloride 0.45%. 1000 milliLiter(s) (75 mL/Hr) IV Continuous <Continuous>  dextrose 5%. 1000 milliLiter(s) (50 mL/Hr) IV Continuous <Continuous>  dextrose 50% Injectable 12.5 Gram(s) IV Push once  dextrose 50% Injectable 25 Gram(s) IV Push once  dextrose 50% Injectable 25 Gram(s) IV Push once  docusate sodium Liquid 100 milliGRAM(s) Oral two times a day  finasteride 5 milliGRAM(s) Oral daily  levothyroxine 100 MICROGram(s) Oral daily  pantoprazole Infusion 8 mG/Hr (10 mL/Hr) IV Continuous <Continuous>  polyethylene glycol 3350 17 Gram(s) Oral two times a day  senna 2 Tablet(s) Oral at bedtime  verapamil  milliGRAM(s) Oral daily      LABS:  04-11    152<H>  |  116<H>  |  28<H>  ----------------------------<  86  3.7   |  23  |  1.40<H>    Ca    8.9      11 Apr 2018 06:00      Creatinine Trend: 1.40 <--, 1.28 <--, 1.48 <--, 1.58 <--, 1.51 <--, 1.53 <--, 1.78 <--, 1.77 <--, 2.05 <--                                11.4   8.07  )-----------( 194      ( 11 Apr 2018 06:00 )             35.0     Urine Studies:  Urinalysis - [04-04-18 @ 19:30]      Color YELLOW / Appearance CLEAR / SG 1.026 / pH 5.5      Gluc NEGATIVE / Ketone NEGATIVE  / Bili NEGATIVE / Urobili NORMAL       Blood LARGE / Protein 100 / Leuk Est NEGATIVE / Nitrite NEGATIVE      RBC 10-25 / WBC 5-10 / Hyaline  / Gran  / Sq Epi  / Non Sq Epi  / Bacteria FEW      Iron 66, TIBC 178, %sat --      [04-05-18 @ 06:59]  Ferritin 231.1      [04-05-18 @ 06:59]  HbA1c 5.4      [04-11-18 @ 06:00]  TSH 3.66      [04-04-18 @ 23:45]    HBsAg Nonreactive      [04-05-18 @ 06:59]  HCV 0.22, Nonreactive Hepatitis C AB  S/CO Ratio                        Interpretation  < 1.0                                     Non-Reactive  1.0 - 4.9                           Weakly-Reactive  > 5.0                                 Reactive  Non-Reactive: Aperson with a non-reactive HCV antibody  result is considered uninfected.  No further action is  needed unless recent infection is suspected.  In these  cases, consider repeat testing later to detect  seroconversion..  Weakly-Reactive: HCV antibody test is abnormal, HCV RNA  Qualitative test will follow.  Reactive: HCV antibody test is abnormal, HCV RNA  Qualitative test will follow.  Note: HCV antibody testing is performed on the Abbott   system.      [04-05-18 @ 06:59]    Syphilis Screen (Treponema Pallidum Ab) Negative      [04-05-18 @ 06:59]    RADIOLOGY & ADDITIONAL STUDIES:
Dr. Maya (Nephrology)  Office [phone]  Cell [phone]  Charity THOMAS  Cell [phone]      Patient is a 89y old  Male who presents with a chief complaint of Coffee ground emesis (11 Apr 2018 09:15)      Patient seen and examined at bedside. No chest pain/sob    VITALS:  T(F): 97.9 (04-14-18 @ 10:03), Max: 98.5 (04-13-18 @ 17:07)  HR: 70 (04-14-18 @ 10:03)  BP: 115/54 (04-14-18 @ 10:03)  RR: 18 (04-14-18 @ 10:03)  SpO2: 96% (04-14-18 @ 10:03)  Wt(kg): --    04-13 @ 07:01  -  04-14 @ 07:00  --------------------------------------------------------  IN: 1200 mL / OUT: 0 mL / NET: 1200 mL          PHYSICAL EXAM:  Constitutional: NAD  Neck: No JVD  Respiratory: CTAB, no wheezes, rales or rhonchi  Cardiovascular: S1, S2, RRR  Gastrointestinal: BS+, soft, NT/ND  Extremities: No peripheral edema    Hospital Medications:   MEDICATIONS  (STANDING):  ALBUTerol/ipratropium for Nebulization 3 milliLiter(s) Nebulizer every 6 hours  ammonium lactate 12% Lotion 1 Application(s) Topical two times a day  dextrose 5% with potassium chloride 20 mEq/L 1000 milliLiter(s) (50 mL/Hr) IV Continuous <Continuous>  dextrose 5%. 1000 milliLiter(s) (50 mL/Hr) IV Continuous <Continuous>  dextrose 50% Injectable 12.5 Gram(s) IV Push once  dextrose 50% Injectable 25 Gram(s) IV Push once  dextrose 50% Injectable 25 Gram(s) IV Push once  docusate sodium Liquid 100 milliGRAM(s) Oral two times a day  finasteride 5 milliGRAM(s) Oral daily  levothyroxine Injectable 50 MICROGram(s) IV Push at bedtime  pantoprazole  Injectable 40 milliGRAM(s) IV Push two times a day  polyethylene glycol 3350 17 Gram(s) Oral two times a day  senna 2 Tablet(s) Oral at bedtime  verapamil  milliGRAM(s) Oral daily      LABS:  04-14    139  |  107  |  19  ----------------------------<  110<H>  3.8   |  21<L>  |  1.25    Ca    8.5      14 Apr 2018 06:30  Phos  2.1     04-13  Mg     1.9     04-13      Creatinine Trend: 1.25 <--, 1.36 <--, 1.45 <--, 1.40 <--, 1.28 <--, 1.48 <--, 1.58 <--            Urine Studies:  Urinalysis - [04-04-18 @ 19:30]      Color YELLOW / Appearance CLEAR / SG 1.026 / pH 5.5      Gluc NEGATIVE / Ketone NEGATIVE  / Bili NEGATIVE / Urobili NORMAL       Blood LARGE / Protein 100 / Leuk Est NEGATIVE / Nitrite NEGATIVE      RBC 10-25 / WBC 5-10 / Hyaline  / Gran  / Sq Epi  / Non Sq Epi  / Bacteria FEW      Iron 66, TIBC 178, %sat --      [04-05-18 @ 06:59]  Ferritin 231.1      [04-05-18 @ 06:59]  HbA1c 5.4      [04-11-18 @ 06:00]  TSH 3.66      [04-04-18 @ 23:45]    HBsAg Nonreactive      [04-05-18 @ 06:59]  HCV 0.22, Nonreactive Hepatitis C AB  S/CO Ratio                        Interpretation  < 1.0                                     Non-Reactive  1.0 - 4.9                           Weakly-Reactive  > 5.0                                 Reactive  Non-Reactive: Aperson with a non-reactive HCV antibody  result is considered uninfected.  No further action is  needed unless recent infection is suspected.  In these  cases, consider repeat testing later to detect  seroconversion..  Weakly-Reactive: HCV antibody test is abnormal, HCV RNA  Qualitative test will follow.  Reactive: HCV antibody test is abnormal, HCV RNA  Qualitative test will follow.  Note: HCV antibody testing is performed on the Abbott   system.      [04-05-18 @ 06:59]    Syphilis Screen (Treponema Pallidum Ab) Negative      [04-05-18 @ 06:59]    RADIOLOGY & ADDITIONAL STUDIES:
Dr. Maya (Nephrology)  Office [phone]  Cell [phone]  Charity THOMAS  Cell [phone]      Patient is a 89y old  Male who presents with a chief complaint of GI  BLEED, dehydration, Coffee Ground Emesis, RICARDO, (04 Apr 2018 20:22)      Patient seen and examined at bedside.     VITALS:  T(F): 98.5 (04-10-18 @ 06:59), Max: 98.8 (04-09-18 @ 17:23)  HR: 70 (04-10-18 @ 06:59)  BP: 127/75 (04-10-18 @ 06:59)  RR: 19 (04-10-18 @ 06:59)  SpO2: 96% (04-10-18 @ 06:59)  Wt(kg): --    04-09 @ 07:01  -  04-10 @ 07:00  --------------------------------------------------------  IN: 300 mL / OUT: 2 mL / NET: 298 mL          PHYSICAL EXAM:  Constitutional: NAD  Neck: No JVD  Respiratory: CTAB, no wheezes, rales or rhonchi  Cardiovascular: S1, S2, RRR  Gastrointestinal: BS+, soft, NT/ND  Extremities: No peripheral edema    Hospital Medications:   MEDICATIONS  (STANDING):  ammonium lactate 12% Lotion 1 Application(s) Topical two times a day  dextrose 5% with potassium chloride 20 mEq/L 1000 milliLiter(s) (75 mL/Hr) IV Continuous <Continuous>  docusate sodium Liquid 100 milliGRAM(s) Oral two times a day  finasteride 5 milliGRAM(s) Oral daily  lactated ringers. 1000 milliLiter(s) (75 mL/Hr) IV Continuous <Continuous>  levothyroxine 100 MICROGram(s) Oral daily  pantoprazole Infusion 8 mG/Hr (10 mL/Hr) IV Continuous <Continuous>  polyethylene glycol 3350 17 Gram(s) Oral two times a day  senna 2 Tablet(s) Oral at bedtime  verapamil  milliGRAM(s) Oral daily      LABS:  04-09    153<H>  |  117<H>  |  28<H>  ----------------------------<  139<H>  3.8   |  24  |  1.48<H>    Ca    9.0      09 Apr 2018 07:20      Creatinine Trend: 1.48 <--, 1.58 <--, 1.51 <--, 1.53 <--, 1.78 <--, 1.77 <--, 2.05 <--                                9.7    7.41  )-----------( 184      ( 10 Apr 2018 06:24 )             31.4     Urine Studies:  Urinalysis - [04-04-18 @ 19:30]      Color YELLOW / Appearance CLEAR / SG 1.026 / pH 5.5      Gluc NEGATIVE / Ketone NEGATIVE  / Bili NEGATIVE / Urobili NORMAL       Blood LARGE / Protein 100 / Leuk Est NEGATIVE / Nitrite NEGATIVE      RBC 10-25 / WBC 5-10 / Hyaline  / Gran  / Sq Epi  / Non Sq Epi  / Bacteria FEW      Iron 66, TIBC 178, %sat --      [04-05-18 @ 06:59]  Ferritin 231.1      [04-05-18 @ 06:59]  TSH 3.66      [04-04-18 @ 23:45]    HBsAg Nonreactive      [04-05-18 @ 06:59]  HCV 0.22, Nonreactive Hepatitis C AB  S/CO Ratio                        Interpretation  < 1.0                                     Non-Reactive  1.0 - 4.9                           Weakly-Reactive  > 5.0                                 Reactive  Non-Reactive: Aperson with a non-reactive HCV antibody  result is considered uninfected.  No further action is  needed unless recent infection is suspected.  In these  cases, consider repeat testing later to detect  seroconversion..  Weakly-Reactive: HCV antibody test is abnormal, HCV RNA  Qualitative test will follow.  Reactive: HCV antibody test is abnormal, HCV RNA  Qualitative test will follow.  Note: HCV antibody testing is performed on the Abbott   system.      [04-05-18 @ 06:59]    Syphilis Screen (Treponema Pallidum Ab) Negative      [04-05-18 @ 06:59]    RADIOLOGY & ADDITIONAL STUDIES:
Dr. Maya (Nephrology)  Office [phone]  Cell [phone]  Charity THOMAS  Cell [phone]      Patient is a 89y old  Male who presents with a chief complaint of GI  BLEED, dehydration, Coffee Ground Emesis, RICARDO, (04 Apr 2018 20:22)      Patient seen and examined at bedside. No chest pain/sob    VITALS:  T(F): 98.3 (04-09-18 @ 10:53), Max: 99.2 (04-08-18 @ 22:46)  HR: 72 (04-09-18 @ 10:53)  BP: 139/76 (04-09-18 @ 10:53)  RR: 18 (04-09-18 @ 10:53)  SpO2: 95% (04-09-18 @ 10:53)  Wt(kg): --    04-08 @ 07:01  -  04-09 @ 07:00  --------------------------------------------------------  IN: 0 mL / OUT: 0 mL / NET: 0 mL          PHYSICAL EXAM:  Constitutional: NAD  Neck: No JVD  Respiratory: CTAB, no wheezes, rales or rhonchi  Cardiovascular: S1, S2, RRR  Gastrointestinal: BS+, soft, NT/ND  Extremities: No peripheral edema    Hospital Medications:   MEDICATIONS  (STANDING):  ammonium lactate 12% Lotion 1 Application(s) Topical two times a day  dextrose 5% with potassium chloride 20 mEq/L 1000 milliLiter(s) (75 mL/Hr) IV Continuous <Continuous>  docusate sodium Liquid 100 milliGRAM(s) Oral two times a day  finasteride 5 milliGRAM(s) Oral daily  lactated ringers. 1000 milliLiter(s) (75 mL/Hr) IV Continuous <Continuous>  levothyroxine 100 MICROGram(s) Oral daily  pantoprazole Infusion 8 mG/Hr (10 mL/Hr) IV Continuous <Continuous>  polyethylene glycol 3350 17 Gram(s) Oral two times a day  senna 2 Tablet(s) Oral at bedtime  verapamil  milliGRAM(s) Oral daily      LABS:  04-09    153<H>  |  117<H>  |  28<H>  ----------------------------<  139<H>  3.8   |  24  |  1.48<H>    Ca    9.0      09 Apr 2018 07:20      Creatinine Trend: 1.48 <--, 1.58 <--, 1.51 <--, 1.53 <--, 1.78 <--, 1.77 <--, 2.05 <--                                12.1   6.62  )-----------( 227      ( 09 Apr 2018 07:20 )             38.0     Urine Studies:  Urinalysis - [04-04-18 @ 19:30]      Color YELLOW / Appearance CLEAR / SG 1.026 / pH 5.5      Gluc NEGATIVE / Ketone NEGATIVE  / Bili NEGATIVE / Urobili NORMAL       Blood LARGE / Protein 100 / Leuk Est NEGATIVE / Nitrite NEGATIVE      RBC 10-25 / WBC 5-10 / Hyaline  / Gran  / Sq Epi  / Non Sq Epi  / Bacteria FEW      Iron 66, TIBC 178, %sat --      [04-05-18 @ 06:59]  Ferritin 231.1      [04-05-18 @ 06:59]  TSH 3.66      [04-04-18 @ 23:45]    HBsAg Nonreactive      [04-05-18 @ 06:59]  HCV 0.22, Nonreactive Hepatitis C AB  S/CO Ratio                        Interpretation  < 1.0                                     Non-Reactive  1.0 - 4.9                           Weakly-Reactive  > 5.0                                 Reactive  Non-Reactive: Aperson with a non-reactive HCV antibody  result is considered uninfected.  No further action is  needed unless recent infection is suspected.  In these  cases, consider repeat testing later to detect  seroconversion..  Weakly-Reactive: HCV antibody test is abnormal, HCV RNA  Qualitative test will follow.  Reactive: HCV antibody test is abnormal, HCV RNA  Qualitative test will follow.  Note: HCV antibody testing is performed on the Abbott   system.      [04-05-18 @ 06:59]    Syphilis Screen (Treponema Pallidum Ab) Negative      [04-05-18 @ 06:59]    RADIOLOGY & ADDITIONAL STUDIES:
Dr. Maya (Nephrology)  Office [phone]  Cell [phone]  Charity THOMAS  Cell [phone]      Patient is a 89y old  Male who presents with a chief complaint of Coffee ground emesis (11 Apr 2018 09:15)      Patient seen and examined at bedside. No chest pain/sob    VITALS:  T(F): 98.2 (04-12-18 @ 06:09), Max: 98.2 (04-11-18 @ 17:33)  HR: 74 (04-12-18 @ 11:18)  BP: 129/66 (04-12-18 @ 06:09)  RR: 20 (04-12-18 @ 06:09)  SpO2: 94% (04-12-18 @ 11:18)  Wt(kg): --        PHYSICAL EXAM:  Constitutional: NAD  Neck: No JVD  Respiratory: right side slight rhonchi   Cardiovascular: S1, S2, RRR  Gastrointestinal: BS+, soft, NT/ND  Extremities: No peripheral edema    Hospital Medications:   MEDICATIONS  (STANDING):  ALBUTerol/ipratropium for Nebulization 3 milliLiter(s) Nebulizer every 6 hours  ammonium lactate 12% Lotion 1 Application(s) Topical two times a day  dextrose 5% with potassium chloride 20 mEq/L 1000 milliLiter(s) (100 mL/Hr) IV Continuous <Continuous>  dextrose 5%. 1000 milliLiter(s) (50 mL/Hr) IV Continuous <Continuous>  dextrose 50% Injectable 12.5 Gram(s) IV Push once  dextrose 50% Injectable 25 Gram(s) IV Push once  dextrose 50% Injectable 25 Gram(s) IV Push once  docusate sodium Liquid 100 milliGRAM(s) Oral two times a day  finasteride 5 milliGRAM(s) Oral daily  levothyroxine Injectable 50 MICROGram(s) IV Push at bedtime  pantoprazole  Injectable 40 milliGRAM(s) IV Push two times a day  polyethylene glycol 3350 17 Gram(s) Oral two times a day  senna 2 Tablet(s) Oral at bedtime  verapamil  milliGRAM(s) Oral daily      LABS:  04-12    152<H>  |  119<H>  |  25<H>  ----------------------------<  132<H>  3.6   |  21<L>  |  1.45<H>    Ca    8.5      12 Apr 2018 05:35      Creatinine Trend: 1.45 <--, 1.40 <--, 1.28 <--, 1.48 <--, 1.58 <--, 1.51 <--, 1.53 <--                                11.4   8.07  )-----------( 194      ( 11 Apr 2018 06:00 )             35.0     Urine Studies:  Urinalysis - [04-04-18 @ 19:30]      Color YELLOW / Appearance CLEAR / SG 1.026 / pH 5.5      Gluc NEGATIVE / Ketone NEGATIVE  / Bili NEGATIVE / Urobili NORMAL       Blood LARGE / Protein 100 / Leuk Est NEGATIVE / Nitrite NEGATIVE      RBC 10-25 / WBC 5-10 / Hyaline  / Gran  / Sq Epi  / Non Sq Epi  / Bacteria FEW      Iron 66, TIBC 178, %sat --      [04-05-18 @ 06:59]  Ferritin 231.1      [04-05-18 @ 06:59]  HbA1c 5.4      [04-11-18 @ 06:00]  TSH 3.66      [04-04-18 @ 23:45]    HBsAg Nonreactive      [04-05-18 @ 06:59]  HCV 0.22, Nonreactive Hepatitis C AB  S/CO Ratio                        Interpretation  < 1.0                                     Non-Reactive  1.0 - 4.9                           Weakly-Reactive  > 5.0                                 Reactive  Non-Reactive: Aperson with a non-reactive HCV antibody  result is considered uninfected.  No further action is  needed unless recent infection is suspected.  In these  cases, consider repeat testing later to detect  seroconversion..  Weakly-Reactive: HCV antibody test is abnormal, HCV RNA  Qualitative test will follow.  Reactive: HCV antibody test is abnormal, HCV RNA  Qualitative test will follow.  Note: HCV antibody testing is performed on the Abbott   system.      [04-05-18 @ 06:59]    Syphilis Screen (Treponema Pallidum Ab) Negative      [04-05-18 @ 06:59]    RADIOLOGY & ADDITIONAL STUDIES:
INTERVAL HPI/OVERNIGHT EVENTS: pt is seen and examined at bedside  no new overnight  event as per nursing staff      Allergies    penicillin (Hives)  penicillins (Unknown)    Intolerances      ADVANCE DIRECTIVES:    DNR Yes    MOLST [ ] YES [ ] NO     MEDICATIONS  (STANDING):  ALBUTerol/ipratropium for Nebulization 3 milliLiter(s) Nebulizer every 6 hours  ammonium lactate 12% Lotion 1 Application(s) Topical two times a day  dextrose 5% + sodium chloride 0.45%. 1000 milliLiter(s) (75 mL/Hr) IV Continuous <Continuous>  dextrose 5%. 1000 milliLiter(s) (50 mL/Hr) IV Continuous <Continuous>  dextrose 50% Injectable 12.5 Gram(s) IV Push once  dextrose 50% Injectable 25 Gram(s) IV Push once  dextrose 50% Injectable 25 Gram(s) IV Push once  docusate sodium Liquid 100 milliGRAM(s) Oral two times a day  finasteride 5 milliGRAM(s) Oral daily  levothyroxine Injectable 50 MICROGram(s) IV Push at bedtime  pantoprazole  Injectable 40 milliGRAM(s) IV Push two times a day  polyethylene glycol 3350 17 Gram(s) Oral two times a day  senna 2 Tablet(s) Oral at bedtime  verapamil  milliGRAM(s) Oral daily    MEDICATIONS  (PRN):  dextrose Gel 1 Dose(s) Oral once PRN Blood Glucose LESS THAN 70 milliGRAM(s)/deciliter  glucagon  Injectable 1 milliGRAM(s) IntraMuscular once PRN Glucose LESS THAN 70 milligrams/deciliter      PRESENT SYMPTOMS:  Source: [ ] Patient   [ x] Family   [ x] Team     Pain:                        [ x] No [ ] Yes             [ ] Mild [ ] Moderate [ ] Severe      Dyspnea:                [ x] No [ ] Yes             [ ] Mild [ ] Moderate [ ] Severe    Anxiety:                  [x ] No [ ] Yes             [ ] Mild [ ] Moderate [ ] Severe    Fatigue:                  [ ] No [ x] Yes             [ ] Mild [ ] Moderate [ ] Severe    Nausea:                  [x ] No [ ] Yes             [ ] Mild [ ] Moderate [ ] Severe    Loss of appetite:   [ ] No [x ] Yes             [ ] Mild [ ] Moderate [ ] Severe    Constipation:        [ ] No [ ] Yes             [ ] Mild [ ] Moderate [ ] Severe    Other Symptoms:  [ ] All other review of systems negative   [x ] Unable to obtain due to poor mentation     Karnofsky Performance Score/Palliative Performance Status Version 2:        40 %    PHYSICAL EXAM:  Vital Signs Last 24 Hrs  T(C): 36.8 (12 Apr 2018 06:09), Max: 36.8 (11 Apr 2018 17:33)  T(F): 98.2 (12 Apr 2018 06:09), Max: 98.2 (11 Apr 2018 17:33)  HR: 70 (12 Apr 2018 06:09) (67 - 76)  BP: 129/66 (12 Apr 2018 06:09) (123/76 - 136/69)  BP(mean): --  RR: 20 (12 Apr 2018 06:09) (18 - 20)  SpO2: 96% (12 Apr 2018 06:09) (95% - 98%) I&O's Summary      General:  [x ] Alert  [ ] Oriented x      [ ] Lethargic  [ ] Agitated   [ ] Cachexia   [ ] Unarousable  [ ] Verbal  [ ] Non-Verbal    HEENT:  [ ] Normal   [x ] Dry mouth   [ ] ET Tube    [ ] Trach  [ ] Oral lesions    Lungs:   [ ] Clear [ ] Tachypnea  [ ] Audible excessive secretions   [ ] Rhonchi        [ ] Right [ ] Left [ ] Bilateral  [ ] Crackles        [ ] Right [ ] Left [ ] Bilateral  [ ] Wheezing     [ ] Right [ ] Left [ ] Bilateral    Cardiovascular:  [ ] Regular [ ] Irregular [ ] Tachycardia   [ ] Bradycardia  [ ] Murmur [ ] Other    Abdomen: [x ] Soft  [ ] Distended   [ ] +BS  [ x] Non tender [ ] Tender  [ ]PEG   [ ]OGT/ NGT   Last BM:       Genitourinary: [ ] Normal [ ] Incontinent   [ ] Oliguria/Anuria   [ ] Silver    Musculoskeletal:  [ ] Normal   [ x] Weakness  [ ] Bedbound/Wheelchair bound [ ] Edema    Neurological: [ ] No focal deficits  [ x] Cognitive impairment  [ ] Dysphagia [ ] Dysarthria [ ] Paresis [ ] Other     Skin: [ ] Normal   [ ] Pressure ulcer(s)                  [ ] Rash    LABS:                        11.4   8.07  )-----------( 194      ( 11 Apr 2018 06:00 )             35.0     04-12    152<H>  |  119<H>  |  25<H>  ----------------------------<  132<H>  3.6   |  21<L>  |  1.45<H>    Ca    8.5      12 Apr 2018 05:35            Shock: [ ] Septic [ ] Cardiogenic [ ] Neurologic [ ] Hypovolemic  Vasopressors x   Inotrophs x     Oral Intake: [ ] Unable/mouth care only [ ] Minimal [ ] Moderate [ ] Full Capability    Diet: [ ] NPO [ ] Tube feeds [ ] TPN [ ] Other     RADIOLOGY & ADDITIONAL STUDIES:    REFERRALS:   [ ] Chaplaincy  [ ] Hospice  [ ] Child Life  [ ] Social Work  [ ] Case management [ ] Holistic Therapy
Patient is a 89y old  Male who presents with a chief complaint of Coffee ground emesis (11 Apr 2018 09:15)      Any change in ROS: coughs with eating: no resp distress:  alert and awake: no respiratory distress:     MEDICATIONS  (STANDING):  ALBUTerol/ipratropium for Nebulization 3 milliLiter(s) Nebulizer every 6 hours  ammonium lactate 12% Lotion 1 Application(s) Topical two times a day  dextrose 5% with potassium chloride 20 mEq/L 1000 milliLiter(s) (100 mL/Hr) IV Continuous <Continuous>  dextrose 5%. 1000 milliLiter(s) (50 mL/Hr) IV Continuous <Continuous>  dextrose 50% Injectable 12.5 Gram(s) IV Push once  dextrose 50% Injectable 25 Gram(s) IV Push once  dextrose 50% Injectable 25 Gram(s) IV Push once  docusate sodium Liquid 100 milliGRAM(s) Oral two times a day  finasteride 5 milliGRAM(s) Oral daily  levothyroxine Injectable 50 MICROGram(s) IV Push at bedtime  pantoprazole  Injectable 40 milliGRAM(s) IV Push two times a day  polyethylene glycol 3350 17 Gram(s) Oral two times a day  senna 2 Tablet(s) Oral at bedtime  verapamil  milliGRAM(s) Oral daily    MEDICATIONS  (PRN):  dextrose Gel 1 Dose(s) Oral once PRN Blood Glucose LESS THAN 70 milliGRAM(s)/deciliter  glucagon  Injectable 1 milliGRAM(s) IntraMuscular once PRN Glucose LESS THAN 70 milligrams/deciliter    Vital Signs Last 24 Hrs  T(C): 36.9 (13 Apr 2018 10:09), Max: 37.4 (13 Apr 2018 06:34)  T(F): 98.4 (13 Apr 2018 10:09), Max: 99.4 (13 Apr 2018 06:34)  HR: 73 (13 Apr 2018 10:37) (70 - 77)  BP: 113/62 (13 Apr 2018 10:09) (113/62 - 147/73)  BP(mean): --  RR: 17 (13 Apr 2018 10:09) (17 - 18)  SpO2: 96% (13 Apr 2018 10:37) (95% - 98%)    I&O's Summary    12 Apr 2018 07:01  -  13 Apr 2018 07:00  --------------------------------------------------------  IN: 400 mL / OUT: 0 mL / NET: 400 mL          Physical Exam:   GENERAL: NAD, well-groomed, well-developed  HEENT: JEAN/   Atraumatic, Normocephalic  ENMT: No tonsillar erythema, exudates, or enlargement; Moist mucous membranes, Good dentition, No lesions  NECK: Supple, No JVD, Normal thyroid  CHEST/LUNG: Clear to auscultaion, ; No rales, rhonchi, wheezing, or rubs  CVS: Regular rate and rhythm; No murmurs, rubs, or gallops  GI: : Soft, Nontender, Nondistended; Bowel sounds present  NERVOUS SYSTEM:  Alert & Awkae  EXTREMITIES:  2+ Peripheral Pulses, No clubbing, cyanosis, or edema  LYMPH: No lymphadenopathy noted  SKIN: No rashes or lesions  ENDOCRINOLOGY: No Thyromegaly  PSYCH: Appropriate    Labs:                              11.4   8.07  )-----------( 194      ( 11 Apr 2018 06:00 )             35.0                         9.7    7.41  )-----------( 184      ( 10 Apr 2018 06:24 )             31.4     04-13    147<H>  |  114<H>  |  24<H>  ----------------------------<  110<H>  3.7   |  23  |  1.36<H>  04-12    152<H>  |  119<H>  |  25<H>  ----------------------------<  132<H>  3.6   |  21<L>  |  1.45<H>  04-11    152<H>  |  116<H>  |  28<H>  ----------------------------<  86  3.7   |  23  |  1.40<H>  04-10    135  |  104  |  24<H>  ----------------------------<  524<HH>  5.7<H>   |  23  |  1.28    Ca    8.4      13 Apr 2018 06:00  Ca    8.5      12 Apr 2018 05:35  Phos  2.1     04-13  Mg     1.9     04-13      CAPILLARY BLOOD GLUCOSE      POCT Blood Glucose.: 126 mg/dL (13 Apr 2018 11:48)  POCT Blood Glucose.: 146 mg/dL (12 Apr 2018 22:08)  POCT Blood Glucose.: 133 mg/dL (12 Apr 2018 16:32)            Cultures:         < from: Xray Chest 1 View AP/PA (04.10.18 @ 14:46) >  There is a calcified linear scarin the right apex. Right apical bullous   change is suggested. There is linear scar versus atelectasis in the right   midlung. Bibasilar reticular opacities are again seen.  No pleural effusion or pneumothorax is seen.              IMPRESSION:  Calcified linear scar in the right apex which suggestion of   right apical bullous change.    Linear scar versus atelectasis in the right midlung.    Bibasilar reticular opacities are seen secondary to atelectasis and   fibrosis seen on the CT scan.        ***Please see the addendum at the top of this report. It may contain   additional important information or changes.****          JOLENE SUBRAMANIAN M.D., ATTENDING RADIOLOGIST  This document has been electronically signed. Apr 10 2018  3:15PM  Addend:  JOLENE SUBRAMANIAN M.D., ATTENDING RADIOLOGIST  This addendum was electronically signed on: Apr 10 2018  5:24PM.        < end of copied text >                      Studies  Chest X-RAY  CT SCAN Chest   Venous Dopplers: LE:   CT Abdomen  Others      < from: Xray Chest 1 View AP/PA (04.10.18 @ 14:46) >  No pleural effusion or pneumothorax is seen.              IMPRESSION:  Calcified linear scar in the right apex which suggestion of   right apical bullous change.    Linear scar versus atelectasis in the right midlung.    Bibasilar reticular opacities are seen secondary to atelectasis and   fibrosis seen on the CT scan.        ***Please see the addendum at the top of this report. It may contain   additional important information or changes.****          JOLENE SUBRAMANIAN M.D., ATTENDING RADIOLOGIST  This document has been electronically signed. Apr 10 2018  3:15PM  Addend:  JOLENE SUBRAMANIAN M.D., ATTENDING RADIOLOGIST    < end of copied text >
Patient is a 89y old  Male who presents with a chief complaint of Coffee ground emesis (11 Apr 2018 09:15)      INTERVAL HPI/OVERNIGHT EVENTS:  T(C): 36.8 (04-11-18 @ 17:33), Max: 37.2 (04-11-18 @ 07:45)  HR: 70 (04-11-18 @ 17:33) (70 - 76)  BP: 123/76 (04-11-18 @ 17:33) (123/76 - 135/76)  RR: 18 (04-11-18 @ 17:33) (18 - 18)  SpO2: 95% (04-11-18 @ 17:33) (95% - 97%)  Wt(kg): --  I&O's Summary      LABS:                        11.4   8.07  )-----------( 194      ( 11 Apr 2018 06:00 )             35.0     04-11    152<H>  |  116<H>  |  28<H>  ----------------------------<  86  3.7   |  23  |  1.40<H>    Ca    8.9      11 Apr 2018 06:00          CAPILLARY BLOOD GLUCOSE      POCT Blood Glucose.: 82 mg/dL (11 Apr 2018 12:26)  POCT Blood Glucose.: 83 mg/dL (11 Apr 2018 06:57)            MEDICATIONS  (STANDING):  ALBUTerol/ipratropium for Nebulization 3 milliLiter(s) Nebulizer every 6 hours  ammonium lactate 12% Lotion 1 Application(s) Topical two times a day  dextrose 5% + sodium chloride 0.45%. 1000 milliLiter(s) (75 mL/Hr) IV Continuous <Continuous>  dextrose 5%. 1000 milliLiter(s) (50 mL/Hr) IV Continuous <Continuous>  dextrose 50% Injectable 12.5 Gram(s) IV Push once  dextrose 50% Injectable 25 Gram(s) IV Push once  dextrose 50% Injectable 25 Gram(s) IV Push once  docusate sodium Liquid 100 milliGRAM(s) Oral two times a day  finasteride 5 milliGRAM(s) Oral daily  levothyroxine 100 MICROGram(s) Oral daily  pantoprazole Infusion 8 mG/Hr (10 mL/Hr) IV Continuous <Continuous>  polyethylene glycol 3350 17 Gram(s) Oral two times a day  senna 2 Tablet(s) Oral at bedtime  verapamil  milliGRAM(s) Oral daily    MEDICATIONS  (PRN):  dextrose Gel 1 Dose(s) Oral once PRN Blood Glucose LESS THAN 70 milliGRAM(s)/deciliter  glucagon  Injectable 1 milliGRAM(s) IntraMuscular once PRN Glucose LESS THAN 70 milligrams/deciliter          PHYSICAL EXAM:  GENERAL: NAD, well-groomed, well-developed  HEAD:  Atraumatic, Normocephalic  CHEST/LUNG: Clear to percussion bilaterally; No rales, rhonchi, wheezing, or rubs  HEART: Regular rate and rhythm; No murmurs, rubs, or gallops  ABDOMEN: Soft, Nontender, Nondistended; Bowel sounds present  EXTREMITIES:  2+ Peripheral Pulses, No clubbing, cyanosis, or edema  LYMPH: No lymphadenopathy noted  SKIN: No rashes or lesions    Care Discussed with Consultants/Other Providers [ ] YES  [ ] NO
Patient is a 89y old  Male who presents with a chief complaint of Coffee ground emesis (11 Apr 2018 09:15)      INTERVAL HPI/OVERNIGHT EVENTS:  T(C): 36.9 (04-13-18 @ 17:07), Max: 37.4 (04-13-18 @ 06:34)  HR: 70 (04-13-18 @ 17:07) (70 - 77)  BP: 122/62 (04-13-18 @ 17:07) (113/62 - 147/73)  RR: 18 (04-13-18 @ 17:07) (17 - 18)  SpO2: 95% (04-13-18 @ 17:07) (95% - 98%)  Wt(kg): --  I&O's Summary    12 Apr 2018 07:01  -  13 Apr 2018 07:00  --------------------------------------------------------  IN: 400 mL / OUT: 0 mL / NET: 400 mL    13 Apr 2018 07:01  -  13 Apr 2018 19:51  --------------------------------------------------------  IN: 1200 mL / OUT: 0 mL / NET: 1200 mL        LABS:    04-13    147<H>  |  114<H>  |  24<H>  ----------------------------<  110<H>  3.7   |  23  |  1.36<H>    Ca    8.4      13 Apr 2018 06:00  Phos  2.1     04-13  Mg     1.9     04-13          CAPILLARY BLOOD GLUCOSE      POCT Blood Glucose.: 118 mg/dL (13 Apr 2018 16:37)  POCT Blood Glucose.: 126 mg/dL (13 Apr 2018 11:48)  POCT Blood Glucose.: 146 mg/dL (12 Apr 2018 22:08)            MEDICATIONS  (STANDING):  ALBUTerol/ipratropium for Nebulization 3 milliLiter(s) Nebulizer every 6 hours  ammonium lactate 12% Lotion 1 Application(s) Topical two times a day  dextrose 5% with potassium chloride 20 mEq/L 1000 milliLiter(s) (100 mL/Hr) IV Continuous <Continuous>  dextrose 5%. 1000 milliLiter(s) (50 mL/Hr) IV Continuous <Continuous>  dextrose 50% Injectable 12.5 Gram(s) IV Push once  dextrose 50% Injectable 25 Gram(s) IV Push once  dextrose 50% Injectable 25 Gram(s) IV Push once  docusate sodium Liquid 100 milliGRAM(s) Oral two times a day  finasteride 5 milliGRAM(s) Oral daily  levothyroxine Injectable 50 MICROGram(s) IV Push at bedtime  pantoprazole  Injectable 40 milliGRAM(s) IV Push two times a day  polyethylene glycol 3350 17 Gram(s) Oral two times a day  senna 2 Tablet(s) Oral at bedtime  verapamil  milliGRAM(s) Oral daily    MEDICATIONS  (PRN):  dextrose Gel 1 Dose(s) Oral once PRN Blood Glucose LESS THAN 70 milliGRAM(s)/deciliter  glucagon  Injectable 1 milliGRAM(s) IntraMuscular once PRN Glucose LESS THAN 70 milligrams/deciliter          PHYSICAL EXAM:  GENERAL: NAD, well-groomed, well-developed  HEAD:  Atraumatic, Normocephalic  CHEST/LUNG: Clear to percussion bilaterally; No rales, rhonchi, wheezing, or rubs  HEART: Regular rate and rhythm; No murmurs, rubs, or gallops  ABDOMEN: Soft, Nontender, Nondistended; Bowel sounds present  EXTREMITIES:  2+ Peripheral Pulses, No clubbing, cyanosis, or edema  LYMPH: No lymphadenopathy noted  SKIN: No rashes or lesions    Care Discussed with Consultants/Other Providers [+ ] YES  [ ] NO
Patient is a 89y old  Male who presents with a chief complaint of Coffee ground emesis (11 Apr 2018 09:15)      INTERVAL HPI/OVERNIGHT EVENTS:  T(C): 37.1 (04-12-18 @ 14:30), Max: 37.1 (04-12-18 @ 14:30)  HR: 74 (04-12-18 @ 14:30) (67 - 79)  BP: 135/61 (04-12-18 @ 14:30) (123/76 - 136/69)  RR: 18 (04-12-18 @ 14:30) (18 - 20)  SpO2: 98% (04-12-18 @ 14:30) (94% - 98%)  Wt(kg): --  I&O's Summary      LABS:                        11.4   8.07  )-----------( 194      ( 11 Apr 2018 06:00 )             35.0     04-12    152<H>  |  119<H>  |  25<H>  ----------------------------<  132<H>  3.6   |  21<L>  |  1.45<H>    Ca    8.5      12 Apr 2018 05:35          CAPILLARY BLOOD GLUCOSE      POCT Blood Glucose.: 133 mg/dL (12 Apr 2018 16:32)  POCT Blood Glucose.: 125 mg/dL (12 Apr 2018 11:57)  POCT Blood Glucose.: 116 mg/dL (12 Apr 2018 06:11)  POCT Blood Glucose.: 105 mg/dL (11 Apr 2018 20:09)            MEDICATIONS  (STANDING):  ALBUTerol/ipratropium for Nebulization 3 milliLiter(s) Nebulizer every 6 hours  ammonium lactate 12% Lotion 1 Application(s) Topical two times a day  dextrose 5% with potassium chloride 20 mEq/L 1000 milliLiter(s) (100 mL/Hr) IV Continuous <Continuous>  dextrose 5%. 1000 milliLiter(s) (50 mL/Hr) IV Continuous <Continuous>  dextrose 50% Injectable 12.5 Gram(s) IV Push once  dextrose 50% Injectable 25 Gram(s) IV Push once  dextrose 50% Injectable 25 Gram(s) IV Push once  docusate sodium Liquid 100 milliGRAM(s) Oral two times a day  finasteride 5 milliGRAM(s) Oral daily  levothyroxine Injectable 50 MICROGram(s) IV Push at bedtime  pantoprazole  Injectable 40 milliGRAM(s) IV Push two times a day  polyethylene glycol 3350 17 Gram(s) Oral two times a day  senna 2 Tablet(s) Oral at bedtime  verapamil  milliGRAM(s) Oral daily    MEDICATIONS  (PRN):  dextrose Gel 1 Dose(s) Oral once PRN Blood Glucose LESS THAN 70 milliGRAM(s)/deciliter  glucagon  Injectable 1 milliGRAM(s) IntraMuscular once PRN Glucose LESS THAN 70 milligrams/deciliter          PHYSICAL EXAM:  GENERAL: frail  CHEST/LUNG: Clear to percussion bilaterally; No rales, rhonchi, wheezing, or rubs  HEART: Regular rate and rhythm; No murmurs, rubs, or gallops  ABDOMEN: Soft, Nontender, Nondistended; Bowel sounds present  EXTREMITIES:  2+ Peripheral Pulses, No clubbing, cyanosis, or edema  LYMPH: No lymphadenopathy noted  SKIN: No rashes or lesions    Care Discussed with Consultants/Other Providers [+ ] YES  [ ] NO
Patient is a 89y old  Male who presents with a chief complaint of Coffee ground emesis (11 Apr 2018 09:15)    Any change in ROS: awake, alert,  non verbal. no acute respiratory distress. on room air.     MEDICATIONS  (STANDING):  ALBUTerol/ipratropium for Nebulization 3 milliLiter(s) Nebulizer every 6 hours  ammonium lactate 12% Lotion 1 Application(s) Topical two times a day  dextrose 5% with potassium chloride 20 mEq/L 1000 milliLiter(s) (50 mL/Hr) IV Continuous <Continuous>  dextrose 5%. 1000 milliLiter(s) (50 mL/Hr) IV Continuous <Continuous>  dextrose 50% Injectable 12.5 Gram(s) IV Push once  dextrose 50% Injectable 25 Gram(s) IV Push once  dextrose 50% Injectable 25 Gram(s) IV Push once  docusate sodium Liquid 100 milliGRAM(s) Oral two times a day  finasteride 5 milliGRAM(s) Oral daily  levothyroxine Injectable 50 MICROGram(s) IV Push at bedtime  pantoprazole  Injectable 40 milliGRAM(s) IV Push two times a day  polyethylene glycol 3350 17 Gram(s) Oral two times a day  senna 2 Tablet(s) Oral at bedtime  verapamil  milliGRAM(s) Oral daily    MEDICATIONS  (PRN):  dextrose Gel 1 Dose(s) Oral once PRN Blood Glucose LESS THAN 70 milliGRAM(s)/deciliter  glucagon  Injectable 1 milliGRAM(s) IntraMuscular once PRN Glucose LESS THAN 70 milligrams/deciliter    Vital Signs Last 24 Hrs  T(C): 36.6 (14 Apr 2018 10:03), Max: 36.9 (13 Apr 2018 17:07)  T(F): 97.9 (14 Apr 2018 10:03), Max: 98.5 (13 Apr 2018 17:07)  HR: 70 (14 Apr 2018 10:03) (69 - 95)  BP: 115/54 (14 Apr 2018 10:03) (112/56 - 122/62)  BP(mean): --  RR: 18 (14 Apr 2018 10:03) (17 - 20)  SpO2: 96% (14 Apr 2018 10:03) (95% - 100%)    I&O's Summary    13 Apr 2018 07:01  -  14 Apr 2018 07:00  --------------------------------------------------------  IN: 1200 mL / OUT: 0 mL / NET: 1200 mL    Physical Exam:   General: NAD, well developed, well nourished.   Eyes: PERRL, EOM intact; conjunctiva and sclera clear  Head: Normocephalic, atraumatic  ENMT: No nasal discharge, airway clear  Neck: Supple, non tender,  no masses, no JVD  Respiratory: Clear to auscultation bilaterally without wheezing, rhonchi or rales  Cardiovascular: Regular rate and rhythm, no murmurs.  Gastrointestinal: Soft non-tender non-distended, positive bowel sounds  Extremities: Normal range of motion, no clubbing, cyanosis or edema  Vascular: Peripheral pulses palpable 2+ bilaterally  Neurological: Alert, awake, non-verbal.   Skin: Warm and dry. No obvious rash  Lymph Nodes: No acute cervical or supraclavicular adenopathy  Musculoskeletal: kyphosis (- ), Move all extremities,   Psychiatric: Calm     Labs:                   11.4   8.07  )-----------( 194      ( 11 Apr 2018 06:00 )             35.0     04-14    139  |  107  |  19  ----------------------------<  110<H>  3.8   |  21<L>  |  1.25  04-13    147<H>  |  114<H>  |  24<H>  ----------------------------<  110<H>  3.7   |  23  |  1.36<H>  04-12    152<H>  |  119<H>  |  25<H>  ----------------------------<  132<H>  3.6   |  21<L>  |  1.45<H>  04-11    152<H>  |  116<H>  |  28<H>  ----------------------------<  86  3.7   |  23  |  1.40<H>    Ca    8.5      14 Apr 2018 06:30  Ca    8.4      13 Apr 2018 06:00  Phos  2.1     04-13  Mg     1.9     04-13      CAPILLARY BLOOD GLUCOSE      POCT Blood Glucose.: 111 mg/dL (14 Apr 2018 07:40)  POCT Blood Glucose.: 118 mg/dL (13 Apr 2018 16:37)  POCT Blood Glucose.: 126 mg/dL (13 Apr 2018 11:48)      Cultures:     Studies  Chest X-RAY < from: Xray Chest 1 View AP/PA (04.10.18 @ 14:46) >  EXAM:  XR CHEST AP OR PA 1V        *** ADDENDUM 04/10/2018  ***    Cholelithiasis is seen in the right upper quadrant of the abdomen.      *** END OF ADDENDUM 04/10/2018  ***      PROCEDURE DATE:  Apr 10 2018         INTERPRETATION:  TIME OF EXAM: April 10, 2018 at 2:25 PM.    CLINICAL INFORMATION: Cough. Secretions. Congestion.    COMPARISON:  April 4, 2018 at 3:40 PM. Included portions of the lower   chest on CT scan of the abdomen and pelvis from April 4, 2018.    TECHNIQUE:   AP Portable chest x-ray.    INTERPRETATION:     Heart size and the mediastinum cannot be accurately evaluated on this   projection.  Left chest wall cardiac pacemaker again seen.  A calcified tortuous thoracic aorta is again noted.  There is a calcified linear scarin the right apex. Right apical bullous   change is suggested. There is linear scar versus atelectasis in the right   midlung. Bibasilar reticular opacities are again seen.  No pleural effusion or pneumothorax is seen.              IMPRESSION:  Calcified linear scar in the right apex which suggestion of   right apical bullous change.    Linear scar versus atelectasis in the right midlung.    Bibasilar reticular opacities are seen secondary to atelectasis and   fibrosis seen on the CT scan.        ***Please see the addendum at the top of this report. It may contain   additional important information or changes.****      < end of copied text >    CT SCAN Chest   Venous Dopplers: LE:   CT Abdomen  Others  < from: Transthoracic Echocardiogram (04.10.18 @ 16:19) >    Patient name: ALYSSA MONTAÑO  YOB: 1927   Age: 89 (M)   MR#: [id number]  Study Date: 4/10/2018  Location: 750A Sonographer: Lauren Finkelstein  Study quality: Technically Difficult  Referring Physician: Kelton Low MD  Blood Pressure: 140/84 mmHg  Height: 170 cm  Weight: 64 kg  BSA: 1.7 m2  ------------------------------------------------------------------------  PROCEDURE: Transthoracic echocardiogram with 2-D, M-Mode  and complete spectral and color flow Doppler.  INDICATION: Unspecified atrial fibrillation (I48.91)  ------------------------------------------------------------------------  OBSERVATIONS:  Mitral Valve: Mitral annular calcification, otherwise  normal mitral valve. Mild mitral regurgitation.  Aortic Root:Normal aortic root.  Aortic Valve: Calcified trileaflet aortic valve with normal  opening.  Left Atrium: Moderately dilated left atrium.  LA volume  index = 47 cc/m2.  Left Ventricle: Endocardium not well visualized; grossly  normal left ventricular systolic function.  Right Heart: Normal right atrium. Normal right ventricular  size and function. Normal tricuspid valve. Mild tricuspid  regurgitation. Normal pulmonic valve.  Pericardium/PleuraNormal pericardium with no pericardial  effusion.  Hemodynamic: Estimated right ventricular systolic pressure  equals 40 mm Hg, assuming right atrial pressure equals 10  mm Hg, consistent with mild pulmonary hypertension.  ------------------------------------------------------------------------  CONCLUSIONS:  1. Mitral annular calcification, otherwise normal mitral  valve. Mild mitral regurgitation.  2. Moderately dilated left atrium.  LA volume index = 47  cc/m2.  3. Endocardium not well visualized; grossly normal left  ventricular systolic function.  4. Normal right ventricular size and function.  5. Estimated right ventricular systolic pressure equals 40  mm Hg, assuming right atrial pressure equals 10 mm Hg,  consistent with mild pulmonary hypertension.    < end of copied text >
Patient is a 89y old  Male who presents with a chief complaint of GI  BLEED, dehydration, Coffee Ground Emesis, RICARDO, (04 Apr 2018 20:22)      INTERVAL HPI/OVERNIGHT EVENTS:  T(C): 36.3 (04-07-18 @ 14:48), Max: 36.8 (04-06-18 @ 21:22)  HR: 69 (04-07-18 @ 14:48) (69 - 70)  BP: 144/76 (04-07-18 @ 14:48) (144/76 - 164/97)  RR: 18 (04-07-18 @ 14:48) (18 - 18)  SpO2: 99% (04-07-18 @ 14:48) (98% - 99%)  Wt(kg): --  I&O's Summary      LABS:                        11.2   4.46  )-----------( 198      ( 07 Apr 2018 07:19 )             35.3     04-07    149<H>  |  113<H>  |  35<H>  ----------------------------<  122<H>  4.1   |  25  |  1.51<H>    Ca    9.1      07 Apr 2018 07:19  Mg     2.3     04-06          CAPILLARY BLOOD GLUCOSE                MEDICATIONS  (STANDING):  ammonium lactate 12% Lotion 1 Application(s) Topical two times a day  docusate sodium Liquid 100 milliGRAM(s) Oral two times a day  finasteride 5 milliGRAM(s) Oral daily  lactated ringers. 1000 milliLiter(s) (75 mL/Hr) IV Continuous <Continuous>  levothyroxine 100 MICROGram(s) Oral daily  pantoprazole Infusion 8 mG/Hr (10 mL/Hr) IV Continuous <Continuous>  polyethylene glycol 3350 17 Gram(s) Oral two times a day  senna 2 Tablet(s) Oral at bedtime  verapamil  milliGRAM(s) Oral daily    MEDICATIONS  (PRN):          PHYSICAL EXAM:  GENERAL: NAD, well-groomed, well-developed  HEAD:  Atraumatic, Normocephalic  CHEST/LUNG: Clear to percussion bilaterally; No rales, rhonchi, wheezing, or rubs  HEART: Regular rate and rhythm; No murmurs, rubs, or gallops  ABDOMEN: Soft, Nontender, Nondistended; Bowel sounds present  EXTREMITIES:  2+ Peripheral Pulses, No clubbing, cyanosis, or edema  LYMPH: No lymphadenopathy noted  SKIN: No rashes or lesions    Care Discussed with Consultants/Other Providers [ +] YES  [ ] NO
Patient is a 89y old  Male who presents with a chief complaint of GI  BLEED, dehydration, Coffee Ground Emesis, RICARDO, (04 Apr 2018 20:22)      INTERVAL HPI/OVERNIGHT EVENTS:  T(C): 36.6 (04-10-18 @ 17:31), Max: 37.1 (04-10-18 @ 01:31)  HR: 70 (04-10-18 @ 14:25) (70 - 74)  BP: 129/79 (04-10-18 @ 17:31) (127/75 - 158/71)  RR: 18 (04-10-18 @ 17:31) (18 - 19)  SpO2: 94% (04-10-18 @ 17:31) (94% - 100%)  Wt(kg): --  I&O's Summary    09 Apr 2018 07:01  -  10 Apr 2018 07:00  --------------------------------------------------------  IN: 300 mL / OUT: 2 mL / NET: 298 mL        LABS:                        9.7    7.41  )-----------( 184      ( 10 Apr 2018 06:24 )             31.4     04-10    135  |  104  |  24<H>  ----------------------------<  524<HH>  5.7<H>   |  23  |  1.28    Ca    7.7<L>      10 Apr 2018 09:13          CAPILLARY BLOOD GLUCOSE      POCT Blood Glucose.: 101 mg/dL (10 Apr 2018 12:09)            MEDICATIONS  (STANDING):  ammonium lactate 12% Lotion 1 Application(s) Topical two times a day  dextrose 5%. 1000 milliLiter(s) (50 mL/Hr) IV Continuous <Continuous>  dextrose 50% Injectable 12.5 Gram(s) IV Push once  dextrose 50% Injectable 25 Gram(s) IV Push once  dextrose 50% Injectable 25 Gram(s) IV Push once  docusate sodium Liquid 100 milliGRAM(s) Oral two times a day  finasteride 5 milliGRAM(s) Oral daily  lactated ringers. 1000 milliLiter(s) (75 mL/Hr) IV Continuous <Continuous>  levothyroxine 100 MICROGram(s) Oral daily  pantoprazole Infusion 8 mG/Hr (10 mL/Hr) IV Continuous <Continuous>  polyethylene glycol 3350 17 Gram(s) Oral two times a day  senna 2 Tablet(s) Oral at bedtime  verapamil  milliGRAM(s) Oral daily    MEDICATIONS  (PRN):  dextrose Gel 1 Dose(s) Oral once PRN Blood Glucose LESS THAN 70 milliGRAM(s)/deciliter  glucagon  Injectable 1 milliGRAM(s) IntraMuscular once PRN Glucose LESS THAN 70 milligrams/deciliter          PHYSICAL EXAM:  GENERAL: NAD, well-groomed, well-developed  HEAD:  Atraumatic, Normocephalic  CHEST/LUNG: Clear to percussion bilaterally; No rales, rhonchi, wheezing, or rubs  HEART: Regular rate and rhythm; No murmurs, rubs, or gallops  ABDOMEN: Soft, Nontender, Nondistended; Bowel sounds present  EXTREMITIES:  2+ Peripheral Pulses, No clubbing, cyanosis, or edema  LYMPH: No lymphadenopathy noted  SKIN: No rashes or lesions    Care Discussed with Consultants/Other Providers [ ] YES  [ ] NO
Patient is a 89y old  Male who presents with a chief complaint of GI  BLEED, dehydration, Coffee Ground Emesis, RICARDO, (04 Apr 2018 20:22)      INTERVAL HPI/OVERNIGHT EVENTS:  T(C): 37.1 (04-08-18 @ 19:30), Max: 37.1 (04-08-18 @ 19:30)  HR: 70 (04-08-18 @ 19:30) (68 - 70)  BP: 157/80 (04-08-18 @ 19:30) (146/78 - 166/52)  RR: 18 (04-08-18 @ 19:30) (18 - 18)  SpO2: 98% (04-08-18 @ 19:30) (98% - 99%)  Wt(kg): --  I&O's Summary      LABS:                        12.3   6.63  )-----------( 221      ( 08 Apr 2018 10:22 )             38.6     04-08    148<H>  |  112<H>  |  28<H>  ----------------------------<  146<H>  4.2   |  23  |  1.58<H>    Ca    9.2      08 Apr 2018 06:08          CAPILLARY BLOOD GLUCOSE                MEDICATIONS  (STANDING):  ammonium lactate 12% Lotion 1 Application(s) Topical two times a day  docusate sodium Liquid 100 milliGRAM(s) Oral two times a day  finasteride 5 milliGRAM(s) Oral daily  lactated ringers. 1000 milliLiter(s) (75 mL/Hr) IV Continuous <Continuous>  levothyroxine 100 MICROGram(s) Oral daily  pantoprazole Infusion 8 mG/Hr (10 mL/Hr) IV Continuous <Continuous>  polyethylene glycol 3350 17 Gram(s) Oral two times a day  senna 2 Tablet(s) Oral at bedtime  verapamil  milliGRAM(s) Oral daily    MEDICATIONS  (PRN):          PHYSICAL EXAM:  GENERAL: NAD, well-groomed, well-developed  HEAD:  Atraumatic, Normocephalic  CHEST/LUNG: Clear to percussion bilaterally; No rales, rhonchi, wheezing, or rubs  HEART: Regular rate and rhythm; No murmurs, rubs, or gallops  ABDOMEN: Soft, Nontender, Nondistended; Bowel sounds present  EXTREMITIES:  2+ Peripheral Pulses, No clubbing, cyanosis, or edema  LYMPH: No lymphadenopathy noted  SKIN: No rashes or lesions    Care Discussed with Consultants/Other Providers [ ] YES  [ ] NO
Patient is a 89y old  Male who presents with a chief complaint of GI  BLEED, dehydration, Coffee Ground Emesis, RICARDO, (04 Apr 2018 20:22)      INTERVAL HPI/OVERNIGHT EVENTS:  T(C): 37.1 (04-09-18 @ 17:23), Max: 37.3 (04-08-18 @ 22:46)  HR: 70 (04-09-18 @ 17:23) (70 - 72)  BP: 152/85 (04-09-18 @ 17:23) (139/76 - 157/80)  RR: 18 (04-09-18 @ 17:23) (18 - 18)  SpO2: 96% (04-09-18 @ 17:23) (93% - 100%)  Wt(kg): --  I&O's Summary    08 Apr 2018 07:01  -  09 Apr 2018 07:00  --------------------------------------------------------  IN: 0 mL / OUT: 0 mL / NET: 0 mL    09 Apr 2018 07:01  -  09 Apr 2018 18:42  --------------------------------------------------------  IN: 300 mL / OUT: 2 mL / NET: 298 mL        LABS:                        12.1   6.62  )-----------( 227      ( 09 Apr 2018 07:20 )             38.0     04-09    153<H>  |  117<H>  |  28<H>  ----------------------------<  139<H>  3.8   |  24  |  1.48<H>    Ca    9.0      09 Apr 2018 07:20          CAPILLARY BLOOD GLUCOSE                MEDICATIONS  (STANDING):  ammonium lactate 12% Lotion 1 Application(s) Topical two times a day  dextrose 5% with potassium chloride 20 mEq/L 1000 milliLiter(s) (75 mL/Hr) IV Continuous <Continuous>  docusate sodium Liquid 100 milliGRAM(s) Oral two times a day  finasteride 5 milliGRAM(s) Oral daily  lactated ringers. 1000 milliLiter(s) (75 mL/Hr) IV Continuous <Continuous>  levothyroxine 100 MICROGram(s) Oral daily  pantoprazole Infusion 8 mG/Hr (10 mL/Hr) IV Continuous <Continuous>  polyethylene glycol 3350 17 Gram(s) Oral two times a day  senna 2 Tablet(s) Oral at bedtime  verapamil  milliGRAM(s) Oral daily    MEDICATIONS  (PRN):          PHYSICAL EXAM:  GENERAL: NAD, well-groomed, well-developed  HEAD:  Atraumatic, Normocephalic  CHEST/LUNG: Clear to percussion bilaterally; No rales, rhonchi, wheezing, or rubs  HEART: Regular rate and rhythm; No murmurs, rubs, or gallops  ABDOMEN: Soft, Nontender, Nondistended; Bowel sounds present  EXTREMITIES:  2+ Peripheral Pulses, No clubbing, cyanosis, or edema  LYMPH: No lymphadenopathy noted  SKIN: No rashes or lesions    Care Discussed with Consultants/Other Providers [+ ] YES  [ ] NO
Patient is a 89y old  Male who presents with a chief complaint of GI  BLEED, dehydration, Coffee Ground Emesis, RICARDO, (2018 20:22)      INTERVAL HPI/OVERNIGHT EVENTS:  T(C): 36.3 (18 @ 12:19), Max: 36.4 (18 @ 05:39)  HR: 84 (18 @ 12:19) (69 - 84)  BP: 133/76 (18 @ 12:19) (114/67 - 135/79)  RR: 19 (18 @ 12:19) (18 - 19)  SpO2: 95% (18 @ 12:19) (95% - 97%)  Wt(kg): --  I&O's Summary    2018 07:01  -  2018 07:00  --------------------------------------------------------  IN: 0 mL / OUT: 1 mL / NET: -1 mL        LABS:                        11.4   5.08  )-----------( 181      ( 2018 06:30 )             35.8     04-06    146<H>  |  111<H>  |  44<H>  ----------------------------<  77  4.2   |  20<L>  |  1.53<H>    Ca    8.5      2018 06:30  Phos  3.6     04-05  Mg     2.3     -06    TPro  6.3  /  Alb  3.4  /  TBili  1.1  /  DBili  x   /  AST  14  /  ALT  11  /  AlkPhos  55  04-05      Urinalysis Basic - ( 2018 19:30 )    Color: YELLOW / Appearance: CLEAR / S.026 / pH: 5.5  Gluc: NEGATIVE / Ketone: NEGATIVE  / Bili: NEGATIVE / Urobili: NORMAL mg/dL   Blood: LARGE / Protein: 100 mg/dL / Nitrite: NEGATIVE   Leuk Esterase: NEGATIVE / RBC: 10-25 / WBC 5-10   Sq Epi: x / Non Sq Epi: x / Bacteria: FEW      CAPILLARY BLOOD GLUCOSE            Urinalysis Basic - ( 2018 19:30 )    Color: YELLOW / Appearance: CLEAR / S.026 / pH: 5.5  Gluc: NEGATIVE / Ketone: NEGATIVE  / Bili: NEGATIVE / Urobili: NORMAL mg/dL   Blood: LARGE / Protein: 100 mg/dL / Nitrite: NEGATIVE   Leuk Esterase: NEGATIVE / RBC: 10-25 / WBC 5-10   Sq Epi: x / Non Sq Epi: x / Bacteria: FEW        MEDICATIONS  (STANDING):  ammonium lactate 12% Lotion 1 Application(s) Topical two times a day  docusate sodium Liquid 100 milliGRAM(s) Oral two times a day  finasteride 5 milliGRAM(s) Oral daily  lactated ringers. 1000 milliLiter(s) (75 mL/Hr) IV Continuous <Continuous>  levothyroxine 100 MICROGram(s) Oral daily  pantoprazole Infusion 8 mG/Hr (10 mL/Hr) IV Continuous <Continuous>  polyethylene glycol 3350 17 Gram(s) Oral two times a day  senna 2 Tablet(s) Oral at bedtime  verapamil  milliGRAM(s) Oral daily    MEDICATIONS  (PRN):          PHYSICAL EXAM:  GENERAL: NAD, well-groomed, well-developed  HEAD:  Atraumatic, Normocephalic  CHEST/LUNG: Clear to percussion bilaterally; No rales, rhonchi, wheezing, or rubs  HEART: Regular rate and rhythm; No murmurs, rubs, or gallops  ABDOMEN: Soft, Nontender, Nondistended; Bowel sounds present  EXTREMITIES:  2+ Peripheral Pulses, No clubbing, cyanosis, or edema  LYMPH: No lymphadenopathy noted  SKIN: No rashes or lesions    Care Discussed with Consultants/Other Providers [ ] YES  [ ] NO
Salo Alvarez MD  Interventional Cardiology  [place] Office : [address] [place] [address]  Tel:   [place] Office : [address] [facility] [place] [address]  Tel: [phone]  Cell : [phone]    Subjective : Pt lying in bed comfortable, not in distress, denies any chest pain or SOB  	  MEDICATIONS:  verapamil  milliGRAM(s) Oral daily          docusate sodium Liquid 100 milliGRAM(s) Oral two times a day  pantoprazole Infusion 8 mG/Hr IV Continuous <Continuous>  polyethylene glycol 3350 17 Gram(s) Oral two times a day  senna 2 Tablet(s) Oral at bedtime    dextrose 50% Injectable 12.5 Gram(s) IV Push once  dextrose 50% Injectable 25 Gram(s) IV Push once  dextrose 50% Injectable 25 Gram(s) IV Push once  dextrose Gel 1 Dose(s) Oral once PRN  finasteride 5 milliGRAM(s) Oral daily  glucagon  Injectable 1 milliGRAM(s) IntraMuscular once PRN  levothyroxine 100 MICROGram(s) Oral daily    ammonium lactate 12% Lotion 1 Application(s) Topical two times a day  dextrose 5% + sodium chloride 0.45%. 1000 milliLiter(s) IV Continuous <Continuous>  dextrose 5%. 1000 milliLiter(s) IV Continuous <Continuous>      PHYSICAL EXAM:  T(C): 36.9 (04-11-18 @ 10:40), Max: 37.2 (04-11-18 @ 07:45)  HR: 70 (04-11-18 @ 10:40) (70 - 76)  BP: 131/70 (04-11-18 @ 10:40) (129/79 - 141/62)  RR: 18 (04-11-18 @ 10:40) (18 - 18)  SpO2: 95% (04-11-18 @ 10:40) (94% - 100%)  Wt(kg): --  I&O's Summary        Appearance: Normal	  HEENT:   Normal oral mucosa, PERRL, EOMI	  Cardiovascular: Normal S1 S2, No JVD, No murmurs, No edema  Respiratory: Lungs clear to auscultation	  Gastrointestinal:  Soft, Non-tender, + BS	  Extremities: Normal range of motion, No clubbing, cyanosis or edema                                    11.4   8.07  )-----------( 194      ( 11 Apr 2018 06:00 )             35.0     04-11    152<H>  |  116<H>  |  28<H>  ----------------------------<  86  3.7   |  23  |  1.40<H>    Ca    8.9      11 Apr 2018 06:00      proBNP:   Lipid Profile:   HgA1c: Hemoglobin A1C, Whole Blood: 5.4 % (04-11 @ 06:00)    TSH:
Salo Alvarez MD  Interventional Cardiology  [place] Office : [address] [place] [address] [address] [address]  Tel:   [place] Office : [address] [facility] [place] [address]  Tel: [phone]  Cell : [phone]    Subjective : Pt lying in bed comfortable, not in distress  	  MEDICATIONS:  verapamil  milliGRAM(s) Oral daily  docusate sodium Liquid 100 milliGRAM(s) Oral two times a day  pantoprazole Infusion 8 mG/Hr IV Continuous <Continuous>  polyethylene glycol 3350 17 Gram(s) Oral two times a day  senna 2 Tablet(s) Oral at bedtime  finasteride 5 milliGRAM(s) Oral daily  levothyroxine 100 MICROGram(s) Oral daily  ammonium lactate 12% Lotion 1 Application(s) Topical two times a day  lactated ringers. 1000 milliLiter(s) IV Continuous <Continuous>      PHYSICAL EXAM:  T(C): 36.8 (04-09-18 @ 10:53), Max: 37.3 (04-08-18 @ 22:46)  HR: 72 (04-09-18 @ 10:53) (69 - 72)  BP: 139/76 (04-09-18 @ 10:53) (139/76 - 157/80)  RR: 18 (04-09-18 @ 10:53) (18 - 18)  SpO2: 95% (04-09-18 @ 10:53) (93% - 100%)  Wt(kg): --  I&O's Summary    08 Apr 2018 07:01  -  09 Apr 2018 07:00  --------------------------------------------------------  IN: 0 mL / OUT: 0 mL / NET: 0 mL          Appearance: Normal	  HEENT:   Normal oral mucosa, PERRL, EOMI	  Cardiovascular: Normal S1 S2, No JVD, No murmurs, No edema  Respiratory: Lungs clear to auscultation	  Gastrointestinal:  Soft, Non-tender, + BS	  Extremities: Normal range of motion, No clubbing, cyanosis or edema                                    12.1   6.62  )-----------( 227      ( 09 Apr 2018 07:20 )             38.0     04-09    153<H>  |  117<H>  |  28<H>  ----------------------------<  139<H>  3.8   |  24  |  1.48<H>    Ca    9.0      09 Apr 2018 07:20      proBNP:   Lipid Profile:   HgA1c:   TSH:
Salo Alvarez MD  Interventional Cardiology  [place] Office : [address] [place] [address] [address]  Tel:   [place] Office : [address] [facility] [place] [address]  Tel: [phone]  Cell : [phone]    Subjective : Pt lying in bed comfortable, not in distress, denies any chest pain or SOB  	  MEDICATIONS:  verapamil  milliGRAM(s) Oral daily  docusate sodium Liquid 100 milliGRAM(s) Oral two times a day  pantoprazole Infusion 8 mG/Hr IV Continuous <Continuous>  polyethylene glycol 3350 17 Gram(s) Oral two times a day  senna 2 Tablet(s) Oral at bedtime  dextrose 50% Injectable 12.5 Gram(s) IV Push once  dextrose 50% Injectable 25 Gram(s) IV Push once  dextrose 50% Injectable 25 Gram(s) IV Push once  dextrose Gel 1 Dose(s) Oral once PRN  finasteride 5 milliGRAM(s) Oral daily  glucagon  Injectable 1 milliGRAM(s) IntraMuscular once PRN  levothyroxine 100 MICROGram(s) Oral daily    ammonium lactate 12% Lotion 1 Application(s) Topical two times a day  dextrose 5%. 1000 milliLiter(s) IV Continuous <Continuous>  lactated ringers. 1000 milliLiter(s) IV Continuous <Continuous>      PHYSICAL EXAM:  T(C): 36.8 (04-10-18 @ 10:11), Max: 37.1 (04-09-18 @ 17:23)  HR: 73 (04-10-18 @ 10:11) (70 - 74)  BP: 140/68 (04-10-18 @ 10:11) (127/75 - 158/71)  RR: 18 (04-10-18 @ 10:11) (18 - 19)  SpO2: 99% (04-10-18 @ 10:11) (96% - 100%)  Wt(kg): --  I&O's Summary    09 Apr 2018 07:01  -  10 Apr 2018 07:00  --------------------------------------------------------  IN: 300 mL / OUT: 2 mL / NET: 298 mL          Appearance: Normal	  HEENT:   Normal oral mucosa, PERRL, EOMI	  Cardiovascular: Normal S1 S2, No JVD, No murmurs, No edema  Respiratory: Lungs clear to auscultation	  Gastrointestinal:  Soft, Non-tender, + BS	  Extremities: Normal range of motion, No clubbing, cyanosis or edema                                    9.7    7.41  )-----------( 184      ( 10 Apr 2018 06:24 )             31.4     04-10    135  |  104  |  24<H>  ----------------------------<  524<HH>  5.7<H>   |  23  |  1.28    Ca    7.7<L>      10 Apr 2018 09:13      proBNP:   Lipid Profile:   HgA1c:   TSH:
Salo Alvarez MD  Interventional Cardiology  [place] Office : [address] [place] [address] [address]. [address]  Tel:   [place] Office : [address] [facility] [place]. [address]  Tel: [phone]  Cell : [phone]    Subjective : Pt lying in bed comfortable, not in distress, denies any chest pain or SOB  	  MEDICATIONS:  verapamil  milliGRAM(s) Oral daily  docusate sodium Liquid 100 milliGRAM(s) Oral two times a day  pantoprazole Infusion 8 mG/Hr IV Continuous <Continuous>  polyethylene glycol 3350 17 Gram(s) Oral two times a day  senna 2 Tablet(s) Oral at bedtime  finasteride 5 milliGRAM(s) Oral daily  levothyroxine 100 MICROGram(s) Oral daily  ammonium lactate 12% Lotion 1 Application(s) Topical two times a day  dextrose 5% + sodium chloride 0.9%. 1000 milliLiter(s) IV Continuous <Continuous>  lactated ringers. 1000 milliLiter(s) IV Continuous <Continuous>    PHYSICAL EXAM:  T(C): 36.8 (04-06-18 @ 21:22), Max: 36.8 (04-06-18 @ 21:22)  HR: 69 (04-06-18 @ 21:22) (69 - 84)  BP: 150/79 (04-06-18 @ 21:22) (114/67 - 150/79)  RR: 18 (04-06-18 @ 21:22) (18 - 19)  SpO2: 98% (04-06-18 @ 21:22) (95% - 98%)  Wt(kg): --  I&O's Summary    05 Apr 2018 07:01  -  06 Apr 2018 07:00  --------------------------------------------------------  IN: 0 mL / OUT: 1 mL / NET: -1 mL          Appearance: Normal	  HEENT:   Normal oral mucosa, PERRL, EOMI	  Cardiovascular: Normal S1 S2, No JVD, No murmurs, No edema  Respiratory: Lungs clear to auscultation	  Gastrointestinal:  Soft, Non-tender, + BS	  Extremities: Normal range of motion, No clubbing, cyanosis or edema                                    11.4   5.08  )-----------( 181      ( 06 Apr 2018 06:30 )             35.8     04-06    146<H>  |  111<H>  |  44<H>  ----------------------------<  77  4.2   |  20<L>  |  1.53<H>    Ca    8.5      06 Apr 2018 06:30  Phos  3.6     04-05  Mg     2.3     04-06    TPro  6.3  /  Alb  3.4  /  TBili  1.1  /  DBili  x   /  AST  14  /  ALT  11  /  AlkPhos  55  04-05    proBNP:   Lipid Profile:   HgA1c:   TSH:
Salo Alvarez MD  Interventional Cardiology  [place] Office : [address] [place] [address]  Tel:   [place] Office : [address] [facility] [place]. [address]  Tel: [phone]  Cell : [phone]    Subjective : Pt lying in bed comfortable, not in distress, denies any chest pain or SOB  	  MEDICATIONS:  verapamil  milliGRAM(s) Oral daily          docusate sodium Liquid 100 milliGRAM(s) Oral two times a day  pantoprazole Infusion 8 mG/Hr IV Continuous <Continuous>  polyethylene glycol 3350 17 Gram(s) Oral two times a day  senna 2 Tablet(s) Oral at bedtime    finasteride 5 milliGRAM(s) Oral daily  levothyroxine 100 MICROGram(s) Oral daily    ammonium lactate 12% Lotion 1 Application(s) Topical two times a day  lactated ringers. 1000 milliLiter(s) IV Continuous <Continuous>      PHYSICAL EXAM:  T(C): 36.3 (04-07-18 @ 14:48), Max: 36.8 (04-06-18 @ 21:22)  HR: 69 (04-07-18 @ 14:48) (69 - 70)  BP: 144/76 (04-07-18 @ 14:48) (144/76 - 164/97)  RR: 18 (04-07-18 @ 14:48) (18 - 18)  SpO2: 99% (04-07-18 @ 14:48) (98% - 99%)  Wt(kg): --  I&O's Summary        Appearance: Normal	  HEENT:   Normal oral mucosa, PERRL, EOMI	  Cardiovascular: Normal S1 S2, No JVD, No murmurs, No edema  Respiratory: Lungs clear to auscultation	  Gastrointestinal:  Soft, Non-tender, + BS	  Extremities: Normal range of motion, No clubbing, cyanosis or edema                                    11.2   4.46  )-----------( 198      ( 07 Apr 2018 07:19 )             35.3     04-07    149<H>  |  113<H>  |  35<H>  ----------------------------<  122<H>  4.1   |  25  |  1.51<H>    Ca    9.1      07 Apr 2018 07:19  Mg     2.3     04-06      proBNP:   Lipid Profile:   HgA1c:   TSH:
Salo Alvarez MD  Interventional Cardiology  [place] Office : [address] [place] [address] [facility] [address]  Tel:   [place] Office : [address] [facility] [place] [address]  Tel: [phone]  Cell : [phone]    Subjective : Pt lying in bed comfortable, not in distress, denies any chest pain or SOB  	  MEDICATIONS:  verapamil  milliGRAM(s) Oral daily      ALBUTerol/ipratropium for Nebulization 3 milliLiter(s) Nebulizer every 6 hours      docusate sodium Liquid 100 milliGRAM(s) Oral two times a day  pantoprazole  Injectable 40 milliGRAM(s) IV Push two times a day  polyethylene glycol 3350 17 Gram(s) Oral two times a day  senna 2 Tablet(s) Oral at bedtime    dextrose 50% Injectable 12.5 Gram(s) IV Push once  dextrose 50% Injectable 25 Gram(s) IV Push once  dextrose 50% Injectable 25 Gram(s) IV Push once  dextrose Gel 1 Dose(s) Oral once PRN  finasteride 5 milliGRAM(s) Oral daily  glucagon  Injectable 1 milliGRAM(s) IntraMuscular once PRN  levothyroxine Injectable 50 MICROGram(s) IV Push at bedtime    ammonium lactate 12% Lotion 1 Application(s) Topical two times a day  dextrose 5% with potassium chloride 20 mEq/L 1000 milliLiter(s) IV Continuous <Continuous>  dextrose 5%. 1000 milliLiter(s) IV Continuous <Continuous>      PHYSICAL EXAM:  T(C): 36.7 (04-12-18 @ 17:36), Max: 37.1 (04-12-18 @ 14:30)  HR: 71 (04-12-18 @ 20:54) (67 - 79)  BP: 132/66 (04-12-18 @ 17:36) (129/66 - 136/69)  RR: 18 (04-12-18 @ 17:36) (18 - 20)  SpO2: 96% (04-12-18 @ 20:54) (94% - 98%)  Wt(kg): --  I&O's Summary    12 Apr 2018 07:01  -  12 Apr 2018 22:27  --------------------------------------------------------  IN: 400 mL / OUT: 0 mL / NET: 400 mL          Appearance: Normal	  HEENT:   Normal oral mucosa, PERRL, EOMI	  Cardiovascular: Normal S1 S2, No JVD, No murmurs, No edema  Respiratory: Lungs clear to auscultation	  Gastrointestinal:  Soft, Non-tender, + BS	  Extremities: Normal range of motion, No clubbing, cyanosis or edema                                    11.4   8.07  )-----------( 194      ( 11 Apr 2018 06:00 )             35.0     04-12    152<H>  |  119<H>  |  25<H>  ----------------------------<  132<H>  3.6   |  21<L>  |  1.45<H>    Ca    8.5      12 Apr 2018 05:35      proBNP:   Lipid Profile:   HgA1c:   TSH:
Salo Alvarez MD  Interventional Cardiology  [place] Office : [address] [place] [address]  Tel:   [place] Office : [address] [facility] [place] [address]  Tel: [phone]  Cell : [phone]    Subjective : Pt lying in bed comfortable, not in distress, denies any chest pain or SOB  	  MEDICATIONS:  verapamil  milliGRAM(s) Oral daily      ALBUTerol/ipratropium for Nebulization 3 milliLiter(s) Nebulizer every 6 hours      docusate sodium Liquid 100 milliGRAM(s) Oral two times a day  pantoprazole  Injectable 40 milliGRAM(s) IV Push two times a day  polyethylene glycol 3350 17 Gram(s) Oral two times a day  senna 2 Tablet(s) Oral at bedtime    dextrose 50% Injectable 12.5 Gram(s) IV Push once  dextrose 50% Injectable 25 Gram(s) IV Push once  dextrose 50% Injectable 25 Gram(s) IV Push once  dextrose Gel 1 Dose(s) Oral once PRN  finasteride 5 milliGRAM(s) Oral daily  glucagon  Injectable 1 milliGRAM(s) IntraMuscular once PRN  levothyroxine Injectable 50 MICROGram(s) IV Push at bedtime    ammonium lactate 12% Lotion 1 Application(s) Topical two times a day  dextrose 5% with potassium chloride 20 mEq/L 1000 milliLiter(s) IV Continuous <Continuous>  dextrose 5%. 1000 milliLiter(s) IV Continuous <Continuous>      PHYSICAL EXAM:  T(C): 36.9 (04-14-18 @ 05:20), Max: 36.9 (04-13-18 @ 17:07)  HR: 69 (04-14-18 @ 05:20) (69 - 95)  BP: 116/60 (04-14-18 @ 05:20) (112/56 - 122/62)  RR: 18 (04-14-18 @ 05:20) (17 - 20)  SpO2: 100% (04-14-18 @ 05:20) (95% - 100%)  Wt(kg): --  I&O's Summary    13 Apr 2018 07:01  -  14 Apr 2018 07:00  --------------------------------------------------------  IN: 1200 mL / OUT: 0 mL / NET: 1200 mL          Appearance: Normal	  HEENT:   Normal oral mucosa, PERRL, EOMI	  Cardiovascular: Normal S1 S2, No JVD, No murmurs, No edema  Respiratory: b/l rhonchi  Gastrointestinal:  Soft, Non-tender, + BS	  Extremities: Normal range of motion, No clubbing, cyanosis or edema                04-14    139  |  107  |  19  ----------------------------<  110<H>  3.8   |  21<L>  |  1.25    Ca    8.5      14 Apr 2018 06:30  Phos  2.1     04-13  Mg     1.9     04-13      proBNP:   Lipid Profile:   HgA1c:   TSH:
Salo Alvarez MD  Interventional Cardiology  [place] Office : [address] [place] [address] [facility] [address]  Tel:   [place] Office : [address] [facility] [place]. [address]  Tel: [phone]  Cell : [phone]    Subjective : Pt lying in bed comfortable, not in distress, denies any chest pain or SOB  	  MEDICATIONS:  verapamil  milliGRAM(s) Oral daily          docusate sodium Liquid 100 milliGRAM(s) Oral two times a day  pantoprazole Infusion 8 mG/Hr IV Continuous <Continuous>  polyethylene glycol 3350 17 Gram(s) Oral two times a day  senna 2 Tablet(s) Oral at bedtime    finasteride 5 milliGRAM(s) Oral daily  levothyroxine 100 MICROGram(s) Oral daily    ammonium lactate 12% Lotion 1 Application(s) Topical two times a day  lactated ringers. 1000 milliLiter(s) IV Continuous <Continuous>      PHYSICAL EXAM:  T(C): 37.1 (04-08-18 @ 19:30), Max: 37.1 (04-08-18 @ 19:30)  HR: 70 (04-08-18 @ 19:30) (68 - 70)  BP: 157/80 (04-08-18 @ 19:30) (146/78 - 166/52)  RR: 18 (04-08-18 @ 19:30) (18 - 18)  SpO2: 98% (04-08-18 @ 19:30) (98% - 99%)  Wt(kg): --  I&O's Summary        Appearance: Normal	  HEENT:   Normal oral mucosa, PERRL, EOMI	  Cardiovascular: Normal S1 S2, No JVD, No murmurs, No edema  Respiratory: Lungs clear to auscultation	  Gastrointestinal:  Soft, Non-tender, + BS	  Extremities: Normal range of motion, No clubbing, cyanosis or edema                                    12.3   6.63  )-----------( 221      ( 08 Apr 2018 10:22 )             38.6     04-08    148<H>  |  112<H>  |  28<H>  ----------------------------<  146<H>  4.2   |  23  |  1.58<H>    Ca    9.2      08 Apr 2018 06:08      proBNP:   Lipid Profile:   HgA1c:   TSH:
Salo Alvarez MD  Interventional Cardiology  [place] Office : [address] [place] [address]  Tel:   [place] Office : [address] [facility] [place] [address]  Tel: [phone]  Cell : [phone]    Subjective : Pt lying in bed comfortable, not in distress, denies any chest pain or SOB  	  MEDICATIONS:  verapamil  milliGRAM(s) Oral daily  ALBUTerol/ipratropium for Nebulization 3 milliLiter(s) Nebulizer every 6 hours  docusate sodium Liquid 100 milliGRAM(s) Oral two times a day  pantoprazole  Injectable 40 milliGRAM(s) IV Push two times a day  polyethylene glycol 3350 17 Gram(s) Oral two times a day  senna 2 Tablet(s) Oral at bedtime  dextrose 50% Injectable 12.5 Gram(s) IV Push once  dextrose 50% Injectable 25 Gram(s) IV Push once  dextrose 50% Injectable 25 Gram(s) IV Push once  dextrose Gel 1 Dose(s) Oral once PRN  finasteride 5 milliGRAM(s) Oral daily  glucagon  Injectable 1 milliGRAM(s) IntraMuscular once PRN  levothyroxine Injectable 50 MICROGram(s) IV Push at bedtime  ammonium lactate 12% Lotion 1 Application(s) Topical two times a day  dextrose 5% with potassium chloride 20 mEq/L 1000 milliLiter(s) IV Continuous <Continuous>  dextrose 5%. 1000 milliLiter(s) IV Continuous <Continuous>      PHYSICAL EXAM:  T(C): 36.9 (04-13-18 @ 17:07), Max: 37.4 (04-13-18 @ 06:34)  HR: 82 (04-13-18 @ 22:07) (70 - 88)  BP: 122/62 (04-13-18 @ 17:07) (113/62 - 147/73)  RR: 18 (04-13-18 @ 17:07) (17 - 18)  SpO2: 97% (04-13-18 @ 22:07) (95% - 98%)  Wt(kg): --  I&O's Summary    12 Apr 2018 07:01  -  13 Apr 2018 07:00  --------------------------------------------------------  IN: 400 mL / OUT: 0 mL / NET: 400 mL    13 Apr 2018 07:01  -  13 Apr 2018 22:32  --------------------------------------------------------  IN: 1200 mL / OUT: 0 mL / NET: 1200 mL          Appearance: Normal	  HEENT:   Normal oral mucosa, PERRL, EOMI	  Cardiovascular: Normal S1 S2, No JVD, No murmurs, No edema  Respiratory: Lungs clear to auscultation	  Gastrointestinal:  Soft, Non-tender, + BS	  Extremities: Normal range of motion, No clubbing, cyanosis or edema                04-13    147<H>  |  114<H>  |  24<H>  ----------------------------<  110<H>  3.7   |  23  |  1.36<H>    Ca    8.4      13 Apr 2018 06:00  Phos  2.1     04-13  Mg     1.9     04-13      proBNP:   Lipid Profile:   HgA1c:   TSH:
Dr. Maya (Nephrology)  Office [phone]  Cell [phone]  Charity THOAMS  Cell [phone]      Patient is a 89y old  Male who presents with a chief complaint of Coffee ground emesis (11 Apr 2018 09:15)      Patient seen and examined at bedside.     VITALS:  T(F): 98 (04-13-18 @ 13:35), Max: 99.4 (04-13-18 @ 06:34)  HR: 71 (04-13-18 @ 13:35)  BP: 121/63 (04-13-18 @ 13:35)  RR: 17 (04-13-18 @ 13:35)  SpO2: 97% (04-13-18 @ 13:35)  Wt(kg): --    04-12 @ 07:01  -  04-13 @ 07:00  --------------------------------------------------------  IN: 400 mL / OUT: 0 mL / NET: 400 mL          PHYSICAL EXAM:  Constitutional: NAD  Neck: No JVD  Respiratory: congested   Cardiovascular: S1, S2, RRR  Gastrointestinal: BS+, soft, NT/ND  Extremities: No peripheral edema    Hospital Medications:   MEDICATIONS  (STANDING):  ALBUTerol/ipratropium for Nebulization 3 milliLiter(s) Nebulizer every 6 hours  ammonium lactate 12% Lotion 1 Application(s) Topical two times a day  dextrose 5% with potassium chloride 20 mEq/L 1000 milliLiter(s) (100 mL/Hr) IV Continuous <Continuous>  dextrose 5%. 1000 milliLiter(s) (50 mL/Hr) IV Continuous <Continuous>  dextrose 50% Injectable 12.5 Gram(s) IV Push once  dextrose 50% Injectable 25 Gram(s) IV Push once  dextrose 50% Injectable 25 Gram(s) IV Push once  docusate sodium Liquid 100 milliGRAM(s) Oral two times a day  finasteride 5 milliGRAM(s) Oral daily  levothyroxine Injectable 50 MICROGram(s) IV Push at bedtime  pantoprazole  Injectable 40 milliGRAM(s) IV Push two times a day  polyethylene glycol 3350 17 Gram(s) Oral two times a day  senna 2 Tablet(s) Oral at bedtime  verapamil  milliGRAM(s) Oral daily      LABS:  04-13    147<H>  |  114<H>  |  24<H>  ----------------------------<  110<H>  3.7   |  23  |  1.36<H>    Ca    8.4      13 Apr 2018 06:00  Phos  2.1     04-13  Mg     1.9     04-13      Creatinine Trend: 1.36 <--, 1.45 <--, 1.40 <--, 1.28 <--, 1.48 <--, 1.58 <--, 1.51 <--    Phosphorus Level, Serum: 2.1 mg/dL (04-13 @ 06:00)          Urine Studies:  Urinalysis - [04-04-18 @ 19:30]      Color YELLOW / Appearance CLEAR / SG 1.026 / pH 5.5      Gluc NEGATIVE / Ketone NEGATIVE  / Bili NEGATIVE / Urobili NORMAL       Blood LARGE / Protein 100 / Leuk Est NEGATIVE / Nitrite NEGATIVE      RBC 10-25 / WBC 5-10 / Hyaline  / Gran  / Sq Epi  / Non Sq Epi  / Bacteria FEW      Iron 66, TIBC 178, %sat --      [04-05-18 @ 06:59]  Ferritin 231.1      [04-05-18 @ 06:59]  HbA1c 5.4      [04-11-18 @ 06:00]  TSH 3.66      [04-04-18 @ 23:45]    HBsAg Nonreactive      [04-05-18 @ 06:59]  HCV 0.22, Nonreactive Hepatitis C AB  S/CO Ratio                        Interpretation  < 1.0                                     Non-Reactive  1.0 - 4.9                           Weakly-Reactive  > 5.0                                 Reactive  Non-Reactive: [place]erson with a non-reactive HCV antibody  result is considered uninfected.  No further action is  needed unless recent infection is suspected.  In these  cases, consider repeat testing later to detect  seroconversion..  Weakly-Reactive: HCV antibody test is abnormal, HCV RNA  Qualitative test will follow.  Reactive: HCV antibody test is abnormal, HCV RNA  Qualitative test will follow.  Note: HCV antibody testing is performed on the [employer]   system.      [04-05-18 @ 06:59]    Syphilis Screen (Treponema Pallidum Ab) Negative      [04-05-18 @ 06:59]    RADIOLOGY & ADDITIONAL STUDIES:
Patient is a 89y old  Male who presents with a chief complaint of Coffee ground emesis (11 Apr 2018 09:15)      Any change in ROS: seems much better :  no SOB     MEDICATIONS  (STANDING):  ALBUTerol/ipratropium for Nebulization 3 milliLiter(s) Nebulizer every 6 hours  ammonium lactate 12% Lotion 1 Application(s) Topical two times a day  dextrose 5% with potassium chloride 20 mEq/L 1000 milliLiter(s) (100 mL/Hr) IV Continuous <Continuous>  dextrose 5%. 1000 milliLiter(s) (50 mL/Hr) IV Continuous <Continuous>  dextrose 50% Injectable 12.5 Gram(s) IV Push once  dextrose 50% Injectable 25 Gram(s) IV Push once  dextrose 50% Injectable 25 Gram(s) IV Push once  docusate sodium Liquid 100 milliGRAM(s) Oral two times a day  finasteride 5 milliGRAM(s) Oral daily  levothyroxine Injectable 50 MICROGram(s) IV Push at bedtime  pantoprazole  Injectable 40 milliGRAM(s) IV Push two times a day  polyethylene glycol 3350 17 Gram(s) Oral two times a day  senna 2 Tablet(s) Oral at bedtime  verapamil  milliGRAM(s) Oral daily    MEDICATIONS  (PRN):  dextrose Gel 1 Dose(s) Oral once PRN Blood Glucose LESS THAN 70 milliGRAM(s)/deciliter  glucagon  Injectable 1 milliGRAM(s) IntraMuscular once PRN Glucose LESS THAN 70 milligrams/deciliter    Vital Signs Last 24 Hrs  T(C): 36.8 (12 Apr 2018 06:09), Max: 36.8 (11 Apr 2018 17:33)  T(F): 98.2 (12 Apr 2018 06:09), Max: 98.2 (11 Apr 2018 17:33)  HR: 74 (12 Apr 2018 11:18) (67 - 79)  BP: 129/66 (12 Apr 2018 06:09) (123/76 - 136/69)  BP(mean): --  RR: 20 (12 Apr 2018 06:09) (18 - 20)  SpO2: 94% (12 Apr 2018 11:18) (94% - 98%)    I&O's Summary        Physical Exam:   GENERAL: NAD, well-groomed, well-developed  HEENT: JEAN/   Atraumatic, Normocephalic  ENMT: No tonsillar erythema, exudates, or enlargement; Moist mucous membranes, Good dentition, No lesions  NECK: Supple, No JVD, Normal thyroid  CHEST/LUNG: no wheezing today   CVS: Regular rate and rhythm; No murmurs, rubs, or gallops  GI: : Soft, Nontender, Nondistended; Bowel sounds present  NERVOUS SYSTEM:  Awkae  EXTREMITIES:  2+ Peripheral Pulses, No clubbing, cyanosis, or edema  LYMPH: No lymphadenopathy noted  SKIN: No rashes or lesions  ENDOCRINOLOGY: No Thyromegaly  PSYCH:calm    Labs:                              11.4   8.07  )-----------( 194      ( 11 Apr 2018 06:00 )             35.0                         9.7    7.41  )-----------( 184      ( 10 Apr 2018 06:24 )             31.4                         12.1   6.62  )-----------( 227      ( 09 Apr 2018 07:20 )             38.0     04-12    152<H>  |  119<H>  |  25<H>  ----------------------------<  132<H>  3.6   |  21<L>  |  1.45<H>  04-11    152<H>  |  116<H>  |  28<H>  ----------------------------<  86  3.7   |  23  |  1.40<H>  04-10    135  |  104  |  24<H>  ----------------------------<  524<HH>  5.7<H>   |  23  |  1.28  04-09    153<H>  |  117<H>  |  28<H>  ----------------------------<  139<H>  3.8   |  24  |  1.48<H>    Ca    8.5      12 Apr 2018 05:35  Ca    8.9      11 Apr 2018 06:00      CAPILLARY BLOOD GLUCOSE      POCT Blood Glucose.: 116 mg/dL (12 Apr 2018 06:11)  POCT Blood Glucose.: 105 mg/dL (11 Apr 2018 20:09)  POCT Blood Glucose.: 82 mg/dL (11 Apr 2018 12:26)            Cultures:               < from: Xray Chest 1 View AP/PA (04.10.18 @ 14:46) >    COMPARISON:  April 4, 2018 at 3:40 PM. Included portions of the lower   chest on CT scan of the abdomen and pelvis from April 4, 2018.    TECHNIQUE:   AP Portable chest x-ray.    INTERPRETATION:     Heart size and the mediastinum cannot be accurately evaluated on this   projection.  Left chest wall cardiac pacemaker again seen.  A calcified tortuous thoracic aorta is again noted.  There is a calcified linear scarin the right apex. Right apical bullous   change is suggested. There is linear scar versus atelectasis in the right   midlung. Bibasilar reticular opacities are again seen.  No pleural effusion or pneumothorax is seen.              IMPRESSION:  Calcified linear scar in the right apex which suggestion of   right apical bullous change.    Linear scar versus atelectasis in the right midlung.    Bibasilar reticular opacities are seen secondary to atelectasis and   fibrosis seen on the CT scan.        ***Please see the addendum at the top of this report. It may contain   additional important information or changes.****          JOLENE SUBRAMANIAN M.D., ATTENDING RADIOLOGIST  This document has been electronically signed. Apr 10 2018  3:15PM  Addend:  JOLENE SUBRAMANIAN M.D., ATTENDING RADIOLOGIST    < end of copied text >          < from: CT Abdomen and Pelvis No Cont (04.04.18 @ 17:32) >  BILE DUCTS: Normal caliber.  GALLBLADDER: Cholelithiasis.  SPLEEN: Within normal limits.  PANCREAS: Atrophic.  ADRENALS: Within normal limits.  KIDNEYS/URETERS: 1.1 cm nonobstructive stone in the right renal pelvis.   Bilateral renal cysts. Atrophic bilateral kidneys.    BLADDER: Within normal limits.  REPRODUCTIVE ORGANS: The prostate is enlarged.    BOWEL: Small hiatal hernia. Mildly dilated loops of small bowel in the   left lower quadrant without a discrete transition point. Stool noted   within the rectum. Appendix is not visualized.  PERITONEUM: Small volume of ascites.  VESSELS:  Infrarenal IVC filter is noted. Atherosclerotic calcifications   of the aorta and its branches.  RETROPERITONEUM: No lymphadenopathy.    ABDOMINAL WALL: Within normal limits.  BONES: Degenerative changes of spine. Mild retrolisthesis of L1 on L2.    IMPRESSION:   Mildly dilated loops of small bowel in the left lower quadrant without a   discrete transition point concerning for a low-grade bowel obstruction.   Small amount of intra-abdominal ascites.    Nonobstructive stone in the right renal pelvis. No evidence of   hydroureter or hydronephrosis.  Cholelithiasis.    Findings were discussed with Dr. Martinez by Dr. Ignacio on   4/4/2018 at 6:00 PM with read back.              CELESTINO IGNACIO M.D., RADIOLOGY RESIDENT  This document has been electronically signed.  BRIELLE REYES M.D., ATTENDING RADIOLOGIST  This document has been electronically signed. Apr 4 2018  6:02PM              < end of copied text >        Studies  Chest X-RAY  CT SCAN Chest   Venous Dopplers: LE:   CT Abdomen  Others

## 2018-04-14 NOTE — PROGRESS NOTE ADULT - PROBLEM SELECTOR PROBLEM 1
Cough
RICARDO (acute kidney injury)
Upper GI bleed
RICARDO (acute kidney injury)

## 2018-04-14 NOTE — PROGRESS NOTE ADULT - PROBLEM SELECTOR PLAN 2
Baseline Scr is unclear  Has h/o CKD  In view of improving renal function, will monitor at present.
NO more bleeding: seems to be stable: transfuse prn
Baseline Scr is unclear  Has h/o CKD  In view of improving renal function, will monitor at present.
NO more bleeding: seems to be stable: transfuse prn  4/13: Defer to primary care:
NO more bleeding: seems to be stable: transfuse prn  4/13: Defer to primary care:  4/14 defer
on Verapamil for rate control  anticoagulant on hold because of GI bleeding

## 2018-04-14 NOTE — PROGRESS NOTE ADULT - PROBLEM SELECTOR PLAN 5
clinically compensated  f/u cardio
seems to be compensated
c/w levothyroxine
clinically compensated  f/u cardio
seems to be compensated
seems to be compensated  4/14 stable. no edema

## 2018-04-19 NOTE — CHART NOTE - NSCHARTNOTEFT_GEN_A_CORE
Writer received message from [address] [place] [address]. Patient's home health aide called in reference to discharge and rx for ensure pudding. Attempt to contact Home health aide Ora ( [phone]) to discuss and assist. No answer at this time and unable to leave vm.

## 2019-04-02 ENCOUNTER — INPATIENT (INPATIENT)
Facility: HOSPITAL | Age: 84
LOS: 2 days | Discharge: HOME | End: 2019-04-05
Attending: ORTHOPAEDIC SURGERY | Admitting: ORTHOPAEDIC SURGERY
Payer: MEDICARE

## 2019-04-02 VITALS
DIASTOLIC BLOOD PRESSURE: 66 MMHG | OXYGEN SATURATION: 100 % | SYSTOLIC BLOOD PRESSURE: 145 MMHG | HEART RATE: 70 BPM | TEMPERATURE: 98 F | RESPIRATION RATE: 18 BRPM

## 2019-04-02 DIAGNOSIS — S72.142S DISPLACED INTERTROCHANTERIC FRACTURE OF LEFT FEMUR, SEQUELA: Chronic | ICD-10-CM

## 2019-04-02 DIAGNOSIS — Z95.0 PRESENCE OF CARDIAC PACEMAKER: Chronic | ICD-10-CM

## 2019-04-02 DIAGNOSIS — S72.011A UNSPECIFIED INTRACAPSULAR FRACTURE OF RIGHT FEMUR, INITIAL ENCOUNTER FOR CLOSED FRACTURE: ICD-10-CM

## 2019-04-02 DIAGNOSIS — Z96.642 PRESENCE OF LEFT ARTIFICIAL HIP JOINT: Chronic | ICD-10-CM

## 2019-04-02 DIAGNOSIS — Z95.828 PRESENCE OF OTHER VASCULAR IMPLANTS AND GRAFTS: Chronic | ICD-10-CM

## 2019-04-02 PROBLEM — N40.0 BENIGN PROSTATIC HYPERPLASIA WITHOUT LOWER URINARY TRACT SYMPTOMS: Chronic | Status: ACTIVE | Noted: 2018-04-04

## 2019-04-02 PROBLEM — N18.3 CHRONIC KIDNEY DISEASE, STAGE 3 (MODERATE): Chronic | Status: ACTIVE | Noted: 2018-04-04

## 2019-04-02 PROBLEM — I50.9 HEART FAILURE, UNSPECIFIED: Chronic | Status: ACTIVE | Noted: 2017-05-08

## 2019-04-02 PROBLEM — F03.90 UNSPECIFIED DEMENTIA WITHOUT BEHAVIORAL DISTURBANCE: Chronic | Status: ACTIVE | Noted: 2017-05-08

## 2019-04-02 PROBLEM — K92.2 GASTROINTESTINAL HEMORRHAGE, UNSPECIFIED: Chronic | Status: ACTIVE | Noted: 2018-04-04

## 2019-04-02 PROBLEM — I48.2 CHRONIC ATRIAL FIBRILLATION: Chronic | Status: ACTIVE | Noted: 2018-04-04

## 2019-04-02 PROBLEM — E03.9 HYPOTHYROIDISM, UNSPECIFIED: Chronic | Status: ACTIVE | Noted: 2018-04-04

## 2019-04-02 LAB
ALBUMIN SERPL ELPH-MCNC: 3.9 G/DL — SIGNIFICANT CHANGE UP (ref 3.3–5)
ALP SERPL-CCNC: 59 U/L — SIGNIFICANT CHANGE UP (ref 40–120)
ALT FLD-CCNC: 17 U/L — SIGNIFICANT CHANGE UP (ref 4–41)
ANION GAP SERPL CALC-SCNC: 13 MMO/L — SIGNIFICANT CHANGE UP (ref 7–14)
ANION GAP SERPL CALC-SCNC: 13 MMO/L — SIGNIFICANT CHANGE UP (ref 7–14)
APTT BLD: 28.6 SEC — SIGNIFICANT CHANGE UP (ref 27.5–36.3)
AST SERPL-CCNC: 44 U/L — HIGH (ref 4–40)
BASOPHILS # BLD AUTO: 0.04 K/UL — SIGNIFICANT CHANGE UP (ref 0–0.2)
BASOPHILS NFR BLD AUTO: 0.5 % — SIGNIFICANT CHANGE UP (ref 0–2)
BILIRUB SERPL-MCNC: 0.9 MG/DL — SIGNIFICANT CHANGE UP (ref 0.2–1.2)
BLD GP AB SCN SERPL QL: NEGATIVE — SIGNIFICANT CHANGE UP
BUN SERPL-MCNC: 44 MG/DL — HIGH (ref 7–23)
BUN SERPL-MCNC: 44 MG/DL — HIGH (ref 7–23)
CALCIUM SERPL-MCNC: 9.9 MG/DL — SIGNIFICANT CHANGE UP (ref 8.4–10.5)
CALCIUM SERPL-MCNC: 9.9 MG/DL — SIGNIFICANT CHANGE UP (ref 8.4–10.5)
CHLORIDE SERPL-SCNC: 104 MMOL/L — SIGNIFICANT CHANGE UP (ref 98–107)
CHLORIDE SERPL-SCNC: 105 MMOL/L — SIGNIFICANT CHANGE UP (ref 98–107)
CO2 SERPL-SCNC: 25 MMOL/L — SIGNIFICANT CHANGE UP (ref 22–31)
CO2 SERPL-SCNC: 26 MMOL/L — SIGNIFICANT CHANGE UP (ref 22–31)
CREAT SERPL-MCNC: 1.54 MG/DL — HIGH (ref 0.5–1.3)
CREAT SERPL-MCNC: 1.58 MG/DL — HIGH (ref 0.5–1.3)
EOSINOPHIL # BLD AUTO: 0.5 K/UL — SIGNIFICANT CHANGE UP (ref 0–0.5)
EOSINOPHIL NFR BLD AUTO: 6.2 % — HIGH (ref 0–6)
GLUCOSE SERPL-MCNC: 109 MG/DL — HIGH (ref 70–99)
GLUCOSE SERPL-MCNC: 111 MG/DL — HIGH (ref 70–99)
HCT VFR BLD CALC: 37.3 % — LOW (ref 39–50)
HGB BLD-MCNC: 11.9 G/DL — LOW (ref 13–17)
IMM GRANULOCYTES NFR BLD AUTO: 0.6 % — SIGNIFICANT CHANGE UP (ref 0–1.5)
INR BLD: 1.01 — SIGNIFICANT CHANGE UP (ref 0.88–1.17)
LYMPHOCYTES # BLD AUTO: 1.19 K/UL — SIGNIFICANT CHANGE UP (ref 1–3.3)
LYMPHOCYTES # BLD AUTO: 14.8 % — SIGNIFICANT CHANGE UP (ref 13–44)
MAGNESIUM SERPL-MCNC: 2.4 MG/DL — SIGNIFICANT CHANGE UP (ref 1.6–2.6)
MCHC RBC-ENTMCNC: 31.9 % — LOW (ref 32–36)
MCHC RBC-ENTMCNC: 32.1 PG — SIGNIFICANT CHANGE UP (ref 27–34)
MCV RBC AUTO: 100.5 FL — HIGH (ref 80–100)
MONOCYTES # BLD AUTO: 0.72 K/UL — SIGNIFICANT CHANGE UP (ref 0–0.9)
MONOCYTES NFR BLD AUTO: 8.9 % — SIGNIFICANT CHANGE UP (ref 2–14)
NEUTROPHILS # BLD AUTO: 5.56 K/UL — SIGNIFICANT CHANGE UP (ref 1.8–7.4)
NEUTROPHILS NFR BLD AUTO: 69 % — SIGNIFICANT CHANGE UP (ref 43–77)
NRBC # FLD: 0 K/UL — SIGNIFICANT CHANGE UP (ref 0–0)
PHOSPHATE SERPL-MCNC: 3.2 MG/DL — SIGNIFICANT CHANGE UP (ref 2.5–4.5)
PLATELET # BLD AUTO: 147 K/UL — LOW (ref 150–400)
PMV BLD: 11.4 FL — SIGNIFICANT CHANGE UP (ref 7–13)
POTASSIUM SERPL-MCNC: 4.3 MMOL/L — SIGNIFICANT CHANGE UP (ref 3.5–5.3)
POTASSIUM SERPL-MCNC: 6 MMOL/L — HIGH (ref 3.5–5.3)
POTASSIUM SERPL-SCNC: 4.3 MMOL/L — SIGNIFICANT CHANGE UP (ref 3.5–5.3)
POTASSIUM SERPL-SCNC: 6 MMOL/L — HIGH (ref 3.5–5.3)
PROT SERPL-MCNC: 8.3 G/DL — SIGNIFICANT CHANGE UP (ref 6–8.3)
PROTHROM AB SERPL-ACNC: 11.2 SEC — SIGNIFICANT CHANGE UP (ref 9.8–13.1)
RBC # BLD: 3.71 M/UL — LOW (ref 4.2–5.8)
RBC # FLD: 13.9 % — SIGNIFICANT CHANGE UP (ref 10.3–14.5)
RH IG SCN BLD-IMP: POSITIVE — SIGNIFICANT CHANGE UP
SODIUM SERPL-SCNC: 142 MMOL/L — SIGNIFICANT CHANGE UP (ref 135–145)
SODIUM SERPL-SCNC: 144 MMOL/L — SIGNIFICANT CHANGE UP (ref 135–145)
WBC # BLD: 8.06 K/UL — SIGNIFICANT CHANGE UP (ref 3.8–10.5)
WBC # FLD AUTO: 8.06 K/UL — SIGNIFICANT CHANGE UP (ref 3.8–10.5)

## 2019-04-02 PROCEDURE — 73522 X-RAY EXAM HIPS BI 3-4 VIEWS: CPT | Mod: 26

## 2019-04-02 PROCEDURE — 99221 1ST HOSP IP/OBS SF/LOW 40: CPT

## 2019-04-02 PROCEDURE — 99223 1ST HOSP IP/OBS HIGH 75: CPT

## 2019-04-02 PROCEDURE — 73552 X-RAY EXAM OF FEMUR 2/>: CPT | Mod: 26,RT

## 2019-04-02 PROCEDURE — 71045 X-RAY EXAM CHEST 1 VIEW: CPT | Mod: 26

## 2019-04-02 RX ORDER — LEVOTHYROXINE SODIUM 125 MCG
112 TABLET ORAL DAILY
Qty: 0 | Refills: 0 | Status: DISCONTINUED | OUTPATIENT
Start: 2019-04-02 | End: 2019-04-03

## 2019-04-02 RX ORDER — OXYCODONE HYDROCHLORIDE 5 MG/1
2.5 TABLET ORAL EVERY 4 HOURS
Qty: 0 | Refills: 0 | Status: DISCONTINUED | OUTPATIENT
Start: 2019-04-02 | End: 2019-04-05

## 2019-04-02 RX ORDER — LEVOTHYROXINE SODIUM 125 MCG
1 TABLET ORAL
Qty: 0 | Refills: 0 | COMMUNITY

## 2019-04-02 RX ORDER — MAGNESIUM HYDROXIDE 400 MG/1
30 TABLET, CHEWABLE ORAL DAILY
Qty: 0 | Refills: 0 | Status: DISCONTINUED | OUTPATIENT
Start: 2019-04-02 | End: 2019-04-05

## 2019-04-02 RX ORDER — FINASTERIDE 5 MG/1
5 TABLET, FILM COATED ORAL DAILY
Qty: 0 | Refills: 0 | Status: DISCONTINUED | OUTPATIENT
Start: 2019-04-02 | End: 2019-04-05

## 2019-04-02 RX ORDER — LANOLIN ALCOHOL/MO/W.PET/CERES
3 CREAM (GRAM) TOPICAL AT BEDTIME
Qty: 0 | Refills: 0 | Status: DISCONTINUED | OUTPATIENT
Start: 2019-04-02 | End: 2019-04-05

## 2019-04-02 RX ORDER — FINASTERIDE 5 MG/1
1 TABLET, FILM COATED ORAL
Qty: 0 | Refills: 0 | COMMUNITY

## 2019-04-02 RX ORDER — VERAPAMIL HCL 240 MG
120 CAPSULE, EXTENDED RELEASE PELLETS 24 HR ORAL DAILY
Qty: 0 | Refills: 0 | Status: DISCONTINUED | OUTPATIENT
Start: 2019-04-02 | End: 2019-04-05

## 2019-04-02 RX ORDER — VERAPAMIL HCL 240 MG
120 CAPSULE, EXTENDED RELEASE PELLETS 24 HR ORAL DAILY
Qty: 0 | Refills: 0 | Status: DISCONTINUED | OUTPATIENT
Start: 2019-04-02 | End: 2019-04-02

## 2019-04-02 RX ORDER — DOCUSATE SODIUM 100 MG
100 CAPSULE ORAL THREE TIMES A DAY
Qty: 0 | Refills: 0 | Status: DISCONTINUED | OUTPATIENT
Start: 2019-04-02 | End: 2019-04-03

## 2019-04-02 RX ORDER — ENOXAPARIN SODIUM 100 MG/ML
30 INJECTION SUBCUTANEOUS EVERY 24 HOURS
Qty: 0 | Refills: 0 | Status: DISCONTINUED | OUTPATIENT
Start: 2019-04-02 | End: 2019-04-03

## 2019-04-02 RX ORDER — ACETAMINOPHEN 500 MG
975 TABLET ORAL EVERY 8 HOURS
Qty: 0 | Refills: 0 | Status: DISCONTINUED | OUTPATIENT
Start: 2019-04-02 | End: 2019-04-05

## 2019-04-02 RX ORDER — FERROUS SULFATE 325(65) MG
1 TABLET ORAL
Qty: 0 | Refills: 0 | COMMUNITY

## 2019-04-02 RX ORDER — PANTOPRAZOLE SODIUM 20 MG/1
40 TABLET, DELAYED RELEASE ORAL
Qty: 0 | Refills: 0 | Status: DISCONTINUED | OUTPATIENT
Start: 2019-04-02 | End: 2019-04-05

## 2019-04-02 RX ORDER — FERROUS SULFATE 325(65) MG
325 TABLET ORAL DAILY
Qty: 0 | Refills: 0 | Status: DISCONTINUED | OUTPATIENT
Start: 2019-04-02 | End: 2019-04-05

## 2019-04-02 RX ORDER — SENNA PLUS 8.6 MG/1
2 TABLET ORAL AT BEDTIME
Qty: 0 | Refills: 0 | Status: DISCONTINUED | OUTPATIENT
Start: 2019-04-02 | End: 2019-04-05

## 2019-04-02 RX ORDER — VERAPAMIL HCL 240 MG
1 CAPSULE, EXTENDED RELEASE PELLETS 24 HR ORAL
Qty: 0 | Refills: 0 | COMMUNITY

## 2019-04-02 RX ORDER — OXYCODONE HYDROCHLORIDE 5 MG/1
5 TABLET ORAL EVERY 4 HOURS
Qty: 0 | Refills: 0 | Status: DISCONTINUED | OUTPATIENT
Start: 2019-04-02 | End: 2019-04-05

## 2019-04-02 RX ORDER — SENNA PLUS 8.6 MG/1
1 TABLET ORAL
Qty: 0 | Refills: 0 | COMMUNITY

## 2019-04-02 RX ORDER — DOCUSATE SODIUM 100 MG
1 CAPSULE ORAL
Qty: 0 | Refills: 0 | COMMUNITY

## 2019-04-02 RX ORDER — ACETAMINOPHEN 500 MG
2 TABLET ORAL
Qty: 0 | Refills: 0 | COMMUNITY

## 2019-04-02 NOTE — H&P ADULT - HISTORY OF PRESENT ILLNESS
89yMale c/o R hip pain since this morning per his aid. Pt has 24hr HHA but had no witnessed fall. His aid noticed excruciating pain when she would move the right leg. She notes no other areas of concern Per the aid, the patient is non-ambulatory and uses a yolande lift for transfers. Per notes, on his admission last year, he was minimally ambulatory.

## 2019-04-02 NOTE — H&P ADULT - NSHPPHYSICALEXAM_GEN_ALL_CORE
PE  Gen: Laying in bed, NAD  Resp: Unlabored breathing  RLE: Skin intact, no ecchymosis,   SILT DP/SP/ Torri/Saph/Tib  +EHL/FHL/TA/Gastroc,   Knee/ankle painless ROM,   hip ROM limited 2/2 pain,   DP+,   soft compartments, no calf ttp,   +log roll.    Secondary:  No TTP over bony landmarks, SILT BL, ROM intact BL, distal pulses palpable.    Imaging:  XR demonstrating R FN hip fracture

## 2019-04-02 NOTE — ED ADULT NURSE REASSESSMENT NOTE - NS ED NURSE REASSESS COMMENT FT1
report received from KUNAL Nash, pt A&Ox0, at mental baseline hx dementia. pt appears in NAD, paced rhythm on tele monitor. Surgery MD Darryl at bedside performing evaluation. pt unable to follow commands, aide at bedside states this is pt baseline. pt noted to be incontinent in diaper. comfort measures provided. aide in contact with family regarding plan of care. will ctm. report received from KUNAL Nash, pt A&Ox0, at mental baseline hx dementia. pt appears in NAD, paced rhythm on tele monitor. ortho MD Darryl at bedside performing evaluation. pt unable to follow commands, aide at bedside states this is pt baseline. pt noted to be incontinent in diaper. comfort measures provided. aide in contact with family regarding plan of care. will ctm.

## 2019-04-02 NOTE — H&P ADULT - ASSESSMENT
89M with R hip FN fx    ·	admit to ortho  ·	multimodal pain control  ·	IS  ·	reg diet,   ·	bed rest   ·	c/w home meds  ·	FU med/cards  ·	plan for OR Thursday

## 2019-04-02 NOTE — ED PROVIDER NOTE - CLINICAL SUMMARY MEDICAL DECISION MAKING FREE TEXT BOX
Patient is a 89y Male DNR/DNI , +PPM, S/P IVC Filter - h/o atrial fibrillation on Xarelto, and prior UGIB (1990s, treated with coil embolization - as described by patient's healthcare proxy), BPH, CKD stage 3, Hypothyroid, Dementia who presents for right leg pain. Will check labs and xray of the right hip/leg. Will reevaluate

## 2019-04-02 NOTE — ED PROVIDER NOTE - OBJECTIVE STATEMENT
Patient is a 89y Male DNR/DNI , +PPM, S/P IVC Filter - h/o atrial fibrillation on Xarelto, and prior UGIB (1990s, treated with coil embolization - as described by patient's healthcare proxy), BPH, CKD stage 3, Hypothyroid, Dementia who presents for right leg pain. Patient was being cleaned by his health aid this AM, and the patient started screaming in pain when she moved his right leg. The aid noticed a bump on the right hip and brought him in for further eval. Patient is a 89y Male DNR/DNI , +PPM, S/P IVC Filter - h/o atrial fibrillation on Xarelto, and prior UGIB (1990s, treated with coil embolization - as described by patient's healthcare proxy), BPH, CKD stage 3, Hypothyroid, Dementia who presents for right leg pain. Patient was being cleaned by his health aid this AM, and the patient started screaming in pain when she moved his right leg. The aid noticed a bump on the right hip and brought him in for further evalLeyda Alcazar att: 89M h/o dementia, afib on xarelto ppm brought in by aide for concern for rle pain. Per long time aide this morning while cleaning patient noted he was screaming when she moved his right leg. Aide notes tender bump prox rle.

## 2019-04-02 NOTE — CONSULT NOTE ADULT - SUBJECTIVE AND OBJECTIVE BOX
Ora Hernandez at bedside  : HHA for 3 years [phone]  89yMale DNR/I , PPM, dementia, afib off xarelto for last year 2/2 GI bleed, listed h/o IVC filter, BPH, HTN, hypothyroid, CKD.  Complains of  R hip pain since this morning per his aid. Pt has 24hr HHA but had no witnessed fall. His aid noticed excruciating pain when she would move the right leg. She notes no other areas of concern. Per the aid, the patient is non-ambulatory and uses a yolande lift for transfers. Per notes, on his admission last year, he was minimally ambulatory. Pt found to have right hip femoral neck fracture. Pt with Scr 1.58, which is with-in broad range of values listed April 2018.  HHA reports baseline voiding habits. Electrolytes wnl    Review of Systems:   limited ros due to baseline mental status     PHYSICAL EXAM:      Constitutional: NAD, well-groomed, well-developed  HEENT: EOMI  Neck: No LAD, No JVD  Back: Normal spine flexure, No CVA tenderness  Respiratory: CTAB anterior lung fields  Cardiovascular: S1 and S2, RRR  Gastrointestinal: BS+, soft, NT/ND  Extremities: No peripheral edema  Vascular: 2+ peripheral pulses  Neurological: A/O x 1  Psychiatric: Normal mood, normal affect  Musculoskeletal: limited rom on rle  Skin: No rashes      Vital Signs Last 24 Hrs  T(C): 36.8 (03 Apr 2019 01:27), Max: 36.9 (03 Apr 2019 00:50)  T(F): 98.3 (03 Apr 2019 01:27), Max: 98.5 (03 Apr 2019 00:50)  HR: 70 (03 Apr 2019 01:27) (61 - 73)  BP: 151/85 (03 Apr 2019 01:27) (145/66 - 151/85)  BP(mean): --  RR: 18 (03 Apr 2019 01:27) (18 - 18)  SpO2: 94% (03 Apr 2019 01:27) (94% - 100%)                            11.9   8.06  )-----------( 147      ( 02 Apr 2019 20:15 )             37.3     04-02    144  |  105  |  44<H>  ----------------------------<  111<H>  4.3   |  26  |  1.58<H>    Ca    9.9      02 Apr 2019 21:25  Phos  3.2     04-02  Mg     2.4     04-02    TPro  8.3  /  Alb  3.9  /  TBili  0.9  /  DBili  x   /  AST  44<H>  /  ALT  17  /  AlkPhos  59  04-02    CAPILLARY BLOOD GLUCOSE        PT/INR - ( 02 Apr 2019 20:15 )   PT: 11.2 SEC;   INR: 1.01          PTT - ( 02 Apr 2019 20:15 )  PTT:28.6 SEC          Slim Noble  [phone]  Freddie Castanon- cardiology  [phone]  J Francisco J Garcia - [phone]  Freddie Guysmith -[facility] Care Proxy- [phone]  Ora Hernandez : HHA for 3 years [phone] Ora Hernandez at bedside  : HHA for 3 years [phone]  89yMale DNR/I , PPM, dementia, afib off xarelto for last year 2/2 GI bleed, listed h/o IVC filter, BPH, HTN, hypothyroid, CKD.  Complains of  R hip pain since this morning per his aid. Pt has 24hr HHA but had no witnessed fall. His aid noticed excruciating pain when she would move the right leg. She notes no other areas of concern. Per the aid, the patient is non-ambulatory and uses a yolande lift for transfers. Per notes, on his admission last year, he was minimally ambulatory. Pt found to have right hip femoral neck fracture. Pt with Scr 1.58, which is with-in broad range of values listed April 2018.  HHA reports baseline voiding habits. Electrolytes wnl      Slim Noble  [phone]  Freddie Castanon- cardiology  [phone]  J Francisco J Garcia - [phone]  Freddie Guysmith -Health Care Proxy- [phone]  Ora Hernandez : HHA for 3 years [phone]        Review of Systems:   limited ros due to baseline mental status     PHYSICAL EXAM:      Constitutional: NAD, well-groomed, well-developed  HEENT: EOMI  Neck: No LAD, No JVD  Back: Normal spine flexure, No CVA tenderness  Respiratory: CTAB anterior lung fields  Cardiovascular: S1 and S2, RRR  Gastrointestinal: BS+, soft, NT/ND  Extremities: No peripheral edema  Vascular: 2+ peripheral pulses  Neurological: A/O x 1  Psychiatric: Normal mood, normal affect  Musculoskeletal: limited rom on rle  Skin: No rashes      Vital Signs Last 24 Hrs  T(C): 36.8 (03 Apr 2019 01:27), Max: 36.9 (03 Apr 2019 00:50)  T(F): 98.3 (03 Apr 2019 01:27), Max: 98.5 (03 Apr 2019 00:50)  HR: 70 (03 Apr 2019 01:27) (61 - 73)  BP: 151/85 (03 Apr 2019 01:27) (145/66 - 151/85)  BP(mean): --  RR: 18 (03 Apr 2019 01:27) (18 - 18)  SpO2: 94% (03 Apr 2019 01:27) (94% - 100%)                            11.9   8.06  )-----------( 147      ( 02 Apr 2019 20:15 )             37.3     04-02    144  |  105  |  44<H>  ----------------------------<  111<H>  4.3   |  26  |  1.58<H>    Ca    9.9      02 Apr 2019 21:25  Phos  3.2     04-02  Mg     2.4     04-02    TPro  8.3  /  Alb  3.9  /  TBili  0.9  /  DBili  x   /  AST  44<H>  /  ALT  17  /  AlkPhos  59  04-02    CAPILLARY BLOOD GLUCOSE        PT/INR - ( 02 Apr 2019 20:15 )   PT: 11.2 SEC;   INR: 1.01          PTT - ( 02 Apr 2019 20:15 )  PTT:28.6 SEC

## 2019-04-02 NOTE — ED PROVIDER NOTE - CARE PLAN
Spine appears normal, range of motion is not limited, no muscle or joint tenderness Principal Discharge DX:	Subcapital fracture of femur, right, closed, initial encounter

## 2019-04-02 NOTE — ED PROVIDER NOTE - PMH
Atrial fibrillation, chronic    BPH (benign prostatic hyperplasia)    CKD (chronic kidney disease) stage 3, GFR 30-59 ml/min    Congestive heart failure    Dementia    GI bleed    Hypothyroid    Pacemaker    S/P IVC filter

## 2019-04-02 NOTE — H&P ADULT - NSICDXPASTMEDICALHX_GEN_ALL_CORE_FT
PAST MEDICAL HISTORY:  Atrial fibrillation, chronic     BPH (benign prostatic hyperplasia)     CKD (chronic kidney disease) stage 3, GFR 30-59 ml/min     Congestive heart failure     Dementia     GI bleed     Hypothyroid     Pacemaker     S/P IVC filter

## 2019-04-02 NOTE — ED PROVIDER NOTE - PHYSICAL EXAMINATION
PHYSICAL EXAM:  GENERAL: NAD, well-developed  HEAD:  Atraumatic, Normocephalic  EYES: EOMI, PERRLA, conjunctiva and sclera clear  NECK: Supple, No JVD  CHEST/LUNG: Clear to auscultation bilaterally; No wheeze  HEART: Regular rate and rhythm; No murmurs, rubs, or gallops  ABDOMEN: Soft, Nontender, Nondistended; Bowel sounds present  EXTREMITIES:  2+ Peripheral Pulses, No clubbing, cyanosis, or edema. b/l LE equal in length and position. No TTP over the R hip. ROM of theR hip extremely limited by pain  PSYCH: AAOx1  NEUROLOGY: non-focal

## 2019-04-02 NOTE — ED ADULT NURSE NOTE - OBJECTIVE STATEMENT
Pt BIBEMS presenting to room 20 AxOx0 with c/o right hip pain x1 day. Pt arriving from home, with aid at bedside who states pt was in bed this AM when he started screaming when his right leg was lifted during diaper change. Aid states pt has a pacemaker, and PMH of HTN and dementia- nonverbal and unable to have a conversation/follow commands at baseline. Aid at bedside states pt has not fallen recently. Visible bruising or redness to right hip area is not present. Pt is able to turn from side to side in bed with assistance from aid without distress or evidence of pain, but screams when right leg is lifted up. Pt's skin dry and intact, warm to touch without pressure uclers present on arrival, pt is incontinent. Pt does not appear to be in any acute respiratory distress on arrival- retractions not noted, breathing unlabored, pt satting well on RA. Vitals noted and stable, MD at bedside, will continue to monitor.

## 2019-04-02 NOTE — ED PROVIDER NOTE - PROGRESS NOTE DETAILS
Ottoniel att: Ortho saw patient's x-ray, dx subcapital rt humerus fx. Request admit to Ortho, npo after midnight, surgery tomorrow. HC proxy amenable.

## 2019-04-02 NOTE — ED PROVIDER NOTE - ATTENDING CONTRIBUTION TO CARE
Dr. Alcazar: I have personally seen and examined this patient at the bedside. I have fully participated in the care of this patient. I have reviewed all pertinent clinical information, including history, physical exam, plan and the Resident's note and agree except as noted. HPI above as by me. PE above as by me. DDX hip fx, femur fx PLAN xr, preop labs, ortho c/s

## 2019-04-02 NOTE — H&P ADULT - NSICDXPASTSURGICALHX_GEN_ALL_CORE_FT
PAST SURGICAL HISTORY:  Artificial cardiac pacemaker     Closed intertrochanteric fracture of left hip, sequela sp IMN 2003 in [place]    S/P IVC filter

## 2019-04-02 NOTE — H&P ADULT - ATTENDING COMMENTS
Long discussion with HCP Freddie Mares [phone] regarding nature of condition, potential course / sequelae, options reviewed including potential surgical intervention R hip HA procedure.  According to HCP and confirmed with HHA, patient chronic non-ambulatory status and ceased all standing / leg down for transfers since hospitalization in 2018 (duration > 4 months), currently Yes[place] lift for transfer assistance.  Risks, benefits, indications, and alternatives reviewed in detail, including rationale for surgical intervention in the context of pain management.  Although HCP initially favored surgical management with consent SOC, follow-up telephone call per HCP request, with HCP specifically deferring operative treatment at this juncture and now requesting conservative non-operative treatment.  All questions answered.  Recommend bed to chair transfers to mobilize patient as before, discharge planning accordingly, and outpatient orthopedic follow-up.

## 2019-04-02 NOTE — ED ADULT NURSE REASSESSMENT NOTE - NS ED NURSE REASSESS COMMENT FT1
pt provided with meal of soft foods, assisted to eat by aide. pt diaper checked, does not need to be changed at this time. pt resting comfortably. awaiting bed assignment, will ctm.

## 2019-04-02 NOTE — ED ADULT TRIAGE NOTE - CHIEF COMPLAINT QUOTE
Patient brought to ER by EMS because after his bath this morning he started to scream in pain when his right hip was moved. Hip is also swollen. Aide denies fall.

## 2019-04-03 DIAGNOSIS — S72.011A UNSPECIFIED INTRACAPSULAR FRACTURE OF RIGHT FEMUR, INITIAL ENCOUNTER FOR CLOSED FRACTURE: ICD-10-CM

## 2019-04-03 DIAGNOSIS — I10 ESSENTIAL (PRIMARY) HYPERTENSION: ICD-10-CM

## 2019-04-03 DIAGNOSIS — N40.0 BENIGN PROSTATIC HYPERPLASIA WITHOUT LOWER URINARY TRACT SYMPTOMS: ICD-10-CM

## 2019-04-03 DIAGNOSIS — Z29.9 ENCOUNTER FOR PROPHYLACTIC MEASURES, UNSPECIFIED: ICD-10-CM

## 2019-04-03 DIAGNOSIS — N28.9 DISORDER OF KIDNEY AND URETER, UNSPECIFIED: ICD-10-CM

## 2019-04-03 DIAGNOSIS — E03.9 HYPOTHYROIDISM, UNSPECIFIED: ICD-10-CM

## 2019-04-03 LAB
24R-OH-CALCIDIOL SERPL-MCNC: 20.7 NG/ML — LOW (ref 30–80)
ALBUMIN SERPL ELPH-MCNC: 3.9 G/DL — SIGNIFICANT CHANGE UP (ref 3.3–5)
ANION GAP SERPL CALC-SCNC: 14 MMO/L — SIGNIFICANT CHANGE UP (ref 7–14)
APPEARANCE UR: CLEAR — SIGNIFICANT CHANGE UP
BACTERIA # UR AUTO: NEGATIVE — SIGNIFICANT CHANGE UP
BASOPHILS # BLD AUTO: 0.04 K/UL — SIGNIFICANT CHANGE UP (ref 0–0.2)
BASOPHILS NFR BLD AUTO: 0.6 % — SIGNIFICANT CHANGE UP (ref 0–2)
BILIRUB UR-MCNC: NEGATIVE — SIGNIFICANT CHANGE UP
BLD GP AB SCN SERPL QL: NEGATIVE — SIGNIFICANT CHANGE UP
BLOOD UR QL VISUAL: NEGATIVE — SIGNIFICANT CHANGE UP
BUN SERPL-MCNC: 42 MG/DL — HIGH (ref 7–23)
CALCIUM SERPL-MCNC: 10 MG/DL — SIGNIFICANT CHANGE UP (ref 8.4–10.5)
CHLORIDE SERPL-SCNC: 105 MMOL/L — SIGNIFICANT CHANGE UP (ref 98–107)
CO2 SERPL-SCNC: 24 MMOL/L — SIGNIFICANT CHANGE UP (ref 22–31)
COLOR SPEC: YELLOW — SIGNIFICANT CHANGE UP
CREAT SERPL-MCNC: 1.38 MG/DL — HIGH (ref 0.5–1.3)
EOSINOPHIL # BLD AUTO: 0.35 K/UL — SIGNIFICANT CHANGE UP (ref 0–0.5)
EOSINOPHIL NFR BLD AUTO: 5 % — SIGNIFICANT CHANGE UP (ref 0–6)
GLUCOSE SERPL-MCNC: 109 MG/DL — HIGH (ref 70–99)
GLUCOSE UR-MCNC: NEGATIVE — SIGNIFICANT CHANGE UP
HCT VFR BLD CALC: 38.5 % — LOW (ref 39–50)
HGB BLD-MCNC: 11.9 G/DL — LOW (ref 13–17)
HYALINE CASTS # UR AUTO: SIGNIFICANT CHANGE UP
IMM GRANULOCYTES NFR BLD AUTO: 0.4 % — SIGNIFICANT CHANGE UP (ref 0–1.5)
KETONES UR-MCNC: NEGATIVE — SIGNIFICANT CHANGE UP
LEUKOCYTE ESTERASE UR-ACNC: NEGATIVE — SIGNIFICANT CHANGE UP
LYMPHOCYTES # BLD AUTO: 0.98 K/UL — LOW (ref 1–3.3)
LYMPHOCYTES # BLD AUTO: 13.9 % — SIGNIFICANT CHANGE UP (ref 13–44)
MCHC RBC-ENTMCNC: 30.9 % — LOW (ref 32–36)
MCHC RBC-ENTMCNC: 31.9 PG — SIGNIFICANT CHANGE UP (ref 27–34)
MCV RBC AUTO: 103.2 FL — HIGH (ref 80–100)
MONOCYTES # BLD AUTO: 0.64 K/UL — SIGNIFICANT CHANGE UP (ref 0–0.9)
MONOCYTES NFR BLD AUTO: 9.1 % — SIGNIFICANT CHANGE UP (ref 2–14)
NEUTROPHILS # BLD AUTO: 4.99 K/UL — SIGNIFICANT CHANGE UP (ref 1.8–7.4)
NEUTROPHILS NFR BLD AUTO: 71 % — SIGNIFICANT CHANGE UP (ref 43–77)
NITRITE UR-MCNC: NEGATIVE — SIGNIFICANT CHANGE UP
NRBC # FLD: 0 K/UL — SIGNIFICANT CHANGE UP (ref 0–0)
PH UR: 6 — SIGNIFICANT CHANGE UP (ref 5–8)
PLATELET # BLD AUTO: 132 K/UL — LOW (ref 150–400)
PMV BLD: 10.7 FL — SIGNIFICANT CHANGE UP (ref 7–13)
POTASSIUM SERPL-MCNC: 3.9 MMOL/L — SIGNIFICANT CHANGE UP (ref 3.5–5.3)
POTASSIUM SERPL-SCNC: 3.9 MMOL/L — SIGNIFICANT CHANGE UP (ref 3.5–5.3)
PROT UR-MCNC: 70 — SIGNIFICANT CHANGE UP
RBC # BLD: 3.73 M/UL — LOW (ref 4.2–5.8)
RBC # FLD: 13.4 % — SIGNIFICANT CHANGE UP (ref 10.3–14.5)
RBC CASTS # UR COMP ASSIST: SIGNIFICANT CHANGE UP (ref 0–?)
RH IG SCN BLD-IMP: POSITIVE — SIGNIFICANT CHANGE UP
SODIUM SERPL-SCNC: 143 MMOL/L — SIGNIFICANT CHANGE UP (ref 135–145)
SP GR SPEC: 1.03 — SIGNIFICANT CHANGE UP (ref 1–1.04)
SQUAMOUS # UR AUTO: SIGNIFICANT CHANGE UP
UROBILINOGEN FLD QL: NORMAL — SIGNIFICANT CHANGE UP
WBC # BLD: 7.03 K/UL — SIGNIFICANT CHANGE UP (ref 3.8–10.5)
WBC # FLD AUTO: 7.03 K/UL — SIGNIFICANT CHANGE UP (ref 3.8–10.5)
WBC UR QL: SIGNIFICANT CHANGE UP (ref 0–?)

## 2019-04-03 PROCEDURE — 99233 SBSQ HOSP IP/OBS HIGH 50: CPT

## 2019-04-03 PROCEDURE — 93280 PM DEVICE PROGR EVAL DUAL: CPT | Mod: 26

## 2019-04-03 RX ORDER — ENOXAPARIN SODIUM 100 MG/ML
30 INJECTION SUBCUTANEOUS ONCE
Qty: 0 | Refills: 0 | Status: COMPLETED | OUTPATIENT
Start: 2019-04-03 | End: 2019-04-03

## 2019-04-03 RX ORDER — LEVOTHYROXINE SODIUM 125 MCG
88 TABLET ORAL DAILY
Qty: 0 | Refills: 0 | Status: DISCONTINUED | OUTPATIENT
Start: 2019-04-03 | End: 2019-04-05

## 2019-04-03 RX ORDER — DOCUSATE SODIUM 100 MG
200 CAPSULE ORAL
Qty: 0 | Refills: 0 | Status: DISCONTINUED | OUTPATIENT
Start: 2019-04-03 | End: 2019-04-05

## 2019-04-03 RX ORDER — POLYETHYLENE GLYCOL 3350 17 G/17G
17 POWDER, FOR SOLUTION ORAL DAILY
Qty: 0 | Refills: 0 | Status: DISCONTINUED | OUTPATIENT
Start: 2019-04-03 | End: 2019-04-05

## 2019-04-03 RX ORDER — CHLORHEXIDINE GLUCONATE 213 G/1000ML
1 SOLUTION TOPICAL ONCE
Qty: 0 | Refills: 0 | Status: DISCONTINUED | OUTPATIENT
Start: 2019-04-03 | End: 2019-04-05

## 2019-04-03 RX ADMIN — OXYCODONE HYDROCHLORIDE 5 MILLIGRAM(S): 5 TABLET ORAL at 11:03

## 2019-04-03 RX ADMIN — Medication 120 MILLIGRAM(S): at 06:54

## 2019-04-03 RX ADMIN — Medication 975 MILLIGRAM(S): at 21:19

## 2019-04-03 RX ADMIN — FINASTERIDE 5 MILLIGRAM(S): 5 TABLET, FILM COATED ORAL at 13:21

## 2019-04-03 RX ADMIN — SENNA PLUS 2 TABLET(S): 8.6 TABLET ORAL at 21:19

## 2019-04-03 RX ADMIN — OXYCODONE HYDROCHLORIDE 5 MILLIGRAM(S): 5 TABLET ORAL at 11:33

## 2019-04-03 RX ADMIN — Medication 975 MILLIGRAM(S): at 13:21

## 2019-04-03 RX ADMIN — Medication 325 MILLIGRAM(S): at 13:21

## 2019-04-03 RX ADMIN — Medication 88 MICROGRAM(S): at 06:06

## 2019-04-03 RX ADMIN — Medication 100 MILLIGRAM(S): at 06:53

## 2019-04-03 RX ADMIN — PANTOPRAZOLE SODIUM 40 MILLIGRAM(S): 20 TABLET, DELAYED RELEASE ORAL at 06:54

## 2019-04-03 RX ADMIN — Medication 975 MILLIGRAM(S): at 06:53

## 2019-04-03 RX ADMIN — ENOXAPARIN SODIUM 30 MILLIGRAM(S): 100 INJECTION SUBCUTANEOUS at 13:24

## 2019-04-03 RX ADMIN — Medication 100 MILLIGRAM(S): at 13:24

## 2019-04-03 NOTE — PROCEDURE NOTE - ADDITIONAL PROCEDURE DETAILS
Patient in atrial fibrillation with appropriate reprogramming > 5/2018.  As per aide at his bedside, his pacemaker is checked regularly at [facility].  He was on anticoagulation but it was dc'd due to GI bleeding.   He is VPaced at 70 bpm 97% of the time.  His underlying rhythm is AFib with slow ventricular response at 30-40 bpm.   For surgery, you can place a magnet over the device which will result in asynchronous pacing (VOO) which will ensure pacing despite use of cautery. Patient in atrial fibrillation with appropriate reprogramming > 5/2018.  As per aide at his bedside, his pacemaker is checked regularly at [facility].  He was on anticoagulation but it was dc'd due to GI bleeding.   He is VPaced at 70 bpm 97% of the time.  His underlying rhythm is AFib with slow ventricular response at 30-40 bpm.   For surgery, you can place a magnet over the device which will result in asynchronous pacing (VOO) which will ensure pacing while using  cautery.

## 2019-04-03 NOTE — PROGRESS NOTE ADULT - SUBJECTIVE AND OBJECTIVE BOX
CHIEF COMPLAINT: f/u hip fracture    SUBJECTIVE / OVERNIGHT EVENTS: Patient seen and examined. No acute events overnight. Pain well controlled and patient without any complaints.    MEDICATIONS  (STANDING):  acetaminophen   Tablet .. 975 milliGRAM(s) Oral every 8 hours  chlorhexidine 4% Liquid 1 Application(s) Topical once  docusate sodium 100 milliGRAM(s) Oral three times a day  ferrous    sulfate 325 milliGRAM(s) Oral daily  finasteride 5 milliGRAM(s) Oral daily  levothyroxine 88 MICROGram(s) Oral daily  pantoprazole    Tablet 40 milliGRAM(s) Oral before breakfast  senna 2 Tablet(s) Oral at bedtime  verapamil  milliGRAM(s) Oral daily    MEDICATIONS  (PRN):  magnesium hydroxide Suspension 30 milliLiter(s) Oral daily PRN Constipation  melatonin 3 milliGRAM(s) Oral at bedtime PRN Insomnia  oxyCODONE    IR 2.5 milliGRAM(s) Oral every 4 hours PRN Moderate Pain (4 - 6)  oxyCODONE    IR 5 milliGRAM(s) Oral every 4 hours PRN Severe Pain (7 - 10)    VITALS:  T(F): 97.8 (19 @ 13:47), Max: 98.5 (19 @ 00:50)  HR: 71 (19 @ 13:47) (61 - 73)  BP: 130/68 (19 @ 13:47) (130/68 - 153/68)  RR: 18 (19 @ 13:47) (16 - 18)  SpO2: 98% (19 @ 13:47)  Height (cm): 170.18 (06:54)  Weight (kg): 66.8 (06:54)  BMI (kg/m2): 23.1 (06:54)    PHYSICAL EXAM:  GENERAL: NAD, well-developed  HEAD:  Atraumatic, Normocephalic  EYES: EOMI, PERRLA, conjunctiva and sclera clear  NECK: Supple, No JVD  CHEST/LUNG: Clear to auscultation bilaterally; No wheeze  HEART: Regular rate and rhythm; No murmurs, rubs, or gallops  ABDOMEN: Soft, Nontender, Nondistended; Bowel sounds present  EXTREMITIES:  Skin intact, no ecchymosis, ROM limited 2/2 pain   PSYCH: AAOx3  NEUROLOGY: AO x1   SKIN: No rashes or lesions    LABS:              11.9                 143  | 24   | 42           7.03  >-----------< 132     ------------------------< 109                   38.5                 3.9  | 105  | 1.38                                         Ca 10.0  Mg x     Ph x           TPro  x   /  Alb  3.9      TBili  x   /  DBili  x         AST  x   /  ALT  x             AlkPhos  x       INR: 1.01 ;    PT: 11.2 SEC;    PTT: 28.6 SEC    Urinalysis Basic - ( 2019 11:00 )  Color: YELLOW / Appearance: CLEAR / S.026 / pH: 6.0  Gluc: NEGATIVE / Ketone: NEGATIVE  / Bili: NEGATIVE / Urobili: NORMAL   Blood: NEGATIVE / Protein: 70 / Nitrite: NEGATIVE   Leuk Esterase: NEGATIVE / RBC: 3-5 / WBC 0-2   Sq Epi: OCC / Non Sq Epi: x / Bacteria: NEGATIVE    RADIOLOGY & ADDITIONAL TESTS:  Imaging Personally Reviewed: [x] Yes  < from: Xray Chest 1 View AP/PA (19 @ 21:01) >  IMPRESSION:   No focal lung consolidations.    < end of copied text >    [ ] Consultant(s) Notes Reviewed:  [x] Care Discussed with Consultants/Other Providers: Orthopedic PA - discussed EP interrogation

## 2019-04-03 NOTE — PROGRESS NOTE ADULT - SUBJECTIVE AND OBJECTIVE BOX
Diagnosis: Right Hip Femoral Neck Fracture  Surgeon: Dr Mae   Procedure: Right Hip Hemiarthroplasty     Labs:                        11.9   7.03  )-----------( 132      ( 03 Apr 2019 05:20 )             38.5       04-03    143  |  105  |  42<H>  ----------------------------<  109<H>  3.9   |  24  |  1.38<H>    Ca    10.0      03 Apr 2019 05:20  Phos  3.2     04-02  Mg     2.4     04-02    TPro  x   /  Alb  3.9  /  TBili  x   /  DBili  x   /  AST  x   /  ALT  x   /  AlkPhos  x   04-03      PT/INR - ( 02 Apr 2019 20:15 )   PT: 11.2 SEC;   INR: 1.01          PTT - ( 02 Apr 2019 20:15 )  PTT:28.6 SEC    Type and Screen X 2: Pending  EKG: Complete in chart  CXR: Clear  Consent: complete in chart  PPM: Pending Interrogation from EP (aware)  Clearance: Medically Optimized  2 Units PRBC on Hold for the Operating Room     A/P: Patient is a 89 y/o Male Pending Orthopaedic surgery tomorrow    -NPO and IVF @ midnight  -pain control/analgesia  -Hold Anticoagulation  -Inc Spirometry  -FU Pacemaker interrogation  -F/U Pending Labs / UA  -Notify Ortho with any questions

## 2019-04-03 NOTE — PROGRESS NOTE ADULT - PROBLEM SELECTOR PLAN 4
-unclear baseline  -HHA notes normal voiding habits-  -creatinine downtrending, electrolytes wnl -- will continue to monitor.

## 2019-04-03 NOTE — CHART NOTE - NSCHARTNOTEFT_GEN_A_CORE
Preop Dx:  Right femoral neck fracture  Surgeon: Tu  Procedure: Right hip hemiarthroplasty    Vital Signs Last 24 Hrs  T(C): 36.8 (03 Apr 2019 01:27), Max: 36.9 (03 Apr 2019 00:50)  T(F): 98.3 (03 Apr 2019 01:27), Max: 98.5 (03 Apr 2019 00:50)  HR: 70 (03 Apr 2019 01:27) (61 - 73)  BP: 151/85 (03 Apr 2019 01:27) (145/66 - 151/85)  BP(mean): --  RR: 18 (03 Apr 2019 01:27) (18 - 18)  SpO2: 94% (03 Apr 2019 01:27) (94% - 100%)                        11.9   8.06  )-----------( 147      ( 02 Apr 2019 20:15 )             37.3     04-02    144  |  105  |  44<H>  ----------------------------<  111<H>  4.3   |  26  |  1.58<H>    Ca    9.9      02 Apr 2019 21:25  Phos  3.2     04-02  Mg     2.4     04-02    TPro  8.3  /  Alb  3.9  /  TBili  0.9  /  DBili  x   /  AST  44<H>  /  ALT  17  /  AlkPhos  59  04-02    PT/INR - ( 02 Apr 2019 20:15 )   PT: 11.2 SEC;   INR: 1.01          PTT - ( 02 Apr 2019 20:15 )  PTT:28.6 SEC  Daily     Daily     EKG: in chart  CXR:   PROCEDURE DATE:  Apr 2 2019     ******PRELIMINARY REPORT******    ******PRELIMINARY REPORT******            INTERPRETATION:  No focal consolidation    < end of copied text >      Type and Screen:   Type + Screen (04.02.19 @ 19:33)    ABO Interpretation: O    Rh Interpretation: Positive    Antibody Screen: Negative      Plan:  - OR 4/4/19 for right hip hemiarthroplasty with Dr. Mae  - NPO after midnight except meds  - IVF while NPO  - Consent done via HCP  - Medical clearance for OR done  -pending EP interrogation

## 2019-04-03 NOTE — PROGRESS NOTE ADULT - PROBLEM SELECTOR PLAN 1
-per initial consult, "based on aha/acc guidelines, pt high risk for procedure. Currently hemodynamically stable. Pt shortly due for ppm interrogation which should be performed prior to OR. No other intervention/ optimization likely required prior to procedure as long as pt remains hemodynamically stable. Of note, HCP in agreement to revoke DNR/I for procedure"    Slim Noble  [phone]  Freddie Castanon- cardiology  [phone]  MATT Garcia - [phone]  Freddie Guysmith -[facility] Proxy- [phone]  Ora Hernandez : HHA for 3 years [phone]

## 2019-04-03 NOTE — PROGRESS NOTE ADULT - ASSESSMENT
89M DNR/DNI h/o PPM, dementia, Afib off xarelto for last year 2/2 GI bleed, listed h/o IVC filter, BPH, HTN, hypothyroid, CKD here with right hip fx awaiting repair tomorrow.

## 2019-04-03 NOTE — PROGRESS NOTE ADULT - SUBJECTIVE AND OBJECTIVE BOX
Orthopaedic Surgery Progress Note    Subjective:   Patient seen and examined. Admitted with R femoral neck fracture, OR 4/4  Pain controlled    Objective:  T(C): 36.1 (04-03-19 @ 05:39), Max: 36.9 (04-03-19 @ 00:50)  HR: 69 (04-03-19 @ 05:39) (61 - 73)  BP: 153/68 (04-03-19 @ 05:39) (145/66 - 153/68)  RR: 16 (04-03-19 @ 05:39) (16 - 18)  SpO2: 95% (04-03-19 @ 05:39) (94% - 100%)  Wt(kg): --      PE    NAD  RLE:   skin: intact, externally rotataed  motor grossly intact GS/TA, not following commands  unable to assess sensation  WWP  Good capillary refill                          11.9   7.03  )-----------( 132      ( 03 Apr 2019 05:20 )             38.5     04-03    143  |  105  |  x   ----------------------------<  x   3.9   |  x   |  x     Ca    10.0      03 Apr 2019 05:20  Phos  3.2     04-02  Mg     2.4     04-02    TPro  8.3  /  Alb  3.9  /  TBili  0.9  /  DBili  x   /  AST  44<H>  /  ALT  17  /  AlkPhos  59  04-02    PT/INR - ( 02 Apr 2019 20:15 )   PT: 11.2 SEC;   INR: 1.01          PTT - ( 02 Apr 2019 20:15 )  PTT:28.6 SEC      89y Male w/ R FN fx, OR 4/4  - NPO after midnight  - hold lovenox after midnight  - Needs PM interrogation  - Pain control  - NWB RLE, bedrest

## 2019-04-04 LAB
ANION GAP SERPL CALC-SCNC: 13 MMO/L — SIGNIFICANT CHANGE UP (ref 7–14)
APTT BLD: 24.1 SEC — LOW (ref 27.5–36.3)
BLD GP AB SCN SERPL QL: NEGATIVE — SIGNIFICANT CHANGE UP
BUN SERPL-MCNC: 46 MG/DL — HIGH (ref 7–23)
CALCIUM SERPL-MCNC: 9.6 MG/DL — SIGNIFICANT CHANGE UP (ref 8.4–10.5)
CHLORIDE SERPL-SCNC: 107 MMOL/L — SIGNIFICANT CHANGE UP (ref 98–107)
CO2 SERPL-SCNC: 24 MMOL/L — SIGNIFICANT CHANGE UP (ref 22–31)
CREAT SERPL-MCNC: 1.38 MG/DL — HIGH (ref 0.5–1.3)
GLUCOSE SERPL-MCNC: 103 MG/DL — HIGH (ref 70–99)
HCT VFR BLD CALC: 35.8 % — LOW (ref 39–50)
HGB BLD-MCNC: 11.4 G/DL — LOW (ref 13–17)
INR BLD: 1.02 — SIGNIFICANT CHANGE UP (ref 0.88–1.17)
MCHC RBC-ENTMCNC: 31.8 % — LOW (ref 32–36)
MCHC RBC-ENTMCNC: 32 PG — SIGNIFICANT CHANGE UP (ref 27–34)
MCV RBC AUTO: 100.6 FL — HIGH (ref 80–100)
NRBC # FLD: 0 K/UL — SIGNIFICANT CHANGE UP (ref 0–0)
PLATELET # BLD AUTO: 155 K/UL — SIGNIFICANT CHANGE UP (ref 150–400)
PMV BLD: 11 FL — SIGNIFICANT CHANGE UP (ref 7–13)
POTASSIUM SERPL-MCNC: 4.2 MMOL/L — SIGNIFICANT CHANGE UP (ref 3.5–5.3)
POTASSIUM SERPL-SCNC: 4.2 MMOL/L — SIGNIFICANT CHANGE UP (ref 3.5–5.3)
PROTHROM AB SERPL-ACNC: 11.3 SEC — SIGNIFICANT CHANGE UP (ref 9.8–13.1)
RBC # BLD: 3.56 M/UL — LOW (ref 4.2–5.8)
RBC # FLD: 13.6 % — SIGNIFICANT CHANGE UP (ref 10.3–14.5)
RH IG SCN BLD-IMP: POSITIVE — SIGNIFICANT CHANGE UP
SODIUM SERPL-SCNC: 144 MMOL/L — SIGNIFICANT CHANGE UP (ref 135–145)
WBC # BLD: 5.94 K/UL — SIGNIFICANT CHANGE UP (ref 3.8–10.5)
WBC # FLD AUTO: 5.94 K/UL — SIGNIFICANT CHANGE UP (ref 3.8–10.5)

## 2019-04-04 RX ORDER — ENOXAPARIN SODIUM 100 MG/ML
30 INJECTION SUBCUTANEOUS DAILY
Qty: 0 | Refills: 0 | Status: DISCONTINUED | OUTPATIENT
Start: 2019-04-04 | End: 2019-04-05

## 2019-04-04 RX ORDER — SODIUM CHLORIDE 9 MG/ML
1000 INJECTION INTRAMUSCULAR; INTRAVENOUS; SUBCUTANEOUS
Qty: 0 | Refills: 0 | Status: DISCONTINUED | OUTPATIENT
Start: 2019-04-04 | End: 2019-04-05

## 2019-04-04 RX ADMIN — Medication 325 MILLIGRAM(S): at 15:07

## 2019-04-04 RX ADMIN — Medication 975 MILLIGRAM(S): at 22:42

## 2019-04-04 RX ADMIN — Medication 975 MILLIGRAM(S): at 15:07

## 2019-04-04 RX ADMIN — Medication 975 MILLIGRAM(S): at 06:13

## 2019-04-04 RX ADMIN — PANTOPRAZOLE SODIUM 40 MILLIGRAM(S): 20 TABLET, DELAYED RELEASE ORAL at 06:12

## 2019-04-04 RX ADMIN — Medication 200 MILLIGRAM(S): at 06:13

## 2019-04-04 RX ADMIN — Medication 120 MILLIGRAM(S): at 06:13

## 2019-04-04 RX ADMIN — FINASTERIDE 5 MILLIGRAM(S): 5 TABLET, FILM COATED ORAL at 15:07

## 2019-04-04 RX ADMIN — Medication 200 MILLIGRAM(S): at 18:11

## 2019-04-04 RX ADMIN — SENNA PLUS 2 TABLET(S): 8.6 TABLET ORAL at 22:41

## 2019-04-04 RX ADMIN — Medication 88 MICROGRAM(S): at 06:12

## 2019-04-04 NOTE — DISCHARGE NOTE NURSING/CASE MANAGEMENT/SOCIAL WORK - NSDCPECAREGIVERED_GEN_ALL_CORE
Yes/hip fracture, heart failure hip fracture, heart failure, Medline and carenotes for hip fracture as well as DC Medications and side effects literature for patient reference. Skin care measures to maintain skin integrity and prevention of pressure injuries reviewed with HHA at bedside with Teach-back/Yes

## 2019-04-04 NOTE — PHYSICAL THERAPY INITIAL EVALUATION ADULT - MANUAL MUSCLE TESTING RESULTS, REHAB EVAL
Bilateral UE grossly 3/5 , Left LE grossly 3/5, Right LE not assessed Unable to formerly assess, pt. with difficulty following commands.

## 2019-04-04 NOTE — PHYSICAL THERAPY INITIAL EVALUATION ADULT - RANGE OF MOTION EXAMINATION, REHAB EVAL
bilateral upper extremity ROM was WFL (within functional limits)/Left LE ROM was WFL (within functional limits)/Right LE ankle WFL, Right LE knee and hip not assessed

## 2019-04-04 NOTE — PHYSICAL THERAPY INITIAL EVALUATION ADULT - GENERAL OBSERVATIONS, REHAB EVAL
Consult received, chart reviewed. Patient received supine in bed, NAD, Caregiver present. Patient agreed to Evaluation from Physical Therapist.

## 2019-04-04 NOTE — PHYSICAL THERAPY INITIAL EVALUATION ADULT - ADDITIONAL COMMENTS
Pt. is poor historian social history obtained from caregiver at bedside. Prior to admission pt. was non-ambulatory and required mechanical lift for transfers from hospital bed to wheelchair for the past 6 months. Pt. needed assistance with all ADL.     Pt. was left supine in bed post PT evaluation, NAD, call bell within reach, caregiver present, all lines intact. KUNAL Webster made aware of pt. status and participation in PT.

## 2019-04-04 NOTE — PROGRESS NOTE ADULT - ATTENDING COMMENTS
Extremity: skin intact without ecchymosis R hip, residual shortening R LE, mild tenderness R hip region, nontender R thigh / leg, calves soft and nontender.  - Although initially wishing to proceed with surgical intervention R hip HA, during follow-up telephone call made to patient's HCP on 4/4/19 (by request), Freddie Mares now wishing conservative management aware potential course / sequelae secondary to decision and surgery accordingly canceled.  All questions answered.  - Continue mobilization / transfers of patient from bed to chair as before, conditioning exercises and rehabilitation, DVT prophylaxis, discharge planning with outpatient follow-up.

## 2019-04-04 NOTE — DISCHARGE NOTE NURSING/CASE MANAGEMENT/SOCIAL WORK - NSDCCRTYPESERV_GEN_ALL_CORE_FT
aide 7days 24hrs (has 2 12hrs aides);aide will arrive at hosp room 11am 4/5 to accompany pt home in ambulance

## 2019-04-04 NOTE — PHYSICAL THERAPY INITIAL EVALUATION ADULT - DISCHARGE DISPOSITION, PT EVAL
Home with no skilled PT needs. Skilled, therapeutic PT intervention not indicated at this time secondary to pt. at baseline and not following commands. Pt. will be discharged from PT program.

## 2019-04-04 NOTE — DISCHARGE NOTE NURSING/CASE MANAGEMENT/SOCIAL WORK - NSDCPNINST_GEN_ALL_CORE
Follow up with Dr. Mae. Turn and position patient every 2-4 hours to prevent skin breakdown. Follow up with Dr. Mae. Turn and position patient every 2-4 hours to prevent skin breakdown.  Avoid strenuous activity and constipation which may be a side effect from taking certain medications and narcotics.  Continue safety and fall prevention measures as instructed to avoid injury. Continue skin care measures for prevention of pressure injury.

## 2019-04-04 NOTE — PHYSICAL THERAPY INITIAL EVALUATION ADULT - PERTINENT HX OF CURRENT PROBLEM, REHAB EVAL
Pt. admitted for right hip fracture. Per radiology report right femoral neck fracture. PMHx of PPM, dementia, afib, listed h/o IVC filter, BPH, HTN, hypothyroid, CKD.

## 2019-04-04 NOTE — DISCHARGE NOTE NURSING/CASE MANAGEMENT/SOCIAL WORK - NSDCPNDISPN_GEN_ALL_CORE
Side effects of pain management treatment/Activities of daily living, including home environment that might     exacerbate pain or reduce effectiveness of the pain management plan of care as well as strategies to address these issues/Education provided on the pain management plan of care

## 2019-04-04 NOTE — PHYSICAL THERAPY INITIAL EVALUATION ADULT - CRITERIA FOR SKILLED THERAPEUTIC INTERVENTIONS
anticipated discharge recommendation/impairments found rehab potential/anticipated discharge recommendation/impairments found

## 2019-04-04 NOTE — DISCHARGE NOTE NURSING/CASE MANAGEMENT/SOCIAL WORK - NSDPDISTO_GEN_ALL_CORE
Patient alert and oriented X 1. Skin is intact. Z flow boots on for pressure relief. Condom catheter in place for incontinence of urine. Pts. private aide at bedside./Home with home care Patient alert and oriented X 1. Skin is intact. Z flow boots on for pressure relief. Condom catheter in place for incontinence of urine. Pts. private aide at bedside.Skin intact. Protective  Foam dressings applied to bony prominences of elbows, heels and sacrum with instruction and teach-back with HHA at bedside./Home with home care

## 2019-04-04 NOTE — DISCHARGE NOTE NURSING/CASE MANAGEMENT/SOCIAL WORK - NSDCDPATPORTLINK_GEN_ALL_CORE
You can access the [facility] Patient Portal, offered by [facility], by registering with the following website: [email or web address]

## 2019-04-04 NOTE — PROGRESS NOTE ADULT - SUBJECTIVE AND OBJECTIVE BOX
Orthopedic Surgery Progress Note  No acute events overnight.  Pain well controlled.  No chest pain, shortness of breath, light-headedness.    O:  Vital Signs Last 24 Hrs  T(C): 36.4 (03 Apr 2019 21:11), Max: 36.6 (03 Apr 2019 10:01)  T(F): 97.5 (03 Apr 2019 21:11), Max: 97.9 (03 Apr 2019 10:01)  HR: 70 (03 Apr 2019 21:11) (70 - 71)  BP: 120/65 (03 Apr 2019 21:11) (120/65 - 149/65)  BP(mean): --  RR: 18 (03 Apr 2019 21:11) (18 - 18)  SpO2: 98% (03 Apr 2019 21:11) (98% - 99%)    Gen: NAD  RLE  EHL/FHL/TA/GS intact  SILT DP/SP/SCHWARZ/Sa  WWP distally    Labs:                        11.9   7.03  )-----------( 132      ( 03 Apr 2019 05:20 )             38.5                         11.9   8.06  )-----------( 147      ( 02 Apr 2019 20:15 )             37.3       04-03    143  |  105  |  42<H>  ----------------------------<  109<H>  3.9   |  24  |  1.38<H>        PT/INR - ( 02 Apr 2019 20:15 )   PT: 11.2 SEC;   INR: 1.01          PTT - ( 02 Apr 2019 20:15 )  PTT:28.6 SEC    A/P 89y year old male with right femoral neck fracture  Pain Control  Hold DVT PPx  NPO  IVF  OR today  PT/OOB  NWB RLE

## 2019-04-05 VITALS
RESPIRATION RATE: 18 BRPM | SYSTOLIC BLOOD PRESSURE: 113 MMHG | HEART RATE: 71 BPM | DIASTOLIC BLOOD PRESSURE: 51 MMHG | TEMPERATURE: 98 F | OXYGEN SATURATION: 98 %

## 2019-04-05 PROCEDURE — 99233 SBSQ HOSP IP/OBS HIGH 50: CPT

## 2019-04-05 PROCEDURE — 99238 HOSP IP/OBS DSCHRG MGMT 30/<: CPT

## 2019-04-05 RX ORDER — FAMOTIDINE 10 MG/ML
1 INJECTION INTRAVENOUS
Qty: 0 | Refills: 0 | COMMUNITY

## 2019-04-05 RX ORDER — ASPIRIN/CALCIUM CARB/MAGNESIUM 324 MG
1 TABLET ORAL
Qty: 0 | Refills: 0 | COMMUNITY

## 2019-04-05 RX ORDER — DOCUSATE SODIUM 100 MG
20 CAPSULE ORAL
Qty: 0 | Refills: 0 | COMMUNITY
Start: 2019-04-05

## 2019-04-05 RX ORDER — ACETAMINOPHEN 500 MG
2 TABLET ORAL
Qty: 0 | Refills: 0 | COMMUNITY
Start: 2019-04-05

## 2019-04-05 RX ADMIN — Medication 200 MILLIGRAM(S): at 06:48

## 2019-04-05 RX ADMIN — ENOXAPARIN SODIUM 30 MILLIGRAM(S): 100 INJECTION SUBCUTANEOUS at 06:48

## 2019-04-05 RX ADMIN — Medication 88 MICROGRAM(S): at 06:48

## 2019-04-05 RX ADMIN — Medication 120 MILLIGRAM(S): at 06:48

## 2019-04-05 RX ADMIN — Medication 975 MILLIGRAM(S): at 06:48

## 2019-04-05 RX ADMIN — PANTOPRAZOLE SODIUM 40 MILLIGRAM(S): 20 TABLET, DELAYED RELEASE ORAL at 06:48

## 2019-04-05 NOTE — PROGRESS NOTE ADULT - ASSESSMENT
89M DNR/DNI h/o PPM, dementia, Afib off xarelto for last year 2/2 GI bleed, listed h/o IVC filter, BPH, HTN, hypothyroid, CKD admitted for acute right hip fx, initially planned for surgical repair but will now pursue non-operative management. Mr. Hardy is a 89 yo man with PMHx notable for h/o PPM, dementia, Afib off xarelto for last year 2/2 GI bleed, listed h/o IVC filter, BPH, HTN, hypothyroid, and CKD admitted for acute right hip fx, initially planned for surgical repair but will now pursue non-operative management.

## 2019-04-05 NOTE — DISCHARGE NOTE PROVIDER - NSDCFUADDINST_GEN_ALL_CORE_FT
non weight bearing to Right leg  may have out of bed to chair transfers with pivoting off of good leg (Left side)

## 2019-04-05 NOTE — PROGRESS NOTE ADULT - PROBLEM SELECTOR PLAN 2
-c/w verapamil home dose daily   -BP well controlled
-c/w verapamil home dose daily   -BP well controlled

## 2019-04-05 NOTE — DISCHARGE NOTE PROVIDER - CARE PROVIDER_API CALL
Jc Mae)  Orthopaedic Surgery  [address]  [place] [address]  Phone: [phone]  Fax: [phone]  Follow Up Time: 2 weeks

## 2019-04-05 NOTE — PROGRESS NOTE ADULT - PROBLEM SELECTOR PROBLEM 1
Subcapital fracture of femur, right, closed, initial encounter
Subcapital fracture of femur, right, closed, initial encounter

## 2019-04-05 NOTE — DISCHARGE NOTE PROVIDER - NSDCCPCAREPLAN_GEN_ALL_CORE_FT
PRINCIPAL DISCHARGE DIAGNOSIS  Diagnosis: Subcapital fracture of femur, right, closed, initial encounter  Assessment and Plan of Treatment: 89year old male is admitted for Right hip subcapital fracture treated conservatively as patient is non-ambulator at baseline.  Patient is doing well and stable for discharge.  Patient is tolerating physical therapy: non-weight bearing to Right leg, may to out of bed to chair transfers if pivot off of Left leg.  Patient is on Aspirin (MUST TAKE WITH FOOD) for anticoagulation.  Orthopaedic Surgeon Dr. Mae would like patient to call private MD/Internist for appointment postop to maintain continuity of care.  Follow up with Dr. Mae' office in 1-2 weeks.

## 2019-04-05 NOTE — PROGRESS NOTE ADULT - PROBLEM SELECTOR PLAN 1
Currently hemodynamically stable. Of note, HCP in agreement to revoke DNR/I for procedure" Currently hemodynamically stable. Pain controlled. No longer pursuing surgical intervention as per HCP request.   - management as per ortho

## 2019-04-05 NOTE — PROGRESS NOTE ADULT - SUBJECTIVE AND OBJECTIVE BOX
Orthopedic Surgery Progress Note  No acute events overnight.  Pain well controlled.  No chest pain, shortness of breath, light-headedness.    O:  Vital Signs Last 24 Hrs  T(C): 36.6 (05 Apr 2019 06:45), Max: 36.7 (04 Apr 2019 22:31)  T(F): 97.8 (05 Apr 2019 06:45), Max: 98 (04 Apr 2019 22:31)  HR: 70 (05 Apr 2019 06:45) (70 - 73)  BP: 128/71 (05 Apr 2019 06:45) (112/56 - 138/74)  BP(mean): --  RR: 18 (05 Apr 2019 06:45) (18 - 18)  SpO2: 98% (05 Apr 2019 06:45) (98% - 100%)    Gen: NAD  RLE  EHL/FHL/TA/GS intact  SILT DP/SP/SCHWARZ/Sa  WWP distally      A/P 89y year old male with right femoral neck fracture  Pain Control  Family elected for nonop management  PT/OOB  NWB RLE   Reg Diet

## 2019-04-05 NOTE — PROGRESS NOTE ADULT - REASON FOR ADMISSION
right hip fracture

## 2019-04-05 NOTE — PROGRESS NOTE ADULT - PROBLEM SELECTOR PLAN 6
c/w lovenox subq daily; hold in am    CONTACTS:    Slim Noble  [phone]  Freddie Castanon- cardiology  [phone]  MATT Garcia - [phone]  Freddie [place] -[facility] Proxy- [phone]  Ora Hernandez : HHA for 3 years [phone] - c/w Lovenox for DVT ppx while inpatient     CONTACTS:  Slim Noble  [phone]  Fredide Castanon- cardiology  [phone]  J Francisco J Garcia - [phone]  Freddie [place] -[facility] Proxy- [phone]  Ora Hernandez : HHA for 3 years [phone]    DISPO: There are no contraindications to discharge patient from a medical perspective. Patient is medically stable.

## 2019-04-05 NOTE — PROGRESS NOTE ADULT - SUBJECTIVE AND OBJECTIVE BOX
CHIEF COMPLAINT: f/u     SUBJECTIVE / OVERNIGHT EVENTS: Patient seen and examined. No acute events overnight. Pain well controlled and patient without any complaints.    MEDICATIONS  (STANDING):  acetaminophen   Tablet .. 975 milliGRAM(s) Oral every 8 hours  chlorhexidine 4% Liquid 1 Application(s) Topical once  docusate sodium Liquid 200 milliGRAM(s) Oral two times a day  enoxaparin Injectable 30 milliGRAM(s) SubCutaneous daily  ferrous    sulfate 325 milliGRAM(s) Oral daily  finasteride 5 milliGRAM(s) Oral daily  levothyroxine 88 MICROGram(s) Oral daily  pantoprazole    Tablet 40 milliGRAM(s) Oral before breakfast  senna 2 Tablet(s) Oral at bedtime  sodium chloride 0.9%. 1000 milliLiter(s) (75 mL/Hr) IV Continuous <Continuous>  verapamil  milliGRAM(s) Oral daily    MEDICATIONS  (PRN):  bisacodyl Suppository 10 milliGRAM(s) Rectal daily PRN If no bowel movement by POD#2  magnesium hydroxide Suspension 30 milliLiter(s) Oral daily PRN Constipation  melatonin 3 milliGRAM(s) Oral at bedtime PRN Insomnia  oxyCODONE    IR 2.5 milliGRAM(s) Oral every 4 hours PRN Moderate Pain (4 - 6)  oxyCODONE    IR 5 milliGRAM(s) Oral every 4 hours PRN Severe Pain (7 - 10)  polyethylene glycol 3350 17 Gram(s) Oral daily PRN Constipation      VITALS:  T(F): 97.7 (04-05-19 @ 09:29), Max: 98 (04-04-19 @ 22:31)  HR: 71 (04-05-19 @ 09:29) (70 - 73)  BP: 113/51 (04-05-19 @ 09:29) (112/56 - 138/74)  RR: 18 (04-05-19 @ 09:29) (18 - 18)  SpO2: 98% (04-05-19 @ 09:29)  Wt(kg): --      CAPILLARY BLOOD GLUCOSE    PHYSICAL EXAM:  GENERAL: NAD, well-developed  HEAD:  Atraumatic, Normocephalic  EYES: EOMI, PERRLA, conjunctiva and sclera clear  NECK: Supple, No JVD  CHEST/LUNG: Clear to auscultation bilaterally; No wheeze  HEART: Regular rate and rhythm; No murmurs, rubs, or gallops  ABDOMEN: Soft, Nontender, Nondistended; Bowel sounds present  EXTREMITIES:  2+ Peripheral Pulses, No clubbing, cyanosis, or edema  PSYCH: AAOx3  NEUROLOGY: non-focal  SKIN: No rashes or lesions    LABS:              11.4                 144  | 24   | 46           5.94  >-----------< 155     ------------------------< 103                   35.8                 4.2  | 107  | 1.38                                         Ca 9.6   Mg x     Ph x            INR: 1.02 ;    PT: 11.3 SEC;    PTT: 24.1 SEC<L>          [ ] Consultant(s) Notes Reviewed:  [x] Care Discussed with Consultants/Other Providers: Orthopedic PA - discussed CHIEF COMPLAINT: f/u     SUBJECTIVE / OVERNIGHT EVENTS:   Patient seen and examined. No acute events overnight. Pain well controlled and patient without any complaints. Patient's HHA present at bedside and confirmed pt appears to be at baseline. Patient is eating well without n/v or abdominal pain.     MEDICATIONS  (STANDING):  acetaminophen   Tablet .. 975 milliGRAM(s) Oral every 8 hours  chlorhexidine 4% Liquid 1 Application(s) Topical once  docusate sodium Liquid 200 milliGRAM(s) Oral two times a day  enoxaparin Injectable 30 milliGRAM(s) SubCutaneous daily  ferrous    sulfate 325 milliGRAM(s) Oral daily  finasteride 5 milliGRAM(s) Oral daily  levothyroxine 88 MICROGram(s) Oral daily  pantoprazole    Tablet 40 milliGRAM(s) Oral before breakfast  senna 2 Tablet(s) Oral at bedtime  sodium chloride 0.9%. 1000 milliLiter(s) (75 mL/Hr) IV Continuous <Continuous>  verapamil  milliGRAM(s) Oral daily    MEDICATIONS  (PRN):  bisacodyl Suppository 10 milliGRAM(s) Rectal daily PRN If no bowel movement by POD#2  magnesium hydroxide Suspension 30 milliLiter(s) Oral daily PRN Constipation  melatonin 3 milliGRAM(s) Oral at bedtime PRN Insomnia  oxyCODONE    IR 2.5 milliGRAM(s) Oral every 4 hours PRN Moderate Pain (4 - 6)  oxyCODONE    IR 5 milliGRAM(s) Oral every 4 hours PRN Severe Pain (7 - 10)  polyethylene glycol 3350 17 Gram(s) Oral daily PRN Constipation      VITALS:  T(F): 97.7 (04-05-19 @ 09:29), Max: 98 (04-04-19 @ 22:31)  HR: 71 (04-05-19 @ 09:29) (70 - 73)  BP: 113/51 (04-05-19 @ 09:29) (112/56 - 138/74)  RR: 18 (04-05-19 @ 09:29) (18 - 18)  SpO2: 98% (04-05-19 @ 09:29)      PHYSICAL EXAM:  GENERAL: thin framed, NAD, not ambulatory  EYES: EOMI, normal conjunctiva and sclera clear  MOUTH: multiple dental caries and missing teeth. Moist mucous membranes  CHEST/LUNG: Clear to auscultation bilaterally; No wheeze  HEART: Regular rate and rhythm; No murmurs, rubs, or gallops  ABDOMEN: Soft, Nontender, Nondistended; Bowel sounds present  EXTREMITIES:  2+ Peripheral Pulses, No clubbing, cyanosis, or peripheral edema  PSYCH: AAOx3        LABS:              11.4                 144  | 24   | 46           5.94  >-----------< 155     ------------------------< 103                   35.8                 4.2  | 107  | 1.38                                         Ca 9.6   Mg x     Ph x            INR: 1.02 ;    PT: 11.3 SEC;    PTT: 24.1 SEC<L>        [ ] Consultant(s) Notes Reviewed:  [x] Care Discussed with Consultants/Other Providers: Orthopedic PA - discussed

## 2019-04-05 NOTE — PROGRESS NOTE ADULT - PROBLEM SELECTOR PLAN 4
-unclear baseline  -HHA notes normal voiding habits-  -creatinine downtrending, electrolytes wnl -- will continue to monitor. Patient with stage 3 CKD. Cr currently at baseline range. HHA notes normal voiding habits. Electrolytes wnl   - no need for further management or w/u at this time.

## 2019-04-05 NOTE — DISCHARGE NOTE PROVIDER - HOSPITAL COURSE
89year old male is admitted for Right hip subcapital fracture treated conservatively as patient is non-ambulator at baseline.  Patient is doing well and stable for discharge.  Patient is tolerating physical therapy: non-weight bearing to Right leg, may to out of bed to chair transfers if pivot off of Left leg.  Patient is on Aspirin (MUST TAKE WITH FOOD) for anticoagulation.  Orthopaedic Surgeon Dr. Mae would like patient to call private MD/Internist for appointment postop to maintain continuity of care.  Follow up with Dr. Mae' office in 1-2 weeks.

## 2019-07-14 NOTE — PHYSICAL THERAPY INITIAL EVALUATION ADULT - LEVEL OF INDEPENDENCE: SIT/STAND, REHAB EVAL
Subjective   Radha Varela reports: last treatment did help LBP a lot, but still has pain down left LE to foot.    Objective   POSTURE: pt is in complete spinal kyphosis of about 50-60 degrees.  PALPATION: tender left > right today, left SI joint tender too.  GAIT: complete trunk flexion, uses a single point cane, appears to be antalgic.    See Exercise, Manual, and Modality Logs for complete treatment.       Assessment/Plan  Responding well to 90/90 lumbar traction. Still has nerve root symptoms.  Progress per Plan of Care and Progress strengthening /stabilization /functional activity           Manual Therapy:         mins  28988;  Therapeutic Exercise:         mins  85683;     Neuromuscular Simone:        mins  07901;    Therapeutic Activity:          mins  62224;     Gait Training:           mins  79750;     Ultrasound:     15     mins  54929;   Iontophoresis          mins  04723   Electrical Stimulation:    20     mins  68107 ( );  Dry Needling          mins self-pay  Fluidotherapy          mins [id number]  Traction     20     mins 82163  Paraffin:          mins  51930    Timed Treatment:   15   mins   Total Treatment:     55   mins    Magdiel Carty, PT  Physical Therapist  
TBD

## 2021-04-06 NOTE — H&P ADULT - NSHPLABSRESULTS_GEN_ALL_CORE
T(C): 36.4 (04-02-19 @ 17:45)  HR: 61 (04-02-19 @ 17:45)  BP: 150/61 (04-02-19 @ 17:45)  RR: 18 (04-02-19 @ 17:45)  SpO2: 97% (04-02-19 @ 17:45)  Wt(kg): --                        11.9   8.06  )-----------( 147      ( 02 Apr 2019 20:15 )             37.3     04-02    144  |  105  |  44<H>  ----------------------------<  111<H>  4.3   |  26  |  1.58<H>    Ca    9.9      02 Apr 2019 21:25  Phos  3.2     04-02  Mg     2.4     04-02    TPro  8.3  /  Alb  3.9  /  TBili  0.9  /  DBili  x   /  AST  44<H>  /  ALT  17  /  AlkPhos  59  04-02    PT/INR - ( 02 Apr 2019 20:15 )   PT: 11.2 SEC;   INR: 1.01          PTT - ( 02 Apr 2019 20:15 )  PTT:28.6 SEC (2) Patient Placed in Bed

## 2021-08-06 NOTE — ED PROVIDER NOTE - OBJECTIVE STATEMENT
89 yo from home with HHA.  Reports that he has had a cough for a few days and some SOB.  No phlegm.  Eating less than usual and less active than usual as well.  No other sick contacts.  Some increase in smell of urine Review of Systems    Constitutional: (-) fever   Eyes/ENT: (-) vision changes  Cardiovascular: (-) chest pain, (-) syncope (-) palpitations  Respiratory: (-) cough, (-) shortness of breath  Gastrointestinal: (-) vomiting  Musculoskeletal: (-) neck pain  Neurological: (-) headache

## 2022-06-09 NOTE — CONSULT NOTE ADULT - PROBLEM SELECTOR RECOMMENDATION 9
[facility]  Cardiology Progress Note        Katie Mina, female  : 1932  PCP: Tha Fowler MD  Attending/Consulting Provider: Sarai Vines MD     SUBJECTIVE:   - Asked to re-evaluate the patient for CHF exacerbation.  - TTE completed yesterday EF 10%, AV moderate stenosis, mod-severe mitral regurg, RV pacemaker lead in RV, and left pleural effusion  - Pt seen and examined at bedside. She opens eyes to voice and looks in direction of physician, but no meaningful conversation. Moaning and appears uncomfortable.    Review of Systems:  Review of Systems   Unable to perform ROS: patient nonverbal     Medications:  Prior to Admission medications    Medication Sig Start Date End Date Taking? Authorizing Provider   Eliquis 2.5 MG Tab Take 2.5 mg by mouth 2 (two) times a day. 22  Yes Outside Provider   bethanechol (URECHOLINE) 25 MG tablet Take 50 mg by mouth 2 (two) times a day. 22  Yes Outside Provider   escitalopram (LEXAPRO) 10 MG tablet Take 10 mg by mouth at bedtime. 22  Yes Outside Provider   metoPROLOL succinate (TOPROL-XL) 25 MG 24 hr tablet Take 25 mg by mouth every morning. 22  Yes Outside Provider   mirtazapine (REMERON) 15 MG tablet Take 15 mg by mouth every morning. 22  Yes Outside Provider   olopatadine (PATANOL) 0.1 % ophthalmic solution Place 1 drop into both eyes 2 (two) times a day. 22  Yes Outside Provider   acetaminophen (TYLENOL) 325 MG tablet Take 325 mg by mouth every 6 hours as needed for Pain or Fever.   Yes Outside Provider   CARBOXYMethylcellulose (REFRESH PLUS) 1 % ophthalmic solution Place 1 drop into both eyes every 12 hours as needed (dry eyes).   Yes Outside Provider   aspirin (ECOTRIN) 81 MG EC tablet Take 81 mg by mouth every morning.   Yes Outside Provider   magnesium hydroxide (MILK OF MAGNESIA) 800 MG/5ML suspension Take 30 mLs by mouth daily as needed for Constipation.   Yes Outside Provider   Nutritional Supplements (Ensure  Original) Liquid Take 270 mLs by mouth 2 times daily.   Yes Outside Provider   Multiple Vitamin (Multivitamin Adult) Tab Take 1 tablet by mouth every morning.   Yes Outside Provider   PSYLLIUM PO Take 1 packet by mouth daily (before breakfast).   Yes Outside Provider   docusate sodium-sennosides (SENOKOT S) 50-8.6 MG per tablet Take 1 tablet by mouth at bedtime. 6/3/22   Vidya Mosqueda MD   atorvastatin (LIPITOR) 20 MG tablet Take 0.5 tablets by mouth at bedtime. 6/3/22   Vidya Mosqueda MD   levothyroxine 50 MCG tablet Take 1 tablet by mouth every morning. 6/3/22   Vidya Mosqueda MD   furosemide (LASIX) 20 MG tablet Take 1 tablet by mouth daily. 6/3/22   Vidya Mosqueda MD   metoPROLOL succinate (TOPROL-XL) 50 MG 24 hr tablet Take 1 tablet by mouth every morning. Do not start before June 4, 2022. 6/4/22   Vidya Mosqueda MD       Current Facility-Administered Medications   Medication Dose Route Frequency Provider Last Rate Last Admin   • albumin human (SPA) 25 % injection 25 g  25 g Intravenous BID Shayla Paz MD       • furosemide (LASIX INJECT) injection 80 mg  80 mg Intravenous BID Shayla Paz MD       • dextrose 5 % infusion   Intravenous Continuous Shayla Paz MD 60 mL/hr at 06/09/22 1122 New Bag at 06/09/22 1122   • ipratropium-albuterol (DUONEB) 0.5-2.5 (3) MG/3ML nebulizer solution 3 mL  3 mL Nebulization Q6H Resp PRN Annabel Pantoja MD       • cefepime (MAXIPIME) 1,000 mg in sodium chloride 0.9 % 100 mL IVPB  1,000 mg Intravenous Daily Annabel Pantoja  mL/hr at 06/09/22 0918 1,000 mg at 06/09/22 0918   • [Held by provider] metoPROLOL succinate (TOPROL-XL) ER tablet 25 mg  25 mg Oral QAM Annabel Pantoja MD       • enoxaparin (LOVENOX) injection 50 mg  1 mg/kg Subcutaneous Daily Annabel Pantoja MD   50 mg at 06/08/22 2020   • metoPROLOL (LOPRESSOR) injection 1.3 mg  1.3 mg Intravenous 4 times per day Annabel Pantoja MD   1.3 mg at 06/09/22 0617   • acetaminophen (TYLENOL) suppository  325 mg  325 mg Rectal Q4H PRN Annabel Pantoja MD       • dextrose 50 % injection 25 g  25 g Intravenous PRN Vidya Mosqueda MD       • dextrose 50 % injection 12.5 g  12.5 g Intravenous PRN Vidya Mosqueda MD       • glucagon (GLUCAGEN) injection 1 mg  1 mg Intramuscular PRN Vidya Mosqueda MD       • dextrose (GLUTOSE) 40 % gel 15 g  15 g Oral PRN Vidya Mosqueda MD       • dextrose (GLUTOSE) 40 % gel 30 g  30 g Oral PRN Vidya Mosqueda MD       • [Held by provider] atorvastatin (LIPITOR) tablet 20 mg  20 mg Oral QHS Letty Vallejo MD   20 mg at 06/03/22 2009   • bethanechol (URECHOLINE) tablet 50 mg  50 mg Oral BID AC Letty Vallejo MD   50 mg at 06/04/22 0626   • CARBOXYMethylcellulose (REFRESH TEARS) 0.5 % ophthalmic solution 2 drop  2 drop Both Eyes BID Letty Vallejo MD   2 drop at 06/09/22 0923   • docusate sodium-sennosides (SENOKOT S) 50-8.6 MG 2 tablet  2 tablet Oral QHS Letty Vallejo MD   2 tablet at 06/03/22 2010   • [Held by provider] apixaBAN (ELIQUIS) tablet 2.5 mg  2.5 mg Oral 2 times per day Letty Vallejo MD   2.5 mg at 06/06/22 1123   • [Held by provider] escitalopram (LEXAPRO) tablet 10 mg  10 mg Oral QHS Letty Vallejo MD   10 mg at 06/03/22 2010   • levothyroxine (SYNTHROID, LEVOTHROID) tablet 50 mcg  50 mcg Oral QAM Letty Vallejo MD   50 mcg at 06/04/22 0626   • [Held by provider] mirtazapine (REMERON) tablet 15 mg  15 mg Oral QAFELIPE Vallejo MD   15 mg at 06/04/22 0831   • ketotifen (ZADITOR) 0.025 % ophthalmic solution 1 drop  1 drop Both Eyes BID Letty Vallejo MD   1 drop at 06/09/22 0923   • sodium chloride 0.9 % flush bag 25 mL  25 mL Intravenous PRN Letty Vallejo MD       • sodium chloride (PF) 0.9 % injection 2 mL  2 mL Intracatheter 2 times per day Letty Vallejo MD   2 mL at 06/09/22 0920   • sodium chloride (NORMAL SALINE) 0.9 % bolus 500 mL  500 mL Intravenous PRN Letty Vallejo MD       • sodium chloride 0.9 % flush bag 25 mL  25 mL Intravenous  PRN Letty Vallejo MD       • prochlorperazine (COMPAZINE) injection 5 mg  5 mg Intravenous Q4H PRN Letty Vallejo MD       • acetaminophen (TYLENOL) tablet 650 mg  650 mg Oral Q4H PRN Letty Vallejo MD   650 mg at 06/04/22 0626   • HYDROcodone-acetaminophen (NORCO) 5-325 MG per tablet 1 tablet  1 tablet Oral Q4H PRN Letty Vallejo MD   1 tablet at 06/04/22 0831   • polyethylene glycol (MIRALAX) packet 17 g  17 g Oral Daily PRN Letty Vallejo MD       • bisacodyl (DULCOLAX) suppository 10 mg  10 mg Rectal Daily PRN Letty Vallejo MD             OBJECTIVE:       Vital Last Value 24 Hour Range   Temperature 97.7 °F (36.5 °C) (06/09/22 0800) Temp  Min: 97.2 °F (36.2 °C)  Max: 97.7 °F (36.5 °C)   Pulse 81 (06/09/22 0800) Pulse  Min: 80  Max: 86   Respiratory 16 (06/09/22 0800) Resp  Min: 16  Max: 18   Non-Invasive  Blood Pressure 117/84 (06/09/22 0800) BP  Min: 104/80  Max: 133/92   Pulse Oximetry 97 % (06/09/22 0800) SpO2  Min: 92 %  Max: 97 %   Arterial   Blood Pressure   No data recorded        Intake/Output Summary (Last 24 hours) at 6/9/2022 1343  Last data filed at 6/9/2022 0659  Gross per 24 hour   Intake 801.38 ml   Output 200 ml   Net 601.38 ml        Wt Readings from Last 3 Encounters:   05/31/22 45 kg (99 lb 3.3 oz)         Tele: a fib, no other acute changes at time of review    PHYSICAL EXAMINATION:  Physical Exam  Vitals and nursing note reviewed.   Constitutional:       Appearance: She is ill-appearing.   HENT:      Head: Normocephalic and atraumatic.      Right Ear: External ear normal.      Left Ear: External ear normal.   Eyes:      Extraocular Movements: Extraocular movements intact.      Conjunctiva/sclera: Conjunctivae normal.   Pulmonary:      Effort: Pulmonary effort is normal.      Breath sounds: Transmitted upper airway sounds present. Rales present.   Abdominal:      General: Bowel sounds are normal. There is no distension.      Palpations: Abdomen is soft.      Tenderness: There is no  abdominal tenderness.   Musculoskeletal:      Right lower leg: Edema present.      Left lower leg: Edema present.      Comments: Moving upper extremities, but appears weak.   Skin:     General: Skin is warm and dry.   Neurological:      Mental Status: She is alert.      Comments: Awakens to voice and tracks physician. No meaningful conversation. Moaning. Moving right upper extremity to move supplemental oxygen. No other purposeful movement and does not follow simple commands. No obvious facial asymmetry.       DIAGNOSTIC STUDIES:     Labs:  Recent Results (from the past 24 hour(s))   GLUCOSE, BEDSIDE - POINT OF CARE    Collection Time: 06/08/22  5:38 PM   Result Value Ref Range    GLUCOSE, BEDSIDE - POINT OF CARE 114 (H) 70 - 99 mg/dL   GLUCOSE, BEDSIDE - POINT OF CARE    Collection Time: 06/09/22  1:04 AM   Result Value Ref Range    GLUCOSE, BEDSIDE - POINT OF CARE 126 (H) 70 - 99 mg/dL   GLUCOSE, BEDSIDE - POINT OF CARE    Collection Time: 06/09/22  6:45 AM   Result Value Ref Range    GLUCOSE, BEDSIDE - POINT OF CARE 104 (H) 70 - 99 mg/dL   Basic Metabolic Panel    Collection Time: 06/09/22  9:25 AM   Result Value Ref Range    Fasting Status      Sodium 141 135 - 145 mmol/L    Potassium 3.8 3.4 - 5.1 mmol/L    Chloride 112 (H) 97 - 110 mmol/L    Carbon Dioxide 18 (L) 21 - 32 mmol/L    Anion Gap 15 7 - 19 mmol/L    Glucose 124 (H) 70 - 99 mg/dL    BUN 56 (H) 6 - 20 mg/dL    Creatinine 1.19 (H) 0.51 - 0.95 mg/dL    Glomerular Filtration Rate 43 (L) >=60    BUN/ Creatinine Ratio 47 (H) 7 - 25    Calcium 8.8 8.4 - 10.2 mg/dL   Hepatic Function Panel    Collection Time: 06/09/22  9:25 AM   Result Value Ref Range    Albumin 3.5 (L) 3.6 - 5.1 g/dL    Bilirubin, Total 1.1 (H) 0.2 - 1.0 mg/dL    Bilirubin, Direct 0.5 (H) 0.0 - 0.2 mg/dL    Alkaline Phosphatase 449 (H) 45 - 117 Units/L    GPT/ (H) <64 Units/L    GOT/ (H) <=37 Units/L    Protein, Total 6.2 (L) 6.4 - 8.2 g/dL   CBC No Differential    Collection  Time: 22  9:25 AM   Result Value Ref Range    WBC 6.8 4.2 - 11.0 K/mcL    RBC 3.11 (L) 4.00 - 5.20 mil/mcL    HGB 9.4 (L) 12.0 - 15.5 g/dL    HCT 30.4 (L) 36.0 - 46.5 %    MCV 97.7 78.0 - 100.0 fl    MCH 30.2 26.0 - 34.0 pg    MCHC 30.9 (L) 32.0 - 36.5 g/dL     (L) 140 - 450 K/mcL    RDW-CV 18.8 (H) 11.0 - 15.0 %    RDW-SD 62.9 (H) 39.0 - 50.0 fL    NRBC 5 (H) <=0 /100 WBC     Data:  Results for orders placed during the hospital encounter of 22    TRANSTHORACIC ECHO (TTE) COMPLETE W/ W/O IMAGING AGENT    Impression  *[facility]*  [address] [place] [address]  Transthoracic Echocardiogram (TTE)    Patient: Katie Mina    Study Date/Time:      2022 9:45AM  MRN:     [id number]           FIN#:                 [id number]  :     1932         Ht/Wt:                152.4cm 44.9kg  Age:     89                 BSA/BMI:              1.38m^2 19.3kg/m^2  Gender:  F                  Baseline BP:          122 / 100  Ordering Physician:   Annabel Pantoja    Attending Physician:  Sarai Vines  Performing Physician: Alex Rizzo MD  Diagnostic Physician: Alex Rizzo MD  Sonographer:          Jayde Quintanilla    --------------------------------------------------------------------------  INDICATIONS:   Exacerbation of CHF.    --------------------------------------------------------------------------  STUDY CONCLUSIONS  SUMMARY:    1. Left ventricle: The cavity size is normal. Wall thickness is normal.  The ejection fraction was measured by biplane method of disks. The  ejection fraction is 10%. The stroke volume is 17ml. The stroke index  is 12ml/m^2.  2. Aortic valve: The annulus is calcified. The valve is trileaflet. The  leaflets are moderately calcified. There is moderate stenosis. Low  output aortic stenosis. The mean systolic gradient is 5mm Hg. The LVOT  to aortic valve VTI ratio is 0.25. The valve area by the velocity-time  integral method is  0.5cm^2.  3. Mitral valve: Moderate to severe regurgitation.  4. Left atrium: The atrium is moderately dilated.  5. Right ventricle: Pacemaker lead noted in right ventricle. The cavity  size is normal. Wall thickness is normal. Systolic function is normal.  Systolic pressure is increased.  6. Tricuspid valve: Moderate regurgitation.  7. Pericardium, extracardiac: There is no pericardial effusion. There is a  moderate-sized left pleural effusion.    --------------------------------------------------------------------------  STUDY DATA:  There is no prior study available for comparison at this  time.  Procedure:  Transthoracic echocardiography was performed. Image  quality was good.  M-mode, complete 2D, complete spectral Doppler, and  color Doppler.  Study status:  Routine.  Study completion:  There were no  complications.    FINDINGS    LEFT VENTRICLE:  The cavity size is normal. Wall thickness is normal.  Systolic function is severely reduced.    The ejection fraction was  measured by biplane method of disks. The ejection fraction is 10%.  Isovolumic relaxation time is normal.    AORTIC VALVE:  The annulus is calcified. The valve is trileaflet. The  leaflets are moderately calcified. Cusp separation is reduced.  Doppler:  There is moderate stenosis.   Low output aortic stenosis.  Trivial  regurgitation.    The LVOT to aortic valve VTI ratio is 0.25. The valve  area by the velocity-time integral method is 0.5cm^2. The valve area index  by the velocity-time integral method is 0.35cm^2/m^2.    The mean systolic  gradient is 5mm Hg. The peak systolic gradient is 13mm Hg.    AORTA:  Aortic root: The aortic root is normal in size.    MITRAL VALVE:  The annulus is calcified. The leaflets are normal  thickness. Leaflet separation is normal.  Doppler:  Transvalvular velocity  is within the normal range. There is no evidence for stenosis.  Moderate  to severe regurgitation.    The peak diastolic gradient is 2mm Hg.    LEFT  ATRIUM:  The atrium is moderately dilated.    RIGHT VENTRICLE:  Pacemaker lead noted in right ventricle. The cavity size  is normal. Wall thickness is normal. Systolic function is normal. Systolic  pressure is increased.    PULMONIC VALVE:    Structurally normal valve.   Cusp separation is normal.  Doppler:  Transvalvular velocity is within the normal range.  No  significant regurgitation.    TRICUSPID VALVE:   Structurally normal valve.   Leaflet separation is  normal.  Doppler:  Transvalvular velocity is within the normal range.  Moderate regurgitation.    PULMONARY ARTERY:   The main pulmonary artery is normal-sized. Systolic  pressure is within the normal range.    RIGHT ATRIUM:  The atrium is normal in size.    PERICARDIUM:  There is no pericardial effusion. There is a moderate-sized  left pleural effusion.    SYSTEMIC VEINS:  Inferior vena cava: The vessel is normal in size.    --------------------------------------------------------------------------  Measurements    Left ventricle                 Value        Ref        Left atrium                 Value          Ref  BRITNEY, LAX chord        (H)      5.7   cm     3.8 - 5.2  SI dim, A4C                 5.6   cm       ---------  ESD, LAX chord        (H)      5.5   cm     2.2 - 3.5  Area ES, A4C         (H)    25    cm^2     <=20  BRITNEY/bsa, LAX chord    (H)      4.1   cm/m^2 2.3 - 3.1  Area ES, A2C                29    cm^2     ---------  ESD/bsa, LAX chord    (H)      3.9   cm/m^2 1.3 - 2.1  Vol, S               (H)    97    ml       22 - 52  PW, ED, LAX                    0.8   cm     0.6 - 0.9  Vol/bsa, S           (H)    70    ml/m^2   16 - 34  BRITNEY major ax, A4C              9.2   cm     ---------  Vol, ES, 1-p A4C     (H)    83    ml       22 - 52  ESD major ax, A4C              9.4   cm     ---------  Vol/bsa, ES, 1-p A4C (H)    60    ml/m^2   11 - 40  FS major axis, A4C             -2    %      ---------  Vol, ES, 1-p A2C     (H)    111   ml       22 -  -for now planned intervention for Thursday   -Based on aha/acc guidelines, pt high risk for procedure. Currently hemodynamically stable. Pt shortly due for ppm interrogation which should be performed prior to OR. No other intervention/ optimization likely required prior to procedure as long as pt remains hemodynamically stable. Of note, HCP in agreement to revoke DNR/I for procedure 52  BRITNEY/bsa major ax, A4C          6.6   cm/m^2 ---------  Vol/bsa, ES, 1-p A2C (H)    80    ml/m^2   13 - 40  ESD/bsa major ax, A4C          6.8   cm/m^2 ---------  Vol, ES, 2-p                97    ml       ---------  TAMMY, A4C                       49.6  cm^2   ---------  Vol/bsa, ES, 2-p     (H)    70    ml/m^2   16 - 34  JEMAL, A4C                       47.5  cm^2   ---------  AP dim, ES MM        (H)    5.6   cm       2.7 - 3.8  FAC, A4C                       4     %      ---------  AP dim index, ES MM  (H)    4.1   cm/m^2   1.5 - 2.3  PW, ED                         0.8   cm     0.6 - 0.9  IVS/PW, ED                     0.83         ---------  Aortic valve                Value          Ref  EDV                   (H)      187   ml     46 - 106   Leaflet sep, MM             1.0   cm       ---------  ESV                   (H)      162   ml     14 - 42    Peak v, S                   1.6   m/sec    ---------  EF                    (L)      10    %      54 - 74    Mean v, S                   1.01  m/sec    ---------  SV                             17    ml     ---------  Mean grad, S                5     mm Hg    ---------  EDV/bsa               (H)      136   ml/m^2 29 - 61    Peak grad, S                13    mm Hg    ---------  ESV/bsa               (H)      117   ml/m^2 8 - 24     LVOT/AV, VTI ratio          0.25           ---------  SV/bsa                         12    ml/m^2 ---------  CYNTHIA, VTI                    0.5   cm^2     ---------  SV, 1-p A4C                    21    ml     ---------  CYNTHIA/bsa, VTI                0.35  cm^2/m^2 ---------  SV/bsa, 1-p A4C                15    ml/m^2 ---------  EDV, 2-p              (H)      234   ml     46 - 106   Mitral valve                Value          Ref  ESV, 2-p              (H)      209   ml     14 - 42    Mirela diam                    3.6   cm       ---------  SV, 2-p                        25    ml     ---------  Peak E                      0.77  m/sec     ---------  EDV/bsa, 2-p          (H)      170   ml/m^2 29 - 61    Peak A                      0.8   m/sec    ---------  ESV/bsa, 2-p          (H)      151   ml/m^2 8 - 24     Decel time                  95    ms       ---------  SV/bsa, 2-p                    18.1  ml/m^2 ---------  Peak grad, D                2     mm Hg    ---------  IVRT                           85    ms     ---------  Peak E/A ratio              1              ---------  E', lat jin, TDI      (L)      2.2   cm/sec >=10       MR vol, LVOT cont           67    ml       ---------  E/e', lat jin, TDI             35           ---------  MR peak v                   4.37  m/sec    ---------  A', lat jin, TDI               6.3   cm/sec ---------  Peak LV-LA grad S           76    mm Hg    ---------  E'/a', lat jin, TDI            0.35         ---------  Max MR v                    4.37  m/sec    ---------  Regurg VTI                  156.0 cm       ---------  LVOT                           Value        Ref        ERO, PISA                   0.43  cm^2     ---------  Diam, S                        1.8   cm     ---------  Area                           2.5   cm^2   ---------  Tricuspid valve             Value          Ref  TR peak v                   2.8   m/sec    <=2.8  Ventricular septum             Value        Ref        Peak RV-RA grad, S          31    mm Hg    ---------  IVS, ED                        0.7   cm     0.6 - 0.9  Max TR karine                  2.61  m/sec    ---------    Right ventricle                Value        Ref        Aortic root                 Value          Ref  BRITNEY, LAX                       3.0   cm     ---------  Root diam, ED               2.2   cm       <3.7    Legend:  (L)  and  (H)  christoph values outside specified reference range.    Prepared and electronically signed by  Alex Rizzo MD  06/08/2022 15:26    No results found for this or any previous visit.    No results found for this or any previous  visit.      ASSESSMENT AND PLAN:     Impression: This is a 89 year oldfemale w/ pmhx w/ PMHx of AS s/p AVR, A. Fib, ischemic dilated cardiomyopathy s/p ICD placement,  HTN, HLD, anemia, hx of VTE (on apixaban,) CKD (stage III),TIA,vascular dementia, who presented initially after fall on 06/03/22. Cardiology consulted for NSVT, team signed off given plans for discharge. Since this time, developed worsening hypervolemia and acute hypoxic respiratory failure prompting cardiology re-consult.     Assessment:   #AHRF, likely multifactorial including possible pna, worsening CHF  #Acute on chronic decompensated HFrEF  #Acute on chronic metabolic encephalopathy, suspect multifactorial including worsening systemic illness in setting of underlying vascular dementia  #JACOBO on CKD, improving  #Hx ICD placement  #NSVT  #HTN  #Atrial fibrillation  #AS s/p AVR  #Hx TIA  #Hx VTE  #Vascular dementia    Plan:  - Nephrology following and diuresing w/ Lasix 80 mg + Albumin BID  - agree w/ diuresis, will defer to nephrology  - Full dose AC w/ Lovenox at present given NPO  - Ideally would restart oral GDMT management for HFrEF but NPO at present w/out NGT to facilitate. If to remain NPO, could consider NGT placement though per chart review it appears that the POA has declined in the past.   - Recommend continuing to advance goals of care discussion with family members as patient has high likelihood of further decompensation and if family would like to pursue more aggressive medical therapies could consider right heart catheterization, though suspect additional interventions are unlikely to provide long-term meaningful recovery or change the overall trajectory of her current medical condition.  - telemetry monitoring  - goal keep K > 4, Mag > 2  - strict I's/o's, daily weight  - will continue to follow, rest per primary service    Case discussed and staffed with cardiology fellow, Dr. Karen Seymour. Please contact on-call cardiology  fellow if further questions should arise.    BETTINA Benitez, DO  PGY-3 Internal Medicine  6/9/20221:43 PM         -for now planned intervention for Thursday   -Based on aha/acc guidelines, pt high risk for procedure. Currently hemodynamically stable. Pt shortly due for ppm interrogation which should be performed prior to OR. No other intervention/ optimization likely required prior to procedure as long as pt remains hemodynamically stable. Of note, HCP in agreement to revoke DNR/I for procedure    Slim Noble  [phone]  Freddie Castanon- cardiology  [phone]  MATT Garcia - [phone]  Freddie Guysmith -[facility] Proxy- [phone]  Ora Hernandez : HHA for 3 years [phone]

## 2023-07-25 NOTE — PROGRESS NOTE ADULT - PROVIDER SPECIALTY LIST ADULT
Cardiology
Hospitalist
Nephrology
Palliative Care
Patient returned call informed of CT results showing spiculated left lung nodule. Recommendation is to have a PET scan. Patient acknowledged an understanding. Contact information given for [facility].  
Pulmonology
Pulmonology
Nephrology
Pulmonology

## 2023-07-27 NOTE — ED PROVIDER NOTE - NS ED MD DISPO ISOLATION TYPES
Detail Level: Zone Initiate Treatment: mupirocin 2 % topical ointment BID\\nSig: Apply to open skin BID as needed Initiate Treatment: triamcinolone acetonide 0.025 % topical ointment BID\\nSig: Apply to affected areas of rash BID as needed Samples Given: Tide detergent, La Roche Posay body wash and La Roche Posay moisturizer None

## 2024-08-06 NOTE — DISCHARGE NOTE NURSING/CASE MANAGEMENT/SOCIAL WORK - NSDCFUADDAPPT_GEN_ALL_CORE_FT
ambulance  for 12pm 4/5/2019 [facility]  trip # [id number] Ruma Alvarez MD

## 2024-10-01 NOTE — ED PROVIDER NOTE - SKIN, MLM
Unsure of what would cause patient's bedside monitor to beep. Will await transmission.    Skin normal color for race, warm, dry and intact. No evidence of rash.
